# Patient Record
Sex: FEMALE | Race: WHITE | NOT HISPANIC OR LATINO
[De-identification: names, ages, dates, MRNs, and addresses within clinical notes are randomized per-mention and may not be internally consistent; named-entity substitution may affect disease eponyms.]

---

## 2018-03-22 ENCOUNTER — MESSAGE (OUTPATIENT)
Age: 58
End: 2018-03-22

## 2018-05-08 ENCOUNTER — APPOINTMENT (OUTPATIENT)
Dept: MAMMOGRAPHY | Facility: IMAGING CENTER | Age: 58
End: 2018-05-08
Payer: COMMERCIAL

## 2018-05-08 ENCOUNTER — OUTPATIENT (OUTPATIENT)
Dept: OUTPATIENT SERVICES | Facility: HOSPITAL | Age: 58
LOS: 1 days | End: 2018-05-08
Payer: COMMERCIAL

## 2018-05-08 DIAGNOSIS — Z00.8 ENCOUNTER FOR OTHER GENERAL EXAMINATION: ICD-10-CM

## 2018-05-08 PROCEDURE — 77067 SCR MAMMO BI INCL CAD: CPT | Mod: 26

## 2018-05-08 PROCEDURE — 77063 BREAST TOMOSYNTHESIS BI: CPT

## 2018-05-08 PROCEDURE — 77063 BREAST TOMOSYNTHESIS BI: CPT | Mod: 26

## 2018-05-08 PROCEDURE — 77067 SCR MAMMO BI INCL CAD: CPT

## 2019-09-30 ENCOUNTER — OUTPATIENT (OUTPATIENT)
Dept: OUTPATIENT SERVICES | Facility: HOSPITAL | Age: 59
LOS: 1 days | End: 2019-09-30
Payer: COMMERCIAL

## 2019-09-30 ENCOUNTER — APPOINTMENT (OUTPATIENT)
Dept: RADIOLOGY | Facility: IMAGING CENTER | Age: 59
End: 2019-09-30
Payer: COMMERCIAL

## 2019-09-30 ENCOUNTER — APPOINTMENT (OUTPATIENT)
Dept: ULTRASOUND IMAGING | Facility: IMAGING CENTER | Age: 59
End: 2019-09-30
Payer: COMMERCIAL

## 2019-09-30 ENCOUNTER — TRANSCRIPTION ENCOUNTER (OUTPATIENT)
Age: 59
End: 2019-09-30

## 2019-09-30 ENCOUNTER — APPOINTMENT (OUTPATIENT)
Dept: MAMMOGRAPHY | Facility: IMAGING CENTER | Age: 59
End: 2019-09-30
Payer: COMMERCIAL

## 2019-09-30 DIAGNOSIS — Z00.8 ENCOUNTER FOR OTHER GENERAL EXAMINATION: ICD-10-CM

## 2019-09-30 PROCEDURE — 77063 BREAST TOMOSYNTHESIS BI: CPT | Mod: 26

## 2019-09-30 PROCEDURE — 71046 X-RAY EXAM CHEST 2 VIEWS: CPT

## 2019-09-30 PROCEDURE — 76641 ULTRASOUND BREAST COMPLETE: CPT | Mod: 26,50

## 2019-09-30 PROCEDURE — 71046 X-RAY EXAM CHEST 2 VIEWS: CPT | Mod: 26

## 2019-09-30 PROCEDURE — 77067 SCR MAMMO BI INCL CAD: CPT | Mod: 26

## 2019-09-30 PROCEDURE — 77067 SCR MAMMO BI INCL CAD: CPT

## 2019-09-30 PROCEDURE — 77063 BREAST TOMOSYNTHESIS BI: CPT

## 2019-09-30 PROCEDURE — 76641 ULTRASOUND BREAST COMPLETE: CPT

## 2019-10-07 ENCOUNTER — APPOINTMENT (OUTPATIENT)
Dept: ULTRASOUND IMAGING | Facility: IMAGING CENTER | Age: 59
End: 2019-10-07

## 2019-10-07 ENCOUNTER — OUTPATIENT (OUTPATIENT)
Dept: OUTPATIENT SERVICES | Facility: HOSPITAL | Age: 59
LOS: 1 days | End: 2019-10-07
Payer: COMMERCIAL

## 2019-10-07 ENCOUNTER — APPOINTMENT (OUTPATIENT)
Dept: MAMMOGRAPHY | Facility: IMAGING CENTER | Age: 59
End: 2019-10-07

## 2019-10-07 DIAGNOSIS — Z00.8 ENCOUNTER FOR OTHER GENERAL EXAMINATION: ICD-10-CM

## 2019-10-07 PROCEDURE — G0279: CPT

## 2019-10-07 PROCEDURE — 76642 ULTRASOUND BREAST LIMITED: CPT

## 2019-10-07 PROCEDURE — 77065 DX MAMMO INCL CAD UNI: CPT

## 2019-10-10 ENCOUNTER — RESULT REVIEW (OUTPATIENT)
Age: 59
End: 2019-10-10

## 2019-10-10 ENCOUNTER — APPOINTMENT (OUTPATIENT)
Dept: MAMMOGRAPHY | Facility: IMAGING CENTER | Age: 59
End: 2019-10-10
Payer: COMMERCIAL

## 2019-10-10 ENCOUNTER — OUTPATIENT (OUTPATIENT)
Dept: OUTPATIENT SERVICES | Facility: HOSPITAL | Age: 59
LOS: 1 days | End: 2019-10-10
Payer: COMMERCIAL

## 2019-10-10 DIAGNOSIS — Z00.8 ENCOUNTER FOR OTHER GENERAL EXAMINATION: ICD-10-CM

## 2019-10-10 PROCEDURE — 77065 DX MAMMO INCL CAD UNI: CPT

## 2019-10-10 PROCEDURE — A4648: CPT

## 2019-10-10 PROCEDURE — 88305 TISSUE EXAM BY PATHOLOGIST: CPT | Mod: 26

## 2019-10-10 PROCEDURE — 77065 DX MAMMO INCL CAD UNI: CPT | Mod: 26,LT

## 2019-10-10 PROCEDURE — 19081 BX BREAST 1ST LESION STRTCTC: CPT | Mod: LT

## 2019-10-10 PROCEDURE — 19081 BX BREAST 1ST LESION STRTCTC: CPT

## 2019-10-10 PROCEDURE — 88305 TISSUE EXAM BY PATHOLOGIST: CPT

## 2019-10-26 ENCOUNTER — OUTPATIENT (OUTPATIENT)
Dept: OUTPATIENT SERVICES | Facility: HOSPITAL | Age: 59
LOS: 1 days | End: 2019-10-26
Payer: COMMERCIAL

## 2019-10-26 ENCOUNTER — APPOINTMENT (OUTPATIENT)
Dept: CT IMAGING | Facility: CLINIC | Age: 59
End: 2019-10-26
Payer: COMMERCIAL

## 2019-10-26 DIAGNOSIS — Z00.8 ENCOUNTER FOR OTHER GENERAL EXAMINATION: ICD-10-CM

## 2019-10-26 PROCEDURE — 74177 CT ABD & PELVIS W/CONTRAST: CPT | Mod: 26

## 2019-10-26 PROCEDURE — 74177 CT ABD & PELVIS W/CONTRAST: CPT

## 2019-11-01 ENCOUNTER — OUTPATIENT (OUTPATIENT)
Dept: OUTPATIENT SERVICES | Facility: HOSPITAL | Age: 59
LOS: 1 days | End: 2019-11-01
Payer: COMMERCIAL

## 2019-11-01 ENCOUNTER — APPOINTMENT (OUTPATIENT)
Dept: ULTRASOUND IMAGING | Facility: CLINIC | Age: 59
End: 2019-11-01
Payer: COMMERCIAL

## 2019-11-01 DIAGNOSIS — Z00.8 ENCOUNTER FOR OTHER GENERAL EXAMINATION: ICD-10-CM

## 2019-11-01 PROCEDURE — 76830 TRANSVAGINAL US NON-OB: CPT

## 2019-11-01 PROCEDURE — 76830 TRANSVAGINAL US NON-OB: CPT | Mod: 26

## 2019-11-01 PROCEDURE — 76856 US EXAM PELVIC COMPLETE: CPT

## 2019-11-01 PROCEDURE — 76856 US EXAM PELVIC COMPLETE: CPT | Mod: 26

## 2019-11-06 ENCOUNTER — APPOINTMENT (OUTPATIENT)
Dept: COLORECTAL SURGERY | Facility: CLINIC | Age: 59
End: 2019-11-06
Payer: COMMERCIAL

## 2019-11-06 VITALS
HEIGHT: 67 IN | SYSTOLIC BLOOD PRESSURE: 140 MMHG | HEART RATE: 58 BPM | BODY MASS INDEX: 22.76 KG/M2 | DIASTOLIC BLOOD PRESSURE: 80 MMHG | WEIGHT: 145 LBS | RESPIRATION RATE: 12 BRPM

## 2019-11-06 DIAGNOSIS — Z86.39 PERSONAL HISTORY OF OTHER ENDOCRINE, NUTRITIONAL AND METABOLIC DISEASE: ICD-10-CM

## 2019-11-06 DIAGNOSIS — Z81.8 FAMILY HISTORY OF OTHER MENTAL AND BEHAVIORAL DISORDERS: ICD-10-CM

## 2019-11-06 DIAGNOSIS — Z83.71 FAMILY HISTORY OF COLONIC POLYPS: ICD-10-CM

## 2019-11-06 DIAGNOSIS — Z72.89 OTHER PROBLEMS RELATED TO LIFESTYLE: ICD-10-CM

## 2019-11-06 DIAGNOSIS — Z83.79 FAMILY HISTORY OF OTHER DISEASES OF THE DIGESTIVE SYSTEM: ICD-10-CM

## 2019-11-06 DIAGNOSIS — Z83.49 FAMILY HISTORY OF OTHER ENDOCRINE, NUTRITIONAL AND METABOLIC DISEASES: ICD-10-CM

## 2019-11-06 PROCEDURE — 99244 OFF/OP CNSLTJ NEW/EST MOD 40: CPT

## 2019-11-08 ENCOUNTER — OUTPATIENT (OUTPATIENT)
Dept: OUTPATIENT SERVICES | Facility: HOSPITAL | Age: 59
LOS: 1 days | End: 2019-11-08
Payer: COMMERCIAL

## 2019-11-08 VITALS
DIASTOLIC BLOOD PRESSURE: 84 MMHG | HEART RATE: 65 BPM | OXYGEN SATURATION: 100 % | HEIGHT: 67 IN | TEMPERATURE: 98 F | WEIGHT: 138.01 LBS | RESPIRATION RATE: 16 BRPM | SYSTOLIC BLOOD PRESSURE: 132 MMHG

## 2019-11-08 DIAGNOSIS — Z98.890 OTHER SPECIFIED POSTPROCEDURAL STATES: Chronic | ICD-10-CM

## 2019-11-08 DIAGNOSIS — C18.9 MALIGNANT NEOPLASM OF COLON, UNSPECIFIED: ICD-10-CM

## 2019-11-08 DIAGNOSIS — Z01.818 ENCOUNTER FOR OTHER PREPROCEDURAL EXAMINATION: ICD-10-CM

## 2019-11-08 DIAGNOSIS — D49.0 NEOPLASM OF UNSPECIFIED BEHAVIOR OF DIGESTIVE SYSTEM: ICD-10-CM

## 2019-11-08 DIAGNOSIS — Z98.891 HISTORY OF UTERINE SCAR FROM PREVIOUS SURGERY: Chronic | ICD-10-CM

## 2019-11-08 PROCEDURE — 83036 HEMOGLOBIN GLYCOSYLATED A1C: CPT

## 2019-11-08 PROCEDURE — G0463: CPT

## 2019-11-08 PROCEDURE — 86901 BLOOD TYPING SEROLOGIC RH(D): CPT

## 2019-11-08 PROCEDURE — 86850 RBC ANTIBODY SCREEN: CPT

## 2019-11-08 PROCEDURE — 80048 BASIC METABOLIC PNL TOTAL CA: CPT

## 2019-11-08 PROCEDURE — 85027 COMPLETE CBC AUTOMATED: CPT

## 2019-11-08 PROCEDURE — 86900 BLOOD TYPING SEROLOGIC ABO: CPT

## 2019-11-08 RX ORDER — SODIUM CHLORIDE 9 MG/ML
3 INJECTION INTRAMUSCULAR; INTRAVENOUS; SUBCUTANEOUS EVERY 8 HOURS
Refills: 0 | Status: DISCONTINUED | OUTPATIENT
Start: 2019-11-11 | End: 2019-11-11

## 2019-11-08 RX ORDER — CEFOTETAN DISODIUM 1 G
2 VIAL (EA) INJECTION ONCE
Refills: 0 | Status: COMPLETED | OUTPATIENT
Start: 2019-11-11 | End: 2019-11-11

## 2019-11-08 RX ORDER — LIDOCAINE HCL 20 MG/ML
0.2 VIAL (ML) INJECTION ONCE
Refills: 0 | Status: DISCONTINUED | OUTPATIENT
Start: 2019-11-11 | End: 2019-11-11

## 2019-11-08 RX ORDER — CHLORHEXIDINE GLUCONATE 213 G/1000ML
1 SOLUTION TOPICAL ONCE
Refills: 0 | Status: COMPLETED | OUTPATIENT
Start: 2019-11-11 | End: 2019-11-11

## 2019-11-08 NOTE — H&P PST ADULT - NSICDXPASTMEDICALHX_GEN_ALL_CORE_FT
PAST MEDICAL HISTORY:  Anxiety newly dx with cancer    Colon cancer     Graves disease surgery 1981    Hypothyroidism

## 2019-11-08 NOTE — H&P PST ADULT - NSICDXPASTSURGICALHX_GEN_ALL_CORE_FT
PAST SURGICAL HISTORY:  H/O  section     S/P breast biopsy, left 10/2019 benign   marker in place    S/P partial thyroidectomy 1981

## 2019-11-08 NOTE — H&P PST ADULT - NSANTHOSAYNRD_GEN_A_CORE
No. YEHUDA screening performed.  STOP BANG Legend: 0-2 = LOW Risk; 3-4 = INTERMEDIATE Risk; 5-8 = HIGH Risk

## 2019-11-08 NOTE — H&P PST ADULT - LIVES WITH, PROFILE
2nd attempt to reach pt unsuccessful. Left message requesting that pt call back to schedule. Given direct number.  
children

## 2019-11-08 NOTE — H&P PST ADULT - ASSESSMENT
CAPRINI SCORE [CLOT]    AGE RELATED RISK FACTORS                                                       MOBILITY RELATED FACTORS  [x ] Age 41-60 years                                            (1 Point)                  [ ] Bed rest                                                        (1 Point)  [ ] Age: 61-74 years                                           (2 Points)                 [ ] Plaster cast                                                   (2 Points)  [ ] Age= 75 years                                              (3 Points)                 [ ] Bed bound for more than 72 hours                 (2 Points)    DISEASE RELATED RISK FACTORS                                               GENDER SPECIFIC FACTORS  [ ] Edema in the lower extremities                       (1 Point)                  [ ] Pregnancy                                                     (1 Point)  [ ] Varicose veins                                               (1 Point)                  [ ] Post-partum < 6 weeks                                   (1 Point)             [ ] BMI > 25 Kg/m2                                            (1 Point)                  [ ] Hormonal therapy  or oral contraception          (1 Point)                 [ ] Sepsis (in the previous month)                        (1 Point)                  [ ] History of pregnancy complications                 (1 point)  [ ] Pneumonia or serious lung disease                                               [ ] Unexplained or recurrent                     (1 Point)           (in the previous month)                               (1 Point)  [ ] Abnormal pulmonary function test                     (1 Point)                 SURGERY RELATED RISK FACTORS  [ ] Acute myocardial infarction                              (1 Point)                 [ ]  Section                                             (1 Point)  [ ] Congestive heart failure (in the previous month)  (1 Point)               [ ] Minor surgery                                                  (1 Point)   [ ] Inflammatory bowel disease                             (1 Point)                 [ ] Arthroscopic surgery                                        (2 Points)  [ ] Central venous access                                      (2 Points)                [x ] General surgery lasting more than 45 minutes   (2 Points)       [ ] Stroke (in the previous month)                          (5 Points)               [ ] Elective arthroplasty                                         (5 Points)                                                                                                                                               HEMATOLOGY RELATED FACTORS                                                 TRAUMA RELATED RISK FACTORS  [ ] Prior episodes of VTE                                     (3 Points)                 [ ] Fracture of the hip, pelvis, or leg                       (5 Points)  [ ] Positive family history for VTE                         (3 Points)                 [ ] Acute spinal cord injury (in the previous month)  (5 Points)  [ ] Prothrombin 43477 A                                     (3 Points)                 [ ] Paralysis  (less than 1 month)                             (5 Points)  [ ] Factor V Leiden                                             (3 Points)                  [ ] Multiple Trauma within 1 month                        (5 Points)  [ ] Lupus anticoagulants                                     (3 Points)                                                           [ ] Anticardiolipin antibodies                               (3 Points)                                                       [ ] High homocysteine in the blood                      (3 Points)                                             [ ] Other congenital or acquired thrombophilia      (3 Points)                                                [ ] Heparin induced thrombocytopenia                  (3 Points)                                          Total Score [       3   ]  The Caprini score indicates this patient is at risk for a VTE event (score 3-5).  Most surgical patients in this group would benefit from pharmacologic prophylaxis.  The surgical team will determine the balance between VTE risk and bleeding risk      colon cancer

## 2019-11-10 ENCOUNTER — TRANSCRIPTION ENCOUNTER (OUTPATIENT)
Age: 59
End: 2019-11-10

## 2019-11-11 ENCOUNTER — APPOINTMENT (OUTPATIENT)
Dept: COLORECTAL SURGERY | Facility: HOSPITAL | Age: 59
End: 2019-11-11
Payer: COMMERCIAL

## 2019-11-11 ENCOUNTER — RESULT REVIEW (OUTPATIENT)
Age: 59
End: 2019-11-11

## 2019-11-11 ENCOUNTER — INPATIENT (INPATIENT)
Facility: HOSPITAL | Age: 59
LOS: 2 days | Discharge: HOME | DRG: 331 | End: 2019-11-14
Attending: SURGERY | Admitting: SURGERY
Payer: COMMERCIAL

## 2019-11-11 VITALS
HEART RATE: 78 BPM | SYSTOLIC BLOOD PRESSURE: 125 MMHG | TEMPERATURE: 98 F | RESPIRATION RATE: 18 BRPM | DIASTOLIC BLOOD PRESSURE: 91 MMHG | HEIGHT: 67 IN | WEIGHT: 138.01 LBS | OXYGEN SATURATION: 99 %

## 2019-11-11 DIAGNOSIS — Z98.891 HISTORY OF UTERINE SCAR FROM PREVIOUS SURGERY: Chronic | ICD-10-CM

## 2019-11-11 DIAGNOSIS — D49.0 NEOPLASM OF UNSPECIFIED BEHAVIOR OF DIGESTIVE SYSTEM: ICD-10-CM

## 2019-11-11 DIAGNOSIS — Z98.890 OTHER SPECIFIED POSTPROCEDURAL STATES: Chronic | ICD-10-CM

## 2019-11-11 LAB
GLUCOSE BLDC GLUCOMTR-MCNC: 159 MG/DL — HIGH (ref 70–99)
RH IG SCN BLD-IMP: POSITIVE — SIGNIFICANT CHANGE UP

## 2019-11-11 PROCEDURE — 88341 IMHCHEM/IMCYTCHM EA ADD ANTB: CPT | Mod: 26

## 2019-11-11 PROCEDURE — 52005 CYSTO W/URTRL CATHJ: CPT

## 2019-11-11 PROCEDURE — 88309 TISSUE EXAM BY PATHOLOGIST: CPT | Mod: 26

## 2019-11-11 PROCEDURE — 88342 IMHCHEM/IMCYTCHM 1ST ANTB: CPT | Mod: 26

## 2019-11-11 PROCEDURE — 44204 LAPARO PARTIAL COLECTOMY: CPT

## 2019-11-11 RX ORDER — OXYCODONE HYDROCHLORIDE 5 MG/1
5 TABLET ORAL EVERY 4 HOURS
Refills: 0 | Status: DISCONTINUED | OUTPATIENT
Start: 2019-11-11 | End: 2019-11-14

## 2019-11-11 RX ORDER — LEVOTHYROXINE SODIUM 125 MCG
75 TABLET ORAL DAILY
Refills: 0 | Status: DISCONTINUED | OUTPATIENT
Start: 2019-11-11 | End: 2019-11-14

## 2019-11-11 RX ORDER — KETOROLAC TROMETHAMINE 30 MG/ML
15 SYRINGE (ML) INJECTION EVERY 6 HOURS
Refills: 0 | Status: DISCONTINUED | OUTPATIENT
Start: 2019-11-11 | End: 2019-11-12

## 2019-11-11 RX ORDER — ACETAMINOPHEN 500 MG
1000 TABLET ORAL EVERY 6 HOURS
Refills: 0 | Status: COMPLETED | OUTPATIENT
Start: 2019-11-11 | End: 2019-11-12

## 2019-11-11 RX ORDER — SODIUM CHLORIDE 9 MG/ML
1000 INJECTION, SOLUTION INTRAVENOUS
Refills: 0 | Status: DISCONTINUED | OUTPATIENT
Start: 2019-11-11 | End: 2019-11-13

## 2019-11-11 RX ORDER — ENOXAPARIN SODIUM 100 MG/ML
40 INJECTION SUBCUTANEOUS DAILY
Refills: 0 | Status: DISCONTINUED | OUTPATIENT
Start: 2019-11-12 | End: 2019-11-14

## 2019-11-11 RX ORDER — ONDANSETRON 8 MG/1
4 TABLET, FILM COATED ORAL ONCE
Refills: 0 | Status: DISCONTINUED | OUTPATIENT
Start: 2019-11-11 | End: 2019-11-12

## 2019-11-11 RX ORDER — INFLUENZA VIRUS VACCINE 15; 15; 15; 15 UG/.5ML; UG/.5ML; UG/.5ML; UG/.5ML
0.5 SUSPENSION INTRAMUSCULAR ONCE
Refills: 0 | Status: DISCONTINUED | OUTPATIENT
Start: 2019-11-11 | End: 2019-11-14

## 2019-11-11 RX ORDER — HYDROMORPHONE HYDROCHLORIDE 2 MG/ML
0.5 INJECTION INTRAMUSCULAR; INTRAVENOUS; SUBCUTANEOUS
Refills: 0 | Status: DISCONTINUED | OUTPATIENT
Start: 2019-11-11 | End: 2019-11-12

## 2019-11-11 RX ORDER — ONDANSETRON 8 MG/1
4 TABLET, FILM COATED ORAL EVERY 6 HOURS
Refills: 0 | Status: DISCONTINUED | OUTPATIENT
Start: 2019-11-11 | End: 2019-11-14

## 2019-11-11 RX ADMIN — HYDROMORPHONE HYDROCHLORIDE 0.5 MILLIGRAM(S): 2 INJECTION INTRAMUSCULAR; INTRAVENOUS; SUBCUTANEOUS at 19:07

## 2019-11-11 RX ADMIN — HYDROMORPHONE HYDROCHLORIDE 0.5 MILLIGRAM(S): 2 INJECTION INTRAMUSCULAR; INTRAVENOUS; SUBCUTANEOUS at 19:15

## 2019-11-11 RX ADMIN — Medication 15 MILLIGRAM(S): at 23:30

## 2019-11-11 RX ADMIN — SODIUM CHLORIDE 3 MILLILITER(S): 9 INJECTION INTRAMUSCULAR; INTRAVENOUS; SUBCUTANEOUS at 19:06

## 2019-11-11 NOTE — BRIEF OPERATIVE NOTE - NSICDXBRIEFPROCEDURE_GEN_ALL_CORE_FT
PROCEDURES:  Cystoscopy, with bilateral ureteral stent replacement 11-Nov-2019 18:31:08  Luz Maria Latham  Laparoscopic right colectomy 11-Nov-2019 18:29:09  Luz Maria Latham

## 2019-11-11 NOTE — CHART NOTE - NSCHARTNOTEFT_GEN_A_CORE
STATUS POST: Lap Right hemicolectomy     POST OPERATIVE DAY #: 0    SUBJECTIVE: Pt seen at bedside, complains of mild incisional abdominal pain. Denies N/V chest pain, SOB      Vital Signs Last 24 Hrs  T(C): 36.2 (11 Nov 2019 18:43), Max: 36.7 (11 Nov 2019 13:24)  T(F): 97.2 (11 Nov 2019 18:43), Max: 98.1 (11 Nov 2019 13:24)  HR: 62 (11 Nov 2019 22:00) (53 - 79)  BP: 94/51 (11 Nov 2019 21:30) (94/51 - 142/66)  BP(mean): 69 (11 Nov 2019 21:30) (69 - 95)  RR: 12 (11 Nov 2019 22:00) (12 - 18)  SpO2: 100% (11 Nov 2019 22:00) (99% - 100%)  I&O's Summary    11 Nov 2019 07:01  -  11 Nov 2019 22:41  --------------------------------------------------------  IN: 160 mL / OUT: 190 mL / NET: -30 mL      I&O's Detail    11 Nov 2019 07:01  -  11 Nov 2019 22:41  --------------------------------------------------------  IN:    lactated ringers.: 160 mL  Total IN: 160 mL    OUT:    Indwelling Catheter - Urethral: 190 mL  Total OUT: 190 mL    Total NET: -30 mL          MEDICATIONS  (STANDING):  acetaminophen  IVPB .. 1000 milliGRAM(s) IV Intermittent every 6 hours  influenza   Vaccine 0.5 milliLiter(s) IntraMuscular once  ketorolac   Injectable 15 milliGRAM(s) IV Push every 6 hours  lactated ringers. 1000 milliLiter(s) (40 mL/Hr) IV Continuous <Continuous>  levothyroxine 75 MICROGram(s) Oral daily    MEDICATIONS  (PRN):  HYDROmorphone  Injectable 0.5 milliGRAM(s) IV Push every 10 minutes PRN Moderate Pain (4 - 6)  ondansetron Injectable 4 milliGRAM(s) IV Push once PRN Nausea and/or Vomiting  ondansetron Injectable 4 milliGRAM(s) IV Push every 6 hours PRN Nausea  oxyCODONE    IR 5 milliGRAM(s) Oral every 4 hours PRN Moderate Pain (4 - 6)      LABS:                RADIOLOGY & ADDITIONAL STUDIES:    Physical Exam:  General: NAD, resting comfortably  HEENT: NC/AT, EOMI, normal hearing, no oral lesions, no LAD, neck supple  Pulmonary: normal resp effort, CTA-B  Cardiovascular: NSR, no murmurs  Abdominal: soft, non distended, mildly tender to palpation over incision sites, dressings c/d/i   Extremities: WWP, normal strength, no clubbing/cyanosis/edema  Neuro: A/O x 3, CNs II-XII grossly intact, normal sensation, no focal deficits  Pulses: palpable distal pulses    A/P: 58y Female s/p right hemicolectomy   - CLD  - Tylenol/Toradol  - ERP protocol  - OOB  - Monitor Bowel Function

## 2019-11-12 LAB
ANION GAP SERPL CALC-SCNC: 16 MMOL/L — SIGNIFICANT CHANGE UP (ref 5–17)
BASOPHILS # BLD AUTO: 0.02 K/UL — SIGNIFICANT CHANGE UP (ref 0–0.2)
BASOPHILS NFR BLD AUTO: 0.3 % — SIGNIFICANT CHANGE UP (ref 0–2)
BUN SERPL-MCNC: 10 MG/DL — SIGNIFICANT CHANGE UP (ref 7–23)
CALCIUM SERPL-MCNC: 8 MG/DL — LOW (ref 8.4–10.5)
CHLORIDE SERPL-SCNC: 94 MMOL/L — LOW (ref 96–108)
CO2 SERPL-SCNC: 23 MMOL/L — SIGNIFICANT CHANGE UP (ref 22–31)
CREAT SERPL-MCNC: 0.75 MG/DL — SIGNIFICANT CHANGE UP (ref 0.5–1.3)
EOSINOPHIL # BLD AUTO: 0.19 K/UL — SIGNIFICANT CHANGE UP (ref 0–0.5)
EOSINOPHIL NFR BLD AUTO: 2.4 % — SIGNIFICANT CHANGE UP (ref 0–6)
GLUCOSE SERPL-MCNC: 88 MG/DL — SIGNIFICANT CHANGE UP (ref 70–99)
HCT VFR BLD CALC: 34.5 % — SIGNIFICANT CHANGE UP (ref 34.5–45)
HCT VFR BLD CALC: 35.6 % — SIGNIFICANT CHANGE UP (ref 34.5–45)
HGB BLD-MCNC: 11.3 G/DL — LOW (ref 11.5–15.5)
HGB BLD-MCNC: 11.4 G/DL — LOW (ref 11.5–15.5)
IMM GRANULOCYTES NFR BLD AUTO: 0.3 % — SIGNIFICANT CHANGE UP (ref 0–1.5)
LYMPHOCYTES # BLD AUTO: 0.89 K/UL — LOW (ref 1–3.3)
LYMPHOCYTES # BLD AUTO: 11.4 % — LOW (ref 13–44)
MAGNESIUM SERPL-MCNC: 2 MG/DL — SIGNIFICANT CHANGE UP (ref 1.6–2.6)
MCHC RBC-ENTMCNC: 27.3 PG — SIGNIFICANT CHANGE UP (ref 27–34)
MCHC RBC-ENTMCNC: 27.8 PG — SIGNIFICANT CHANGE UP (ref 27–34)
MCHC RBC-ENTMCNC: 32 GM/DL — SIGNIFICANT CHANGE UP (ref 32–36)
MCHC RBC-ENTMCNC: 32.8 GM/DL — SIGNIFICANT CHANGE UP (ref 32–36)
MCV RBC AUTO: 85 FL — SIGNIFICANT CHANGE UP (ref 80–100)
MCV RBC AUTO: 85.4 FL — SIGNIFICANT CHANGE UP (ref 80–100)
MONOCYTES # BLD AUTO: 0.61 K/UL — SIGNIFICANT CHANGE UP (ref 0–0.9)
MONOCYTES NFR BLD AUTO: 7.8 % — SIGNIFICANT CHANGE UP (ref 2–14)
NEUTROPHILS # BLD AUTO: 6.08 K/UL — SIGNIFICANT CHANGE UP (ref 1.8–7.4)
NEUTROPHILS NFR BLD AUTO: 77.8 % — HIGH (ref 43–77)
NRBC # BLD: 0 /100 WBCS — SIGNIFICANT CHANGE UP (ref 0–0)
NRBC # BLD: 0 /100 WBCS — SIGNIFICANT CHANGE UP (ref 0–0)
PHOSPHATE SERPL-MCNC: 3.9 MG/DL — SIGNIFICANT CHANGE UP (ref 2.5–4.5)
PLATELET # BLD AUTO: 191 K/UL — SIGNIFICANT CHANGE UP (ref 150–400)
PLATELET # BLD AUTO: 204 K/UL — SIGNIFICANT CHANGE UP (ref 150–400)
POTASSIUM SERPL-MCNC: 3.9 MMOL/L — SIGNIFICANT CHANGE UP (ref 3.5–5.3)
POTASSIUM SERPL-SCNC: 3.9 MMOL/L — SIGNIFICANT CHANGE UP (ref 3.5–5.3)
RBC # BLD: 4.06 M/UL — SIGNIFICANT CHANGE UP (ref 3.8–5.2)
RBC # BLD: 4.17 M/UL — SIGNIFICANT CHANGE UP (ref 3.8–5.2)
RBC # FLD: 12.6 % — SIGNIFICANT CHANGE UP (ref 10.3–14.5)
RBC # FLD: 12.7 % — SIGNIFICANT CHANGE UP (ref 10.3–14.5)
SODIUM SERPL-SCNC: 133 MMOL/L — LOW (ref 135–145)
WBC # BLD: 6.07 K/UL — SIGNIFICANT CHANGE UP (ref 3.8–10.5)
WBC # BLD: 7.81 K/UL — SIGNIFICANT CHANGE UP (ref 3.8–10.5)
WBC # FLD AUTO: 6.07 K/UL — SIGNIFICANT CHANGE UP (ref 3.8–10.5)
WBC # FLD AUTO: 7.81 K/UL — SIGNIFICANT CHANGE UP (ref 3.8–10.5)

## 2019-11-12 RX ORDER — MAGNESIUM OXIDE 400 MG ORAL TABLET 241.3 MG
1000 TABLET ORAL
Refills: 0 | Status: DISCONTINUED | OUTPATIENT
Start: 2019-11-12 | End: 2019-11-14

## 2019-11-12 RX ORDER — ACETAMINOPHEN 500 MG
1000 TABLET ORAL EVERY 6 HOURS
Refills: 0 | Status: DISCONTINUED | OUTPATIENT
Start: 2019-11-12 | End: 2019-11-14

## 2019-11-12 RX ORDER — ENOXAPARIN SODIUM 100 MG/ML
40 INJECTION SUBCUTANEOUS
Qty: 30 | Refills: 0
Start: 2019-11-12 | End: 2019-12-11

## 2019-11-12 RX ORDER — ENOXAPARIN SODIUM 100 MG/ML
40 INJECTION SUBCUTANEOUS
Qty: 30 | Refills: 0
Start: 2019-11-12 | End: 2019-11-18

## 2019-11-12 RX ORDER — IBUPROFEN 200 MG
400 TABLET ORAL EVERY 6 HOURS
Refills: 0 | Status: DISCONTINUED | OUTPATIENT
Start: 2019-11-12 | End: 2019-11-14

## 2019-11-12 RX ADMIN — OXYCODONE HYDROCHLORIDE 5 MILLIGRAM(S): 5 TABLET ORAL at 19:08

## 2019-11-12 RX ADMIN — Medication 15 MILLIGRAM(S): at 07:46

## 2019-11-12 RX ADMIN — Medication 1000 MILLIGRAM(S): at 13:15

## 2019-11-12 RX ADMIN — ENOXAPARIN SODIUM 40 MILLIGRAM(S): 100 INJECTION SUBCUTANEOUS at 12:45

## 2019-11-12 RX ADMIN — SODIUM CHLORIDE 40 MILLILITER(S): 9 INJECTION, SOLUTION INTRAVENOUS at 00:21

## 2019-11-12 RX ADMIN — Medication 75 MICROGRAM(S): at 06:14

## 2019-11-12 RX ADMIN — MAGNESIUM OXIDE 400 MG ORAL TABLET 1000 MILLIGRAM(S): 241.3 TABLET ORAL at 19:11

## 2019-11-12 RX ADMIN — Medication 15 MILLIGRAM(S): at 12:45

## 2019-11-12 RX ADMIN — Medication 400 MILLIGRAM(S): at 06:13

## 2019-11-12 RX ADMIN — Medication 15 MILLIGRAM(S): at 06:14

## 2019-11-12 RX ADMIN — Medication 1000 MILLIGRAM(S): at 00:20

## 2019-11-12 RX ADMIN — Medication 15 MILLIGRAM(S): at 13:00

## 2019-11-12 RX ADMIN — Medication 1000 MILLIGRAM(S): at 23:28

## 2019-11-12 RX ADMIN — Medication 1000 MILLIGRAM(S): at 07:46

## 2019-11-12 RX ADMIN — Medication 400 MILLIGRAM(S): at 12:45

## 2019-11-12 RX ADMIN — Medication 400 MILLIGRAM(S): at 20:29

## 2019-11-12 RX ADMIN — Medication 400 MILLIGRAM(S): at 17:49

## 2019-11-12 RX ADMIN — Medication 15 MILLIGRAM(S): at 00:03

## 2019-11-12 RX ADMIN — Medication 400 MILLIGRAM(S): at 00:02

## 2019-11-12 RX ADMIN — OXYCODONE HYDROCHLORIDE 5 MILLIGRAM(S): 5 TABLET ORAL at 23:28

## 2019-11-12 RX ADMIN — Medication 400 MILLIGRAM(S): at 21:00

## 2019-11-12 NOTE — PHYSICAL EXAM
[Normal Breath Sounds] : Normal breath sounds [Normal Heart Sounds] : normal heart sounds [Normal Rate and Rhythm] : normal rate and rhythm [No Edema] : No edema [Alert] : alert [Oriented to Person] : oriented to person [Oriented to Place] : oriented to place [Oriented to Time] : oriented to time [Anxious] : anxious [de-identified] : RLQ fullness/tenderness.  +BS Pfann scar [de-identified] : +ROM [de-identified] : NC/AT [de-identified] : intact

## 2019-11-12 NOTE — PROGRESS NOTE ADULT - SUBJECTIVE AND OBJECTIVE BOX
COLORECTAL SURGERY DAILY PROGRESS NOTE:       SUBJECTIVE/ROS: Patient complaining of incisional pain- no flatus or bowel movement- Denies nausea, vomiting, chest pain, shortness of breath       MEDICATIONS  (STANDING):  acetaminophen   Tablet .. 1000 milliGRAM(s) Oral every 6 hours  acetaminophen  IVPB .. 1000 milliGRAM(s) IV Intermittent every 6 hours  enoxaparin Injectable 40 milliGRAM(s) SubCutaneous daily  ibuprofen  Tablet. 400 milliGRAM(s) Oral every 6 hours  influenza   Vaccine 0.5 milliLiter(s) IntraMuscular once  ketorolac   Injectable 15 milliGRAM(s) IV Push every 6 hours  lactated ringers. 1000 milliLiter(s) (40 mL/Hr) IV Continuous <Continuous>  levothyroxine 75 MICROGram(s) Oral daily  magnesium oxide 1000 milliGRAM(s) Oral two times a day with meals    MEDICATIONS  (PRN):  ondansetron Injectable 4 milliGRAM(s) IV Push every 6 hours PRN Nausea  oxyCODONE    IR 5 milliGRAM(s) Oral every 4 hours PRN Moderate Pain (4 - 6)      OBJECTIVE:    Vital Signs Last 24 Hrs  T(C): 36.5 (12 Nov 2019 06:00), Max: 36.7 (11 Nov 2019 13:24)  T(F): 97.7 (12 Nov 2019 06:00), Max: 98.1 (11 Nov 2019 13:24)  HR: 60 (12 Nov 2019 06:00) (53 - 79)  BP: 101/58 (12 Nov 2019 06:00) (86/50 - 142/66)  BP(mean): 75 (12 Nov 2019 06:00) (64 - 95)  RR: 16 (12 Nov 2019 06:00) (12 - 18)  SpO2: 100% (12 Nov 2019 06:00) (99% - 100%)        I&O's Detail    11 Nov 2019 07:01  -  12 Nov 2019 07:00  --------------------------------------------------------  IN:    lactated ringers.: 480 mL    Oral Fluid: 100 mL  Total IN: 580 mL    OUT:    Indwelling Catheter - Urethral: 525 mL  Total OUT: 525 mL    Total NET: 55 mL          Daily Height in cm: 170.18 (11 Nov 2019 16:32)    Daily     LABS:                        11.3   7.81  )-----------( 204      ( 12 Nov 2019 03:10 )             34.5     11-12    133<L>  |  94<L>  |  10  ----------------------------<  88  3.9   |  23  |  0.75    Ca    8.0<L>      12 Nov 2019 03:10  Phos  3.9     11-12  Mg     2.0     11-12          Physical Exam:  General: NAD, resting comfortably  HEENT: NC/AT, EOMI, normal hearing, no oral lesions, no LAD, neck supple  Pulmonary: normal resp effort, CTA-B  Cardiovascular: NSR, no murmurs  Abdominal: soft, non distended, mildly tender to palpation over incision sites, incision c/d/i with mychal in place  Extremities: WWP, normal strength, no clubbing/cyanosis/edema  Neuro: A/O x 3, CNs II-XII grossly intact, normal sensation, no focal deficits  Pulses: palpable distal pulses

## 2019-11-12 NOTE — PROGRESS NOTE ADULT - ASSESSMENT
A/P: 58y Female s/p right hemicolectomy for cecal mass ERAS 11/11     - CLD-->LRD  - D/C IVF when tolerating 600cc of clears  - Tylenol/Toradol for pain control  - will require ppx Lovenox for discharge- subcutaneous medication education   - OOB/Ambulate  - ucath removed - d/c [facility]  - Monitor Bowel Function    Arelis Schulz PA-C [id number]

## 2019-11-12 NOTE — REVIEW OF SYSTEMS
[As Noted in HPI] : as noted in HPI [Anxiety] : anxiety [Negative] : Endocrine [Shortness Of Breath] : no shortness of breath [Chest Pain] : no chest pain [Easy Bruising] : no tendency for easy bruising [Easy Bleeding] : no tendency for easy bleeding

## 2019-11-12 NOTE — ASSESSMENT
[FreeTextEntry1] : The patient is a very pleasant 58-year-old female presents for consultation regarding management of a biopsy-proven adenocarcinoma of the cecum.\par \par Patient has had long-standing right lower quadrant abdominal pain. She had a colonoscopy in  which was reported as negative. Due to persistence of this discomfort her internist wanted her to have a repeat evaluation by GI.  A CAT scan of the abdomen and pelvis was performed revealing a mass of the right colon. This was followed by a colonoscopy revealing a sizable, biopsy-proven adenocarcinoma of the cecum/ascending colon. CAT scan revealed no distant metastatic disease but possible involvement of the right adnexa. Some large lymph nodes were also noted.\par \par The patient's past medical history is significant for elevated cholesterol and a past history of hyperthyroidism (Graves' disease). Past surgical history is significant for partial thyroidectomy and a  section via a Pfannenstiel incision.\par \par I had a long discussion with the patient and her friend who accompanied her regarding treatment of right-sided colon cancer. The first step of intervention is that of a right colectomy. Whether the right tube and ovary needs to be removed will be determined by the operative findings. We discussed laparoscopic and open approaches. We talked about anticipated operative time, hospital stay and time to complete recuperation. Risks, alternatives and benefits including but not limited to an anastomotic leak, wound infection and deep venous thrombosis were discussed.\par \par All questions were answered and she wishes to proceed with surgical scheduling as soon as possible. As an aside, the possibility of adjuvant therapy was also discussed and she was told that this is stage dependent.  We did in fact discuss staging and the fact that the result would be back approximately 7-10 days after surgery.

## 2019-11-12 NOTE — HISTORY OF PRESENT ILLNESS
[FreeTextEntry1] : 57yo WF with longstanding hx of RLQ pain (2012 colonoscopy performed.  Reportedly nl study).  2017 routine GYN pelvic sono (results nl).  Pts RLQ pain has persisted xyrs.  Pt cannot sleep on right side. Occasional bloating.  Denies change in bowel habits.  \par \par Initial appt with GI @2 weeks ago.  GI palpated RLQ fullness.  CT w/contrast was performed revealing cecal mass w/enlarged LNs.  F/U colonoscopy revealed sessile SC polyp which was biopsied (hyperplastic) and a malignant appearing mass involving IC valve/cecum.  Path consistent with adenoCA.  Presents for surgical consultation.\par \par Oct 2019 Underwent left breast stereotactic bx.  Results wnl

## 2019-11-13 ENCOUNTER — TRANSCRIPTION ENCOUNTER (OUTPATIENT)
Age: 59
End: 2019-11-13

## 2019-11-13 DIAGNOSIS — C18.9 MALIGNANT NEOPLASM OF COLON, UNSPECIFIED: ICD-10-CM

## 2019-11-13 LAB
ANION GAP SERPL CALC-SCNC: 18 MMOL/L — HIGH (ref 5–17)
BUN SERPL-MCNC: 6 MG/DL — LOW (ref 7–23)
CALCIUM SERPL-MCNC: 8.4 MG/DL — SIGNIFICANT CHANGE UP (ref 8.4–10.5)
CHLORIDE SERPL-SCNC: 95 MMOL/L — LOW (ref 96–108)
CO2 SERPL-SCNC: 21 MMOL/L — LOW (ref 22–31)
CREAT SERPL-MCNC: 0.74 MG/DL — SIGNIFICANT CHANGE UP (ref 0.5–1.3)
GLUCOSE BLDC GLUCOMTR-MCNC: 167 MG/DL — HIGH (ref 70–99)
GLUCOSE BLDC GLUCOMTR-MCNC: 68 MG/DL — LOW (ref 70–99)
GLUCOSE SERPL-MCNC: 52 MG/DL — LOW (ref 70–99)
HCT VFR BLD CALC: 36.6 % — SIGNIFICANT CHANGE UP (ref 34.5–45)
HGB BLD-MCNC: 11.7 G/DL — SIGNIFICANT CHANGE UP (ref 11.5–15.5)
MAGNESIUM SERPL-MCNC: 2 MG/DL — SIGNIFICANT CHANGE UP (ref 1.6–2.6)
MCHC RBC-ENTMCNC: 28.1 PG — SIGNIFICANT CHANGE UP (ref 27–34)
MCHC RBC-ENTMCNC: 32 GM/DL — SIGNIFICANT CHANGE UP (ref 32–36)
MCV RBC AUTO: 88 FL — SIGNIFICANT CHANGE UP (ref 80–100)
PHOSPHATE SERPL-MCNC: 2.3 MG/DL — LOW (ref 2.5–4.5)
PLATELET # BLD AUTO: 201 K/UL — SIGNIFICANT CHANGE UP (ref 150–400)
POTASSIUM SERPL-MCNC: 3.5 MMOL/L — SIGNIFICANT CHANGE UP (ref 3.5–5.3)
POTASSIUM SERPL-SCNC: 3.5 MMOL/L — SIGNIFICANT CHANGE UP (ref 3.5–5.3)
RBC # BLD: 4.16 M/UL — SIGNIFICANT CHANGE UP (ref 3.8–5.2)
RBC # FLD: 12.7 % — SIGNIFICANT CHANGE UP (ref 10.3–14.5)
SODIUM SERPL-SCNC: 134 MMOL/L — LOW (ref 135–145)
WBC # BLD: 5.89 K/UL — SIGNIFICANT CHANGE UP (ref 3.8–10.5)
WBC # FLD AUTO: 5.89 K/UL — SIGNIFICANT CHANGE UP (ref 3.8–10.5)

## 2019-11-13 RX ORDER — DEXTROSE 50 % IN WATER 50 %
50 SYRINGE (ML) INTRAVENOUS ONCE
Refills: 0 | Status: COMPLETED | OUTPATIENT
Start: 2019-11-13 | End: 2019-11-13

## 2019-11-13 RX ORDER — APIXABAN 2.5 MG/1
1 TABLET, FILM COATED ORAL
Qty: 60 | Refills: 0
Start: 2019-11-13 | End: 2019-12-12

## 2019-11-13 RX ORDER — SODIUM,POTASSIUM PHOSPHATES 278-250MG
1 POWDER IN PACKET (EA) ORAL ONCE
Refills: 0 | Status: COMPLETED | OUTPATIENT
Start: 2019-11-13 | End: 2019-11-13

## 2019-11-13 RX ORDER — SODIUM,POTASSIUM PHOSPHATES 278-250MG
1 POWDER IN PACKET (EA) ORAL
Refills: 0 | Status: COMPLETED | OUTPATIENT
Start: 2019-11-13 | End: 2019-11-14

## 2019-11-13 RX ORDER — POTASSIUM CHLORIDE 20 MEQ
40 PACKET (EA) ORAL ONCE
Refills: 0 | Status: COMPLETED | OUTPATIENT
Start: 2019-11-13 | End: 2019-11-13

## 2019-11-13 RX ADMIN — Medication 1 PACKET(S): at 17:28

## 2019-11-13 RX ADMIN — Medication 400 MILLIGRAM(S): at 02:27

## 2019-11-13 RX ADMIN — MAGNESIUM OXIDE 400 MG ORAL TABLET 1000 MILLIGRAM(S): 241.3 TABLET ORAL at 17:29

## 2019-11-13 RX ADMIN — Medication 400 MILLIGRAM(S): at 12:14

## 2019-11-13 RX ADMIN — MAGNESIUM OXIDE 400 MG ORAL TABLET 1000 MILLIGRAM(S): 241.3 TABLET ORAL at 12:19

## 2019-11-13 RX ADMIN — Medication 1000 MILLIGRAM(S): at 05:39

## 2019-11-13 RX ADMIN — Medication 400 MILLIGRAM(S): at 03:00

## 2019-11-13 RX ADMIN — Medication 50 MILLILITER(S): at 12:17

## 2019-11-13 RX ADMIN — OXYCODONE HYDROCHLORIDE 5 MILLIGRAM(S): 5 TABLET ORAL at 23:26

## 2019-11-13 RX ADMIN — OXYCODONE HYDROCHLORIDE 5 MILLIGRAM(S): 5 TABLET ORAL at 06:14

## 2019-11-13 RX ADMIN — Medication 40 MILLIEQUIVALENT(S): at 14:26

## 2019-11-13 RX ADMIN — Medication 1000 MILLIGRAM(S): at 20:47

## 2019-11-13 RX ADMIN — Medication 1000 MILLIGRAM(S): at 00:00

## 2019-11-13 RX ADMIN — Medication 1000 MILLIGRAM(S): at 06:14

## 2019-11-13 RX ADMIN — Medication 1000 MILLIGRAM(S): at 14:12

## 2019-11-13 RX ADMIN — Medication 400 MILLIGRAM(S): at 17:28

## 2019-11-13 RX ADMIN — Medication 1000 MILLIGRAM(S): at 20:17

## 2019-11-13 RX ADMIN — OXYCODONE HYDROCHLORIDE 5 MILLIGRAM(S): 5 TABLET ORAL at 00:00

## 2019-11-13 RX ADMIN — OXYCODONE HYDROCHLORIDE 5 MILLIGRAM(S): 5 TABLET ORAL at 23:56

## 2019-11-13 RX ADMIN — ENOXAPARIN SODIUM 40 MILLIGRAM(S): 100 INJECTION SUBCUTANEOUS at 12:21

## 2019-11-13 RX ADMIN — OXYCODONE HYDROCHLORIDE 5 MILLIGRAM(S): 5 TABLET ORAL at 05:39

## 2019-11-13 RX ADMIN — Medication 75 MICROGRAM(S): at 05:38

## 2019-11-13 NOTE — PHYSICAL THERAPY INITIAL EVALUATION ADULT - PERTINENT HX OF CURRENT PROBLEM, REHAB EVAL
59 y/o F, s/p right hemicolectomy for cecal mass ERAS 11/11. PMH: anxiety newly dx with cancer, colon cancer, graves disease surgery 1981, hypothyroidism.

## 2019-11-13 NOTE — PROGRESS NOTE ADULT - ASSESSMENT
A/P: 58y Female s/p right hemicolectomy for cecal mass ERAS 11/11. She is doing well postoperatively and met her DTV after Gann removal. She is tolerating her diet and pain is well controlled. She is hemodynamically stable and afebrile.     - LRD  - Tylenol/Motrin for pain control  - will require ppx Lovenox for discharge- subcutaneous medication education   - OOB/Ambulate  - Monitor Bowel Function  - Dispo: d/c once having bowel function    [id number]

## 2019-11-13 NOTE — CHART NOTE - NSCHARTNOTEFT_GEN_A_CORE
Eliquis 2.5mg BID for 30 days sent to [facility] with no cost to patient with voucher- spoke with Alejandrina in [facility]    Arelis Schulz PA-C [id number]

## 2019-11-13 NOTE — DISCHARGE NOTE PROVIDER - NSDCFUADDINST_GEN_ALL_CORE_FT
Please utilize tylenol and motrin before using oxycodone for pain relief.  Do not exceed more then 4gm of tylenol in 24 hour period.

## 2019-11-13 NOTE — PROGRESS NOTE ADULT - SUBJECTIVE AND OBJECTIVE BOX
Red Team Surgery Progress Note     SUBJECTIVE / 24H EVENTS  Patient seen and examined on morning rounds. No acute events overnight. She had her Gann removed yesterday and met her DTV. She is tolerating her diet without issue and pain is well controlled. She denies fever, chills, n/v.    OBJECTIVE:    VITAL SIGNS:  T(C): 36.7 (11-13-19 @ 04:11), Max: 37.2 (11-12-19 @ 21:35)  HR: 72 (11-13-19 @ 04:11) (59 - 75)  BP: 119/73 (11-13-19 @ 04:11) (108/66 - 123/78)  RR: 18 (11-13-19 @ 04:11) (14 - 18)  SpO2: 98% (11-13-19 @ 04:11) (97% - 100%)  Daily     Daily       PHYSICAL EXAM:  Gen: NAD  LS: Respirations unlabored  Card: RRR  GI: Soft. Nontender. Nondistended. Incision c/d/i  : Gann in place  Ext: Warm, well perfused      11-12-19 @ 07:01  -  11-13-19 @ 07:00  --------------------------------------------------------  IN:    IV PiggyBack: 100 mL    lactated ringers.: 880 mL    Oral Fluid: 560 mL  Total IN: 1540 mL    OUT:    Indwelling Catheter - Urethral: 110 mL    Voided: 900 mL  Total OUT: 1010 mL    Total NET: 530 mL          LAB VALUES:  11-13    134<L>  |  95<L>  |  6<L>  ----------------------------<  52<L>  3.5   |  21<L>  |  0.74    Ca    8.4      13 Nov 2019 06:58  Phos  2.3     11-13  Mg     2.0     11-13                                 11.4   6.07  )-----------( 191      ( 12 Nov 2019 12:59 )             35.6                   MICROBIOLOGY:    No new microbiology data for review.     RADIOLOGY:    No new radiographic images for review.    MEDICATIONS  (STANDING):  acetaminophen   Tablet .. 1000 milliGRAM(s) Oral every 6 hours  enoxaparin Injectable 40 milliGRAM(s) SubCutaneous daily  ibuprofen  Tablet. 400 milliGRAM(s) Oral every 6 hours  influenza   Vaccine 0.5 milliLiter(s) IntraMuscular once  levothyroxine 75 MICROGram(s) Oral daily  magnesium oxide 1000 milliGRAM(s) Oral two times a day with meals    MEDICATIONS  (PRN):  ondansetron Injectable 4 milliGRAM(s) IV Push every 6 hours PRN Nausea  oxyCODONE    IR 5 milliGRAM(s) Oral every 4 hours PRN Moderate Pain (4 - 6)

## 2019-11-13 NOTE — DIETITIAN INITIAL EVALUATION ADULT. - ENERGY INTAKE
Pt reports tolerating clear liquid diet well today, pt drank full promote, ensure clear, diet advanced to low fiber this morning-menu provided and reviewed ordering procedure on 2 alexander

## 2019-11-13 NOTE — DIETITIAN INITIAL EVALUATION ADULT. - ENERGY NEEDS
Ht: 5'7", Wt: 137.7 lbs, BMI: 21.6 kg/m2, IBW: 135 lbs +/- 10%, %IBW: 102%  No edema, Skin free of pressure injury per nursing flowsheets

## 2019-11-13 NOTE — DIETITIAN INITIAL EVALUATION ADULT. - REASON INDICATOR FOR ASSESSMENT
Pt seen for nutrition consult for [facility] low fiber diet education. Information obtained from patient.     Pt is a 58 year old female with newly diagnosed colon cancer now S/P R hemicolectomy 11/11, following ERAS protocol.

## 2019-11-13 NOTE — PHYSICAL THERAPY INITIAL EVALUATION ADULT - ADDITIONAL COMMENTS
Prior to admission pt reports being independent of all ADL's & functional mobility without AD. Pt resides in house, 5 steps to enter, 1 flight to bedroom, (+) HR. Pt was a fulltime  PTA. (+) Driving.

## 2019-11-13 NOTE — DISCHARGE NOTE PROVIDER - NSDCCPTREATMENT_GEN_ALL_CORE_FT
PRINCIPAL PROCEDURE  Procedure: Laparoscopic right colectomy  Findings and Treatment: recover from surgery

## 2019-11-13 NOTE — DIETITIAN INITIAL EVALUATION ADULT. - OTHER INFO
Diet PTA: Pt reports decreased appetite for ~1 week leading up to surgery due to anxiety, stated she would still make an effort to eat but noticed her intake was slightly decreased from baseline. Pt typically consumes 3 meals per day plus snacks. Pt does not follow any formal dietary restrictions at home.     Weight: Pt reports UBW of 138 lbs which is consistent with current dosing weight 137.7 lbs (11/11).    Pt with no food allergies, no micronutrient supplementation PTA. No N+V, no BM yet but pt feels as though she needs to pass gas. No difficulty chewing/swallowing.    Diet education: RD provided low fiber diet education specifically choosing refined grain products, avoidance of fresh fruits, vegetables, nuts, beans and seeds, RD provided examples of foods to choose and foods to avoid-pt receptive to education and accepted written materials. RD also reviewed importance of adequate protein consumption following surgery to further aid on wound healing and recovery. Consuming smaller, more frequent meals while appetite recovers.

## 2019-11-13 NOTE — DISCHARGE NOTE PROVIDER - NSDCCPCAREPLAN_GEN_ALL_CORE_FT
PRINCIPAL DISCHARGE DIAGNOSIS  Diagnosis: Colon cancer  Assessment and Plan of Treatment: s/p right colectomy  WOUND CARE: Staples will be removed at follow up office visit.    BATHING: Please do not submerge wound underwater. You may shower and/or sponge bathe.  ACTIVITY: No heavy lifting anything more than 10-15lbs or straining. Otherwise, you may return to your usual level of physical activity. If you are taking narcotic pain medication (such as oxycodone or Percocet), do NOT drive a car, operate machinery or make important decisions.  NOTIFY YOUR SURGEON IF: You have any bleeding that does not stop, any pus draining from your wound, any fever (over 100.4 F) or chills, persistent nausea/vomiting with inability to tolerate food or liquids, persistent diarrhea, or if your pain is not controlled on your discharge pain medications.  FOLLOW-UP:  1. Please call to make a follow-up appointment in 1-2 weeks upon discharge from the hospital with Dr. Haynes  2. Please follow up with your primary care physician in one week regarding your hospitalization.

## 2019-11-13 NOTE — DISCHARGE NOTE PROVIDER - NSDCMRMEDTOKEN_GEN_ALL_CORE_FT
apixaban 2.5 mg oral tablet: 1 tab(s) orally 2 times a day   levothyroxine 75 mcg (0.075 mg) oral capsule: 1 cap(s) orally once a day acetaminophen 500 mg oral tablet: 2 tab(s) orally every 6 hours  apixaban 2.5 mg oral tablet: 1 tab(s) orally 2 times a day   ibuprofen 400 mg oral tablet: 1 tab(s) orally every 6 hours  levothyroxine 75 mcg (0.075 mg) oral capsule: 1 cap(s) orally once a day  oxyCODONE 5 mg oral tablet: 1 tab(s) orally every 4 hours, As needed, Moderate Pain (4 - 6) MDD:3

## 2019-11-13 NOTE — CONSULT NOTE ADULT - SUBJECTIVE AND OBJECTIVE BOX
Patient is a 58y Female     Patient is a 58y old  Female who presents with a chief complaint of Diverticulitis (2019 08:30)      HPI:  58 year old female with right side pain and discomfort had been referred for CT mass noted right colon had colonoscopy with biopsy + for colon cancer referred for surgery. (2019 18:02)      PAST MEDICAL & SURGICAL HISTORY:  Anxiety: newly dx with cancer  Graves disease: surgery   Hypothyroidism  Colon cancer  S/P breast biopsy, left: 10/2019 benign   marker in place  H/O  section:   S/P partial thyroidectomy:       MEDICATIONS  (STANDING):  acetaminophen   Tablet .. 1000 milliGRAM(s) Oral every 6 hours  enoxaparin Injectable 40 milliGRAM(s) SubCutaneous daily  ibuprofen  Tablet. 400 milliGRAM(s) Oral every 6 hours  influenza   Vaccine 0.5 milliLiter(s) IntraMuscular once  levothyroxine 75 MICROGram(s) Oral daily  magnesium oxide 1000 milliGRAM(s) Oral two times a day with meals  potassium chloride   Powder 40 milliEquivalent(s) Oral once  potassium phosphate / sodium phosphate powder 1 Packet(s) Oral once      Allergies    No Known Allergies    Intolerances        SOCIAL HISTORY:  Denies ETOh,Smoking,     FAMILY HISTORY:  FH: diverticulitis: maternal aunt      REVIEW OF SYSTEMS:    CONSTITUTIONAL: No weakness, fevers or chills  EYES/ENT: No visual changes;  No vertigo or throat pain   NECK: No pain or stiffness  RESPIRATORY: No cough, wheezing, hemoptysis; No shortness of breath  CARDIOVASCULAR: No chest pain or palpitations  GASTROINTESTINAL: No abdominal or epigastric pain. No nausea, vomiting, or hematemesis; No diarrhea or constipation. No melena or hematochezia.  GENITOURINARY: No dysuria, frequency or hematuria  NEUROLOGICAL: No numbness or weakness  SKIN: No itching, burning, rashes, or lesions   All other review of systems is negative unless indicated above.    VITAL:  T(C): , Max: 37.2 (19 @ 21:35)  T(F): , Max: 99 (19 @ 21:35)  HR: 72 (19 @ 04:11)  BP: 119/73 (19 @ 04:11)  BP(mean): 85 (19 @ 14:00)  RR: 18 (19 @ 04:11)  SpO2: 98% (19 @ 04:11)  Wt(kg): --    I and O's:     @ 07:01  -   @ 07:00  --------------------------------------------------------  IN: 620 mL / OUT: 565 mL / NET: 55 mL     @ 07:01  -   @ 07:00  --------------------------------------------------------  IN: 1540 mL / OUT: 1010 mL / NET: 530 mL          PHYSICAL EXAM:    Constitutional: NAD  HEENT: PERRLA,   Neck: No JVD  Respiratory: CTA B/L  Cardiovascular: S1 and S2  Gastrointestinal: hypoactive BS+, soft, dressing in place, expected tenderness  Extremities: No peripheral edema  Neurological: A/O x 3, no focal deficits  Psychiatric: Normal mood, normal affect  : No Gann  Skin: No rashes  Access: Not applicable  Back: No CVA tenderness    LABS:                        11.4   6.07  )-----------( 191      ( 2019 12:59 )             35.6     13    134<L>  |  95<L>  |  6<L>  ----------------------------<  52<L>  3.5   |  21<L>  |  0.74    Ca    8.4      2019 06:58  Phos  2.3       Mg     2.0                 RADIOLOGY & ADDITIONAL STUDIES:

## 2019-11-13 NOTE — DISCHARGE NOTE PROVIDER - HOSPITAL COURSE
58 y.o. female with cecal mass presented on 11/11/19 and underwent a laparoscopic right colectomy with ERAS protocol.  The patient tolerated the procedure well (see operative report for full details). She received intrathecal morphine for pain control. She was placed on IV tylenol and toradol for 24 hours.  Mrs. Rutherford was given clear liquids with ensure clears in PACU and encouraged with early ambulation.  Gann was removed on POD #1. She began voiding well on her own.  Once IV pain control dosing complete, she was transitioned to oral tylenol and motrin with oxycodone for breakthrough pain.  Once she tolerated 600cc of clear liquids, her diet was advanced to low fiber. 58 y.o. female with cecal mass presented on 11/11/19 and underwent a laparoscopic right colectomy with ERAS protocol.  The patient tolerated the procedure well (see operative report for full details). She received intrathecal morphine for pain control. She was placed on IV tylenol and toradol for 24 hours.  Mrs. Rutherford was given clear liquids with ensure clears in PACU and encouraged with early ambulation.  Gann was removed on POD #1. She began voiding well on her own.  Once IV pain control dosing complete, she was transitioned to oral tylenol and motrin with oxycodone for breakthrough pain.  Once she tolerated 600cc of clear liquids, her diet was advanced to low fiber.  Her caprini score was only 5 but given her cancer diagnosis, she was prescribed Eliquis DVT prophylaxis for 30 days.  On day of discharge, the patient was tolerating diet, ambulating well and pain controlled. She will follow up with Dr. Haynes in 1 week. 58 y.o. female with cecal mass presented on 11/11/19 and underwent a laparoscopic right colectomy, cystoscopy, ureteral catheters with ERAS protocol.  The patient tolerated the procedure well (see operative report for full details). She received intrathecal morphine for pain control. One ureteral catheter was removed immediately after OR. She was placed on IV tylenol and toradol for 24 hours.  Mrs. Rutherford was given clear liquids with ensure clears in PACU and encouraged with early ambulation.  Gann and remaining u-cath was removed on POD #1. She began voiding well on her own.  Once IV pain control dosing complete, she was transitioned to oral tylenol and motrin with oxycodone for breakthrough pain.  Once she tolerated 600cc of clear liquids, her diet was advanced to low fiber.  Her caprini score was only 5 but given her cancer diagnosis, she was prescribed Eliquis DVT prophylaxis for 30 days.  On day of discharge, the patient was tolerating diet, ambulating well and pain controlled. She will follow up with Dr. Haynes in 1 week.

## 2019-11-13 NOTE — DISCHARGE NOTE PROVIDER - CARE PROVIDER_API CALL
Yuval Haynes)  ColonRectal Surgery; Surgery  [address], [address]  [place] [address]  Phone: [phone]  Fax: [phone]  Follow Up Time:

## 2019-11-13 NOTE — DIETITIAN INITIAL EVALUATION ADULT. - ADD RECOMMEND
1. Review diet education as needed, 2. Continue to encourage po intake and obtain/honor food preferences as able, 3. Monitor PO intake, diet tolerance, weight trends, labs, and skin integrity

## 2019-11-14 ENCOUNTER — TRANSCRIPTION ENCOUNTER (OUTPATIENT)
Age: 59
End: 2019-11-14

## 2019-11-14 VITALS
OXYGEN SATURATION: 98 % | HEART RATE: 74 BPM | DIASTOLIC BLOOD PRESSURE: 77 MMHG | SYSTOLIC BLOOD PRESSURE: 116 MMHG | TEMPERATURE: 98 F | RESPIRATION RATE: 18 BRPM

## 2019-11-14 PROBLEM — C18.9 MALIGNANT NEOPLASM OF COLON, UNSPECIFIED: Chronic | Status: ACTIVE | Noted: 2019-11-08

## 2019-11-14 PROBLEM — E05.00 THYROTOXICOSIS WITH DIFFUSE GOITER WITHOUT THYROTOXIC CRISIS OR STORM: Chronic | Status: ACTIVE | Noted: 2019-11-08

## 2019-11-14 PROBLEM — F41.9 ANXIETY DISORDER, UNSPECIFIED: Chronic | Status: ACTIVE | Noted: 2019-11-08

## 2019-11-14 PROBLEM — E03.9 HYPOTHYROIDISM, UNSPECIFIED: Chronic | Status: ACTIVE | Noted: 2019-11-08

## 2019-11-14 LAB
ANION GAP SERPL CALC-SCNC: 15 MMOL/L — SIGNIFICANT CHANGE UP (ref 5–17)
BUN SERPL-MCNC: 7 MG/DL — SIGNIFICANT CHANGE UP (ref 7–23)
CALCIUM SERPL-MCNC: 8.7 MG/DL — SIGNIFICANT CHANGE UP (ref 8.4–10.5)
CHLORIDE SERPL-SCNC: 95 MMOL/L — LOW (ref 96–108)
CO2 SERPL-SCNC: 24 MMOL/L — SIGNIFICANT CHANGE UP (ref 22–31)
CREAT SERPL-MCNC: 0.75 MG/DL — SIGNIFICANT CHANGE UP (ref 0.5–1.3)
GLUCOSE SERPL-MCNC: 87 MG/DL — SIGNIFICANT CHANGE UP (ref 70–99)
HCT VFR BLD CALC: 39.5 % — SIGNIFICANT CHANGE UP (ref 34.5–45)
HGB BLD-MCNC: 12.7 G/DL — SIGNIFICANT CHANGE UP (ref 11.5–15.5)
MAGNESIUM SERPL-MCNC: 2.4 MG/DL — SIGNIFICANT CHANGE UP (ref 1.6–2.6)
MCHC RBC-ENTMCNC: 28 PG — SIGNIFICANT CHANGE UP (ref 27–34)
MCHC RBC-ENTMCNC: 32.2 GM/DL — SIGNIFICANT CHANGE UP (ref 32–36)
MCV RBC AUTO: 87 FL — SIGNIFICANT CHANGE UP (ref 80–100)
PHOSPHATE SERPL-MCNC: 2.6 MG/DL — SIGNIFICANT CHANGE UP (ref 2.5–4.5)
PLATELET # BLD AUTO: 246 K/UL — SIGNIFICANT CHANGE UP (ref 150–400)
POTASSIUM SERPL-MCNC: 4.2 MMOL/L — SIGNIFICANT CHANGE UP (ref 3.5–5.3)
POTASSIUM SERPL-SCNC: 4.2 MMOL/L — SIGNIFICANT CHANGE UP (ref 3.5–5.3)
RBC # BLD: 4.54 M/UL — SIGNIFICANT CHANGE UP (ref 3.8–5.2)
RBC # FLD: 12.7 % — SIGNIFICANT CHANGE UP (ref 10.3–14.5)
SODIUM SERPL-SCNC: 134 MMOL/L — LOW (ref 135–145)
WBC # BLD: 5.91 K/UL — SIGNIFICANT CHANGE UP (ref 3.8–10.5)
WBC # FLD AUTO: 5.91 K/UL — SIGNIFICANT CHANGE UP (ref 3.8–10.5)

## 2019-11-14 PROCEDURE — 86901 BLOOD TYPING SEROLOGIC RH(D): CPT

## 2019-11-14 PROCEDURE — 82962 GLUCOSE BLOOD TEST: CPT

## 2019-11-14 PROCEDURE — C1758: CPT

## 2019-11-14 PROCEDURE — 97161 PT EVAL LOW COMPLEX 20 MIN: CPT

## 2019-11-14 PROCEDURE — C1889: CPT

## 2019-11-14 PROCEDURE — 83735 ASSAY OF MAGNESIUM: CPT

## 2019-11-14 PROCEDURE — 80048 BASIC METABOLIC PNL TOTAL CA: CPT

## 2019-11-14 PROCEDURE — 88309 TISSUE EXAM BY PATHOLOGIST: CPT

## 2019-11-14 PROCEDURE — 86900 BLOOD TYPING SEROLOGIC ABO: CPT

## 2019-11-14 PROCEDURE — 88341 IMHCHEM/IMCYTCHM EA ADD ANTB: CPT

## 2019-11-14 PROCEDURE — 84100 ASSAY OF PHOSPHORUS: CPT

## 2019-11-14 PROCEDURE — 88342 IMHCHEM/IMCYTCHM 1ST ANTB: CPT

## 2019-11-14 PROCEDURE — 85027 COMPLETE CBC AUTOMATED: CPT

## 2019-11-14 RX ORDER — ACETAMINOPHEN 500 MG
2 TABLET ORAL
Qty: 0 | Refills: 0 | DISCHARGE
Start: 2019-11-14

## 2019-11-14 RX ORDER — IBUPROFEN 200 MG
1 TABLET ORAL
Qty: 0 | Refills: 0 | DISCHARGE
Start: 2019-11-14

## 2019-11-14 RX ORDER — OXYCODONE HYDROCHLORIDE 5 MG/1
1 TABLET ORAL
Qty: 15 | Refills: 0
Start: 2019-11-14

## 2019-11-14 RX ADMIN — Medication 400 MILLIGRAM(S): at 09:52

## 2019-11-14 RX ADMIN — Medication 1000 MILLIGRAM(S): at 02:06

## 2019-11-14 RX ADMIN — Medication 1000 MILLIGRAM(S): at 01:36

## 2019-11-14 RX ADMIN — Medication 1 PACKET(S): at 05:16

## 2019-11-14 RX ADMIN — MAGNESIUM OXIDE 400 MG ORAL TABLET 1000 MILLIGRAM(S): 241.3 TABLET ORAL at 09:49

## 2019-11-14 RX ADMIN — Medication 1000 MILLIGRAM(S): at 09:51

## 2019-11-14 RX ADMIN — Medication 400 MILLIGRAM(S): at 01:36

## 2019-11-14 RX ADMIN — Medication 400 MILLIGRAM(S): at 02:06

## 2019-11-14 RX ADMIN — Medication 75 MICROGRAM(S): at 05:16

## 2019-11-14 NOTE — PROGRESS NOTE ADULT - SUBJECTIVE AND OBJECTIVE BOX
COLORECTAL SURGERY DAILY PROGRESS NOTE:       SUBJECTIVE/ROS: Patient feels well- tolerating LRD- passing flatus and BMs overnight- pain controlled- Denies nausea, vomiting, chest pain, shortness of breath       MEDICATIONS  (STANDING):  acetaminophen   Tablet .. 1000 milliGRAM(s) Oral every 6 hours  enoxaparin Injectable 40 milliGRAM(s) SubCutaneous daily  ibuprofen  Tablet. 400 milliGRAM(s) Oral every 6 hours  influenza   Vaccine 0.5 milliLiter(s) IntraMuscular once  levothyroxine 75 MICROGram(s) Oral daily  magnesium oxide 1000 milliGRAM(s) Oral two times a day with meals    MEDICATIONS  (PRN):  ondansetron Injectable 4 milliGRAM(s) IV Push every 6 hours PRN Nausea  oxyCODONE    IR 5 milliGRAM(s) Oral every 4 hours PRN Moderate Pain (4 - 6)      OBJECTIVE:    Vital Signs Last 24 Hrs  T(C): 36.6 (2019 04:20), Max: 36.6 (2019 21:45)  T(F): 97.8 (2019 04:20), Max: 97.9 (2019 21:45)  HR: 85 (2019 04:20) (67 - 85)  BP: 104/73 (2019 04:20) (104/73 - 135/77)  BP(mean): --  RR: 18 (2019 04:20) (18 - 18)  SpO2: 97% (2019 04:20) (97% - 100%)        I&O's Detail    2019 07:01  -  2019 07:00  --------------------------------------------------------  IN:    IV PiggyBack: 100 mL    lactated ringers.: 880 mL    Oral Fluid: 560 mL  Total IN: 1540 mL    OUT:    Indwelling Catheter - Urethral: 110 mL    Voided: 900 mL  Total OUT: 1010 mL    Total NET: 530 mL      2019 07:01  -  2019 06:46  --------------------------------------------------------  IN:    Oral Fluid: 1000 mL  Total IN: 1000 mL    OUT:    Stool: 3 mL    Voided: 2250 mL  Total OUT: 2253 mL    Total NET: -1253 mL          Daily     Daily Weight in k.4 (2019 10:52)    LABS:                        11.7   5.89  )-----------( 201      ( 2019 10:16 )             36.6     11-    134<L>  |  95<L>  |  6<L>  ----------------------------<  52<L>  3.5   |  21<L>  |  0.74    Ca    8.4      2019 06:58  Phos  2.3       Mg     2.0                         PHYSICAL EXAM:  Constitutional: well developed, well nourished, NAD  Eyes: anicteric  ENMT: normal facies, symmetric  Neck: supple  Respiratory: CTA bilaterally  Cardiovascular: RRR  Gastrointestinal: abdomen soft, nontender, nondistended. incision c/d/i, mychal removed  Extremities: FROM, warm

## 2019-11-14 NOTE — DISCHARGE NOTE NURSING/CASE MANAGEMENT/SOCIAL WORK - PATIENT PORTAL LINK FT
You can access the FollowMyHealth Patient Portal offered by [facility] by registering at the following website: [email or web address]. By joining [facility]’s FollowMyHealth portal, you will also be able to view your health information using other applications (apps) compatible with our system.

## 2019-11-14 NOTE — PROGRESS NOTE ADULT - ASSESSMENT
57 yo female s/p right hemicolectomy for cecal mass 11/11    - LRD as tolerated  - Pain control  - D/C later today with Eliquis for DVT ppx    Arelis Schulz PA-C [id number] never used

## 2019-11-14 NOTE — PROGRESS NOTE ADULT - SUBJECTIVE AND OBJECTIVE BOX
CLARENCE PERERARY:[id number],   58yFemale followed for:  No Known Allergies    PAST MEDICAL & SURGICAL HISTORY:  Anxiety: newly dx with cancer  Graves disease: surgery   Hypothyroidism  Colon cancer  S/P breast biopsy, left: 10/2019 benign   marker in place  H/O  section:   S/P partial thyroidectomy:     FAMILY HISTORY:  FH: diverticulitis: maternal aunt    MEDICATIONS  (STANDING):  acetaminophen   Tablet .. 1000 milliGRAM(s) Oral every 6 hours  enoxaparin Injectable 40 milliGRAM(s) SubCutaneous daily  ibuprofen  Tablet. 400 milliGRAM(s) Oral every 6 hours  influenza   Vaccine 0.5 milliLiter(s) IntraMuscular once  levothyroxine 75 MICROGram(s) Oral daily  magnesium oxide 1000 milliGRAM(s) Oral two times a day with meals    MEDICATIONS  (PRN):  ondansetron Injectable 4 milliGRAM(s) IV Push every 6 hours PRN Nausea  oxyCODONE    IR 5 milliGRAM(s) Oral every 4 hours PRN Moderate Pain (4 - 6)      Vital Signs Last 24 Hrs  T(C): 36.6 (2019 04:20), Max: 36.6 (2019 21:45)  T(F): 97.8 (2019 04:20), Max: 97.9 (2019 21:45)  HR: 85 (2019 04:20) (67 - 85)  BP: 104/73 (2019 04:20) (104/73 - 135/77)  BP(mean): --  RR: 18 (2019 04:20) (18 - 18)  SpO2: 97% (2019 04:20) (97% - 100%)  nc/at  s1s2  cta  soft, nt, nd no guarding or rebound  no c/c/e    CBC Full  -  ( 2019 08:22 )  WBC Count : 5.91 K/uL  RBC Count : 4.54 M/uL  Hemoglobin : 12.7 g/dL  Hematocrit : 39.5 %  Platelet Count - Automated : 246 K/uL  Mean Cell Volume : 87.0 fl  Mean Cell Hemoglobin : 28.0 pg  Mean Cell Hemoglobin Concentration : 32.2 gm/dL  Auto Neutrophil # : x  Auto Lymphocyte # : x  Auto Monocyte # : x  Auto Eosinophil # : x  Auto Basophil # : x  Auto Neutrophil % : x  Auto Lymphocyte % : x  Auto Monocyte % : x  Auto Eosinophil % : x  Auto Basophil % : x    11-14    134<L>  |  95<L>  |  7   ----------------------------<  87  4.2   |  24  |  0.75    Ca    8.7      2019 06:46  Phos  2.6     11-14  Mg     2.4

## 2019-11-22 ENCOUNTER — APPOINTMENT (OUTPATIENT)
Dept: COLORECTAL SURGERY | Facility: CLINIC | Age: 59
End: 2019-11-22
Payer: COMMERCIAL

## 2019-11-22 VITALS
HEART RATE: 74 BPM | OXYGEN SATURATION: 93 % | SYSTOLIC BLOOD PRESSURE: 121 MMHG | TEMPERATURE: 97.7 F | DIASTOLIC BLOOD PRESSURE: 81 MMHG

## 2019-11-22 LAB — SURGICAL PATHOLOGY STUDY: SIGNIFICANT CHANGE UP

## 2019-11-22 PROCEDURE — 99024 POSTOP FOLLOW-UP VISIT: CPT

## 2019-11-25 ENCOUNTER — OUTPATIENT (OUTPATIENT)
Dept: OUTPATIENT SERVICES | Facility: HOSPITAL | Age: 59
LOS: 1 days | Discharge: HOME | End: 2019-11-25

## 2019-11-25 DIAGNOSIS — Z98.891 HISTORY OF UTERINE SCAR FROM PREVIOUS SURGERY: Chronic | ICD-10-CM

## 2019-11-25 DIAGNOSIS — Z98.890 OTHER SPECIFIED POSTPROCEDURAL STATES: Chronic | ICD-10-CM

## 2019-11-25 DIAGNOSIS — C18.9 MALIGNANT NEOPLASM OF COLON, UNSPECIFIED: ICD-10-CM

## 2019-11-26 NOTE — HISTORY OF PRESENT ILLNESS
[FreeTextEntry1] : Very pleasant 58-year-old white female who presents for her first postoperative visit.\par \par The patient is 11 days status post laparoscopic right colectomy for a large carcinoma of the cecum. The patient had a rapid and uneventful postoperative recuperation after being cared for with an ERAS protocol. She was discharged home on postoperative day #3. She looks and feels well and the long-standing right lower quadrant abdominal pain that she had is gone. She is tolerating diet and having formed bowel movements. She denies temperature elevation or rectal bleeding.\par \par Final pathology was consistent with a T3 N2 M0 lesion of the cecum. 19 of 29 lymph nodes were positive for metastatic cancer. 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 \par \par Physical examination reveals a well-developed, well-nourished white female. Her abdomen is totally soft, nontender and nondistended. The trocar sites and specimen extraction site are fully healed with no evidence of infection. Staples were removed.\par \par Patient and her good friend were informed of the pathology results. She certainly requires oncologic consultation for adjuvant therapy. Her prognosis is certainly guarded with this histologic type and multiple nodes positive.\par \par I will see her in 4 weeks for follow-up. I want her to remain on a low-residue diet and avoid strenuous activity for now.

## 2019-11-27 ENCOUNTER — APPOINTMENT (OUTPATIENT)
Dept: HEMATOLOGY ONCOLOGY | Facility: CLINIC | Age: 59
End: 2019-11-27
Payer: COMMERCIAL

## 2019-11-27 VITALS
DIASTOLIC BLOOD PRESSURE: 84 MMHG | BODY MASS INDEX: 19.94 KG/M2 | SYSTOLIC BLOOD PRESSURE: 131 MMHG | TEMPERATURE: 98.3 F | RESPIRATION RATE: 16 BRPM | WEIGHT: 130.07 LBS | OXYGEN SATURATION: 100 % | HEIGHT: 67.72 IN | HEART RATE: 77 BPM

## 2019-11-27 DIAGNOSIS — Z78.9 OTHER SPECIFIED HEALTH STATUS: ICD-10-CM

## 2019-11-27 DIAGNOSIS — Z87.891 PERSONAL HISTORY OF NICOTINE DEPENDENCE: ICD-10-CM

## 2019-11-27 PROCEDURE — 99205 OFFICE O/P NEW HI 60 MIN: CPT

## 2019-11-27 RX ORDER — NEOMYCIN SULFATE 500 MG/1
500 TABLET ORAL
Qty: 6 | Refills: 0 | Status: DISCONTINUED | COMMUNITY
Start: 2019-11-06 | End: 2019-11-27

## 2019-11-27 RX ORDER — METRONIDAZOLE 500 MG/1
500 TABLET ORAL
Qty: 3 | Refills: 0 | Status: DISCONTINUED | COMMUNITY
Start: 2019-11-06 | End: 2019-11-27

## 2019-11-27 NOTE — REVIEW OF SYSTEMS
[Anxiety] : anxiety [Negative] : Allergic/Immunologic [FreeTextEntry7] : mild tightness of the abdominal incision. [de-identified] : hypothyroidism

## 2019-11-27 NOTE — CONSULT LETTER
[Dear  ___] : Dear  [unfilled], [Consult Letter:] : I had the pleasure of evaluating your patient, [unfilled]. [Please see my note below.] : Please see my note below. [Consult Closing:] : Thank you very much for allowing me to participate in the care of this patient.  If you have any questions, please do not hesitate to contact me. [Sincerely,] : Sincerely, [DrVadim  ___] : Dr. ALVARADO [DrVadim ___] : Dr. ALVARADO

## 2019-11-27 NOTE — HISTORY OF PRESENT ILLNESS
[Disease: _____________________] : Disease: [unfilled] [T: ___] : T[unfilled] [N: ___] : N[unfilled] [M: ___] : M[unfilled] [AJCC Stage: ____] : AJCC Stage: [unfilled] [de-identified] : Ms. Rutherford is a 58 year old female with past medical history of Graves disease presenting to the office for an initial consultation of colon CA.\par \par Patient c/o right LQ pain with occasional bloating, denies change in bowel habits for several months. Had a colonoscopy in 2012 that is self-reported as normal. 2017 pelvic sono was negative. \par \par She was seen by GI in late 10/2019.  CAT abdomen with contrast ordered which revealed cecal mass with enlarged LNs.  F/U colonoscopy revealed sessile SC polyp which was biopsied (hyperplastic) and a malignant appearing mass involving IC valve/cecum.\par Pathology: adenocarcinoma\par \par She was referred to Dr. Yuval Haynes on 11/6/2019 who recommended right colectomy.\par \par Patient underwent right hemicolectomy on 11/11/2019 with Dr. 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

## 2019-11-29 ENCOUNTER — RESULT REVIEW (OUTPATIENT)
Age: 59
End: 2019-11-29

## 2019-11-29 ENCOUNTER — FORM ENCOUNTER (OUTPATIENT)
Age: 59
End: 2019-11-29

## 2019-11-29 ENCOUNTER — APPOINTMENT (OUTPATIENT)
Dept: HEMATOLOGY ONCOLOGY | Facility: CLINIC | Age: 59
End: 2019-11-29

## 2019-11-29 ENCOUNTER — LABORATORY RESULT (OUTPATIENT)
Age: 59
End: 2019-11-29

## 2019-11-29 PROBLEM — E78.00 PURE HYPERCHOLESTEROLEMIA, UNSPECIFIED: Chronic | Status: ACTIVE | Noted: 2019-11-27

## 2019-11-29 LAB
ALBUMIN SERPL ELPH-MCNC: 4.8 G/DL
ALP BLD-CCNC: 91 U/L
ALT SERPL-CCNC: 18 U/L
ANION GAP SERPL CALC-SCNC: 15 MMOL/L
APTT BLD: 40.5 SEC
AST SERPL-CCNC: 18 U/L
BASOPHILS # BLD AUTO: 0 K/UL — SIGNIFICANT CHANGE UP (ref 0–0.2)
BASOPHILS NFR BLD AUTO: 0.5 % — SIGNIFICANT CHANGE UP (ref 0–2)
BILIRUB SERPL-MCNC: 0.4 MG/DL
BUN SERPL-MCNC: 13 MG/DL
CALCIUM SERPL-MCNC: 9.7 MG/DL
CEA SERPL-MCNC: 1.3 NG/ML
CHLORIDE SERPL-SCNC: 101 MMOL/L
CO2 SERPL-SCNC: 25 MMOL/L
CREAT SERPL-MCNC: 0.83 MG/DL
CRP SERPL-MCNC: 0.33 MG/DL
EOSINOPHIL # BLD AUTO: 0.4 K/UL — SIGNIFICANT CHANGE UP (ref 0–0.5)
EOSINOPHIL NFR BLD AUTO: 7.2 % — HIGH (ref 0–6)
FERRITIN SERPL-MCNC: 33 NG/ML
GLUCOSE SERPL-MCNC: 89 MG/DL
HBV CORE IGG+IGM SER QL: NONREACTIVE
HBV SURFACE AB SER QL: NONREACTIVE
HBV SURFACE AG SER QL: NONREACTIVE
HCT VFR BLD CALC: 38.6 % — SIGNIFICANT CHANGE UP (ref 34.5–45)
HCV AB SER QL: NONREACTIVE
HCV S/CO RATIO: 0.11 S/CO
HGB BLD-MCNC: 12.4 G/DL — SIGNIFICANT CHANGE UP (ref 11.5–15.5)
HIV1+2 AB SPEC QL IA.RAPID: NONREACTIVE
IGA SER QL IEP: 189 MG/DL
IGG SER QL IEP: 841 MG/DL
IGM SER QL IEP: 115 MG/DL
INR PPP: 1.21 RATIO
LDH SERPL-CCNC: 175 U/L
LYMPHOCYTES # BLD AUTO: 1.4 K/UL — SIGNIFICANT CHANGE UP (ref 1–3.3)
LYMPHOCYTES # BLD AUTO: 27.1 % — SIGNIFICANT CHANGE UP (ref 13–44)
MCHC RBC-ENTMCNC: 28 PG — SIGNIFICANT CHANGE UP (ref 27–34)
MCHC RBC-ENTMCNC: 32.2 G/DL — SIGNIFICANT CHANGE UP (ref 32–36)
MCV RBC AUTO: 87 FL — SIGNIFICANT CHANGE UP (ref 80–100)
MONOCYTES # BLD AUTO: 0.4 K/UL — SIGNIFICANT CHANGE UP (ref 0–0.9)
MONOCYTES NFR BLD AUTO: 7.3 % — SIGNIFICANT CHANGE UP (ref 2–14)
NEUTROPHILS # BLD AUTO: 2.9 K/UL — SIGNIFICANT CHANGE UP (ref 1.8–7.4)
NEUTROPHILS NFR BLD AUTO: 57.8 % — SIGNIFICANT CHANGE UP (ref 43–77)
PLATELET # BLD AUTO: 312 K/UL — SIGNIFICANT CHANGE UP (ref 150–400)
POTASSIUM SERPL-SCNC: 4.4 MMOL/L
PROT SERPL-MCNC: 7.3 G/DL
PT BLD: 14 SEC
RBC # BLD: 4.44 M/UL — SIGNIFICANT CHANGE UP (ref 3.8–5.2)
RBC # FLD: 12 % — SIGNIFICANT CHANGE UP (ref 10.3–14.5)
SODIUM SERPL-SCNC: 141 MMOL/L
TSH SERPL-ACNC: 1.06 UIU/ML
WBC # BLD: 5 K/UL — SIGNIFICANT CHANGE UP (ref 3.8–10.5)
WBC # FLD AUTO: 5 K/UL — SIGNIFICANT CHANGE UP (ref 3.8–10.5)

## 2019-11-30 ENCOUNTER — OUTPATIENT (OUTPATIENT)
Dept: OUTPATIENT SERVICES | Facility: HOSPITAL | Age: 59
LOS: 1 days | End: 2019-11-30
Payer: COMMERCIAL

## 2019-11-30 ENCOUNTER — APPOINTMENT (OUTPATIENT)
Dept: CT IMAGING | Facility: IMAGING CENTER | Age: 59
End: 2019-11-30
Payer: COMMERCIAL

## 2019-11-30 DIAGNOSIS — R19.7 DIARRHEA, UNSPECIFIED: ICD-10-CM

## 2019-11-30 DIAGNOSIS — Z98.890 OTHER SPECIFIED POSTPROCEDURAL STATES: Chronic | ICD-10-CM

## 2019-11-30 DIAGNOSIS — D49.0 NEOPLASM OF UNSPECIFIED BEHAVIOR OF DIGESTIVE SYSTEM: ICD-10-CM

## 2019-11-30 DIAGNOSIS — Z98.891 HISTORY OF UTERINE SCAR FROM PREVIOUS SURGERY: Chronic | ICD-10-CM

## 2019-11-30 PROCEDURE — 71260 CT THORAX DX C+: CPT | Mod: 26

## 2019-11-30 PROCEDURE — 74177 CT ABD & PELVIS W/CONTRAST: CPT | Mod: 26

## 2019-11-30 PROCEDURE — 71260 CT THORAX DX C+: CPT

## 2019-11-30 PROCEDURE — 74177 CT ABD & PELVIS W/CONTRAST: CPT

## 2019-12-02 ENCOUNTER — FORM ENCOUNTER (OUTPATIENT)
Age: 59
End: 2019-12-02

## 2019-12-03 ENCOUNTER — OUTPATIENT (OUTPATIENT)
Dept: OUTPATIENT SERVICES | Facility: HOSPITAL | Age: 59
LOS: 1 days | End: 2019-12-03
Payer: COMMERCIAL

## 2019-12-03 DIAGNOSIS — Z98.890 OTHER SPECIFIED POSTPROCEDURAL STATES: Chronic | ICD-10-CM

## 2019-12-03 DIAGNOSIS — Z98.891 HISTORY OF UTERINE SCAR FROM PREVIOUS SURGERY: Chronic | ICD-10-CM

## 2019-12-03 DIAGNOSIS — D49.0 NEOPLASM OF UNSPECIFIED BEHAVIOR OF DIGESTIVE SYSTEM: ICD-10-CM

## 2019-12-03 PROCEDURE — 77001 FLUOROGUIDE FOR VEIN DEVICE: CPT

## 2019-12-03 PROCEDURE — 76937 US GUIDE VASCULAR ACCESS: CPT

## 2019-12-03 PROCEDURE — 77001 FLUOROGUIDE FOR VEIN DEVICE: CPT | Mod: 26

## 2019-12-03 PROCEDURE — 76937 US GUIDE VASCULAR ACCESS: CPT | Mod: 26

## 2019-12-03 PROCEDURE — 36561 INSERT TUNNELED CV CATH: CPT

## 2019-12-03 PROCEDURE — C1788: CPT

## 2019-12-03 PROCEDURE — C1769: CPT

## 2019-12-03 PROCEDURE — C1894: CPT

## 2019-12-10 DIAGNOSIS — C19 MALIGNANT NEOPLASM OF RECTOSIGMOID JUNCTION: ICD-10-CM

## 2019-12-10 DIAGNOSIS — Z45.2 ENCOUNTER FOR ADJUSTMENT AND MANAGEMENT OF VASCULAR ACCESS DEVICE: ICD-10-CM

## 2019-12-10 NOTE — CONSULT LETTER
[Dear  ___] : Dear  [unfilled], [Please see my note below.] : Please see my note below. [Consult Letter:] : I had the pleasure of evaluating your patient, [unfilled]. [Sincerely,] : Sincerely, [Consult Closing:] : Thank you very much for allowing me to participate in the care of this patient.  If you have any questions, please do not hesitate to contact me. [DrVadim ___] : Dr. ALVARADO [DrVadim  ___] : Dr. ALVARADO

## 2019-12-12 ENCOUNTER — RESULT REVIEW (OUTPATIENT)
Age: 59
End: 2019-12-12

## 2019-12-12 ENCOUNTER — APPOINTMENT (OUTPATIENT)
Dept: HEMATOLOGY ONCOLOGY | Facility: CLINIC | Age: 59
End: 2019-12-12
Payer: COMMERCIAL

## 2019-12-12 VITALS
OXYGEN SATURATION: 100 % | RESPIRATION RATE: 14 BRPM | HEART RATE: 70 BPM | TEMPERATURE: 97.4 F | SYSTOLIC BLOOD PRESSURE: 131 MMHG | DIASTOLIC BLOOD PRESSURE: 88 MMHG | WEIGHT: 130.93 LBS | BODY MASS INDEX: 20.08 KG/M2

## 2019-12-12 LAB
BASOPHILS # BLD AUTO: 0.1 K/UL — SIGNIFICANT CHANGE UP (ref 0–0.2)
BASOPHILS NFR BLD AUTO: 1.2 % — SIGNIFICANT CHANGE UP (ref 0–2)
EOSINOPHIL # BLD AUTO: 0.2 K/UL — SIGNIFICANT CHANGE UP (ref 0–0.5)
EOSINOPHIL NFR BLD AUTO: 4.3 % — SIGNIFICANT CHANGE UP (ref 0–6)
HCT VFR BLD CALC: 36.5 % — SIGNIFICANT CHANGE UP (ref 34.5–45)
HGB BLD-MCNC: 11.8 G/DL — SIGNIFICANT CHANGE UP (ref 11.5–15.5)
LYMPHOCYTES # BLD AUTO: 1.4 K/UL — SIGNIFICANT CHANGE UP (ref 1–3.3)
LYMPHOCYTES # BLD AUTO: 25.9 % — SIGNIFICANT CHANGE UP (ref 13–44)
MCHC RBC-ENTMCNC: 27.9 PG — SIGNIFICANT CHANGE UP (ref 27–34)
MCHC RBC-ENTMCNC: 32.3 G/DL — SIGNIFICANT CHANGE UP (ref 32–36)
MCV RBC AUTO: 86.5 FL — SIGNIFICANT CHANGE UP (ref 80–100)
MONOCYTES # BLD AUTO: 0.4 K/UL — SIGNIFICANT CHANGE UP (ref 0–0.9)
MONOCYTES NFR BLD AUTO: 8.6 % — SIGNIFICANT CHANGE UP (ref 2–14)
NEUTROPHILS # BLD AUTO: 3.2 K/UL — SIGNIFICANT CHANGE UP (ref 1.8–7.4)
NEUTROPHILS NFR BLD AUTO: 60.1 % — SIGNIFICANT CHANGE UP (ref 43–77)
PLATELET # BLD AUTO: 218 K/UL — SIGNIFICANT CHANGE UP (ref 150–400)
RBC # BLD: 4.22 M/UL — SIGNIFICANT CHANGE UP (ref 3.8–5.2)
RBC # FLD: 12.1 % — SIGNIFICANT CHANGE UP (ref 10.3–14.5)
WBC # BLD: 5.3 K/UL — SIGNIFICANT CHANGE UP (ref 3.8–10.5)
WBC # FLD AUTO: 5.3 K/UL — SIGNIFICANT CHANGE UP (ref 3.8–10.5)

## 2019-12-12 PROCEDURE — 99215 OFFICE O/P EST HI 40 MIN: CPT

## 2019-12-12 NOTE — REVIEW OF SYSTEMS
[Anxiety] : anxiety [Negative] : Allergic/Immunologic [FreeTextEntry7] : mild tightness of the abdominal incision. [de-identified] : hypothyroidism

## 2019-12-12 NOTE — HISTORY OF PRESENT ILLNESS
[Disease: _____________________] : Disease: [unfilled] [T: ___] : T[unfilled] [N: ___] : N[unfilled] [M: ___] : M[unfilled] [AJCC Stage: ____] : AJCC Stage: [unfilled] [Date: ____________] : Patient's last distress assessment performed on [unfilled]. [4 - Distress Level] : Distress Level: 4 [Patient given social work contact information and resource sheet] : Patient was given social work contact information and resource sheet [de-identified] : Ms. Rutherford is a 58 year old female with past medical history of Graves disease presenting to the office for an initial consultation of colon CA.\par \par Patient c/o right LQ pain with occasional bloating, denies change in bowel habits for several months. Had a colonoscopy in 2012 that is self-reported as normal. 2017 pelvic sono was negative. \par \par She was seen by GI in late 10/2019.  CAT abdomen with contrast ordered which revealed cecal mass with enlarged LNs.  F/U colonoscopy revealed sessile SC polyp which was biopsied (hyperplastic) and a malignant appearing mass involving IC valve/cecum.\par Pathology: adenocarcinoma\par \par She was referred to Dr. Yuval Haynes on 11/6/2019 who recommended right colectomy.\par \par Patient underwent right hemicolectomy on 11/11/2019 with Dr. 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  CAT chest, abdomen and pelvis performed on 12/30/2019 demonstrated no evidence of metastatic disease.  Mediport placed on 12/3/2019.  Today her main complaint is cough which started on 12/4/2019.  Cough is productive FOR PAST DAY. We reviewed FOLFOX and side effects and purpose of treatment. Consent process performed and signed by patient. [de-identified] : Surgeon: Yuval Haynes; [phone]\par PCP: Kimberly Melton; [phone]\par GI: Yuval Mendez; [phone]\par \par Patient Cell # [phone]\par \par  [de-identified] : adenocarcinoma

## 2019-12-16 ENCOUNTER — LABORATORY RESULT (OUTPATIENT)
Age: 59
End: 2019-12-16

## 2019-12-16 ENCOUNTER — RESULT REVIEW (OUTPATIENT)
Age: 59
End: 2019-12-16

## 2019-12-16 ENCOUNTER — APPOINTMENT (OUTPATIENT)
Dept: INFUSION THERAPY | Facility: HOSPITAL | Age: 59
End: 2019-12-16

## 2019-12-16 ENCOUNTER — OUTPATIENT (OUTPATIENT)
Dept: OUTPATIENT SERVICES | Facility: HOSPITAL | Age: 59
LOS: 1 days | Discharge: HOME | End: 2019-12-16

## 2019-12-16 DIAGNOSIS — C18.9 MALIGNANT NEOPLASM OF COLON, UNSPECIFIED: ICD-10-CM

## 2019-12-16 DIAGNOSIS — Z98.891 HISTORY OF UTERINE SCAR FROM PREVIOUS SURGERY: Chronic | ICD-10-CM

## 2019-12-16 DIAGNOSIS — Z98.890 OTHER SPECIFIED POSTPROCEDURAL STATES: Chronic | ICD-10-CM

## 2019-12-16 LAB
BASOPHILS # BLD AUTO: 0 K/UL — SIGNIFICANT CHANGE UP (ref 0–0.2)
BASOPHILS NFR BLD AUTO: 0.2 % — SIGNIFICANT CHANGE UP (ref 0–2)
EOSINOPHIL # BLD AUTO: 0.3 K/UL — SIGNIFICANT CHANGE UP (ref 0–0.5)
EOSINOPHIL NFR BLD AUTO: 3.7 % — SIGNIFICANT CHANGE UP (ref 0–6)
HCT VFR BLD CALC: 34.7 % — SIGNIFICANT CHANGE UP (ref 34.5–45)
HGB BLD-MCNC: 11.6 G/DL — SIGNIFICANT CHANGE UP (ref 11.5–15.5)
LYMPHOCYTES # BLD AUTO: 1.5 K/UL — SIGNIFICANT CHANGE UP (ref 1–3.3)
LYMPHOCYTES # BLD AUTO: 19.5 % — SIGNIFICANT CHANGE UP (ref 13–44)
M TB IFN-G BLD-IMP: POSITIVE
MCHC RBC-ENTMCNC: 28.4 PG — SIGNIFICANT CHANGE UP (ref 27–34)
MCHC RBC-ENTMCNC: 33.3 G/DL — SIGNIFICANT CHANGE UP (ref 32–36)
MCV RBC AUTO: 85.4 FL — SIGNIFICANT CHANGE UP (ref 80–100)
MONOCYTES # BLD AUTO: 0.4 K/UL — SIGNIFICANT CHANGE UP (ref 0–0.9)
MONOCYTES NFR BLD AUTO: 5.2 % — SIGNIFICANT CHANGE UP (ref 2–14)
NEUTROPHILS # BLD AUTO: 5.5 K/UL — SIGNIFICANT CHANGE UP (ref 1.8–7.4)
NEUTROPHILS NFR BLD AUTO: 71.4 % — SIGNIFICANT CHANGE UP (ref 43–77)
PLATELET # BLD AUTO: 216 K/UL — SIGNIFICANT CHANGE UP (ref 150–400)
QUANTIFERON TB PLUS MITOGEN MINUS NIL: 1.79 IU/ML
QUANTIFERON TB PLUS NIL: 0.01 IU/ML
QUANTIFERON TB PLUS TB1 MINUS NIL: 0.85 IU/ML
QUANTIFERON TB PLUS TB2 MINUS NIL: 0.72 IU/ML
RBC # BLD: 4.07 M/UL — SIGNIFICANT CHANGE UP (ref 3.8–5.2)
RBC # FLD: 12.1 % — SIGNIFICANT CHANGE UP (ref 10.3–14.5)
WBC # BLD: 7.7 K/UL — SIGNIFICANT CHANGE UP (ref 3.8–10.5)
WBC # FLD AUTO: 7.7 K/UL — SIGNIFICANT CHANGE UP (ref 3.8–10.5)

## 2019-12-17 ENCOUNTER — APPOINTMENT (OUTPATIENT)
Dept: INFECTIOUS DISEASE | Facility: CLINIC | Age: 59
End: 2019-12-17
Payer: COMMERCIAL

## 2019-12-17 VITALS
DIASTOLIC BLOOD PRESSURE: 69 MMHG | SYSTOLIC BLOOD PRESSURE: 109 MMHG | TEMPERATURE: 97.8 F | HEART RATE: 76 BPM | OXYGEN SATURATION: 99 % | HEIGHT: 68 IN

## 2019-12-17 DIAGNOSIS — Z51.11 ENCOUNTER FOR ANTINEOPLASTIC CHEMOTHERAPY: ICD-10-CM

## 2019-12-17 DIAGNOSIS — R11.2 NAUSEA WITH VOMITING, UNSPECIFIED: ICD-10-CM

## 2019-12-17 PROCEDURE — 99204 OFFICE O/P NEW MOD 45 MIN: CPT

## 2019-12-18 ENCOUNTER — APPOINTMENT (OUTPATIENT)
Dept: INFUSION THERAPY | Facility: HOSPITAL | Age: 59
End: 2019-12-18

## 2019-12-19 ENCOUNTER — APPOINTMENT (OUTPATIENT)
Dept: HEMATOLOGY ONCOLOGY | Facility: CLINIC | Age: 59
End: 2019-12-19
Payer: COMMERCIAL

## 2019-12-19 PROCEDURE — 90791 PSYCH DIAGNOSTIC EVALUATION: CPT

## 2019-12-20 ENCOUNTER — APPOINTMENT (OUTPATIENT)
Dept: COLORECTAL SURGERY | Facility: CLINIC | Age: 59
End: 2019-12-20
Payer: COMMERCIAL

## 2019-12-20 PROCEDURE — 99024 POSTOP FOLLOW-UP VISIT: CPT

## 2019-12-20 NOTE — CONSULT LETTER
[Consult Letter:] : I had the pleasure of evaluating your patient, [unfilled]. [Dear  ___] : Dear  [unfilled], [Please see my note below.] : Please see my note below. [Consult Closing:] : Thank you very much for allowing me to participate in the care of this patient.  If you have any questions, please do not hesitate to contact me. [Sincerely,] : Sincerely, [FreeTextEntry3] : \par Radha Costa MD\par  of Medicine\par Division of Infectious Diseases\par [facility]\par [address] [place] [address] DrVadim\[place] [address]\par Tel: [phone]\par Fax: [phone]\par  [FreeTextEntry2] : Dr. Shoaib Daly

## 2019-12-20 NOTE — HISTORY OF PRESENT ILLNESS
[FreeTextEntry1] : This is a 58 yo F with newly dx colon cancer, s/p surgery who presents today for a positive quantiferon gold.\par \par Pt underwent first chemo with FOLFOX yesterday 12/16/19.\par \par Reports that as a child, at 5 years old, may have had TB.\par Her father had TB and she was exposed to him.\par She had fevers of unknown etiology at that time and was hospitalized and quarantined for 1 year in [place] ([place]  She doesn't know any more details. Can not recall and has no one to ask what medications she took.\par \par She had a routine CT c/a/p for her disease and has a LLL granuloma, non-enlarged calcified hilar lymph nodes.\par \par She denies symptoms of active TB today such as cough, fever, weight loss, night sweats, lymphadenopathy. \par \par Friend is with her today who lives in [facility] and will try to find out more info from their Dept of Health if she is able.

## 2019-12-20 NOTE — PHYSICAL EXAM
[General Appearance - Alert] : alert [General Appearance - In No Acute Distress] : in no acute distress [PERRL With Normal Accommodation] : pupils were equal in size, round, reactive to light [General Appearance - Well-Appearing] : healthy appearing [Sclera] : the sclera and conjunctiva were normal [Outer Ear] : the ears and nose were normal in appearance [] : the neck was supple [Neck Appearance] : the appearance of the neck was normal [Oropharynx] : the oropharynx was normal with no thrush [Heart Rate And Rhythm] : heart rate was normal and rhythm regular [Bowel Sounds] : normal bowel sounds [Cervical Lymph Nodes Enlarged Posterior Bilaterally] : posterior cervical [Cervical Lymph Nodes Enlarged Anterior Bilaterally] : anterior cervical [Abdomen Soft] : soft [Supraclavicular Lymph Nodes Enlarged Bilaterally] : supraclavicular [Skin Lesions] : no skin lesions [Oriented To Time, Place, And Person] : oriented to person, place, and time [No Focal Deficits] : no focal deficits [Affect] : the affect was normal

## 2019-12-20 NOTE — ASSESSMENT
[FreeTextEntry1] : 59 F with colon cancer s/p surgery, now starting FOLFOX with positive quantiferon gold.  Likely remote history of prior Tuberculosis.\par \par Discussed at length with pt that she very well may have had treated TB and may have been treated appropriately. However in an abundance of caution I would recommend treatment for latent TB.  Her CT chest does not reveal findings of active TB.\par \par Her chemo regimen can cause neuropathy so I will avoid INH, and offered her Rifampin 600 mg po daily x 4 months. \par \par She is very hesitant to take a new medicine in combination with chemo (understandably so), and for now prefers to monitor her symptoms off of treatment for latent TB.\par \par We discussed the symptoms of TB and what to look for. She can change her mind and return for treatment any time.  She would need an active xray within 3 months prior to starting therapy for latent TB.\par \par She will notify me if she gets more info on her previously treated condition when she was a child.\par \par D/w Dr. Daly as well.

## 2019-12-22 ENCOUNTER — OTHER (OUTPATIENT)
Age: 59
End: 2019-12-22

## 2019-12-23 ENCOUNTER — RESULT REVIEW (OUTPATIENT)
Age: 59
End: 2019-12-23

## 2019-12-23 ENCOUNTER — APPOINTMENT (OUTPATIENT)
Dept: HEMATOLOGY ONCOLOGY | Facility: CLINIC | Age: 59
End: 2019-12-23

## 2019-12-23 DIAGNOSIS — Z51.11 ENCOUNTER FOR ANTINEOPLASTIC CHEMOTHERAPY: ICD-10-CM

## 2019-12-23 DIAGNOSIS — R11.2 NAUSEA WITH VOMITING, UNSPECIFIED: ICD-10-CM

## 2019-12-23 LAB
BASOPHILS # BLD AUTO: 0 K/UL — SIGNIFICANT CHANGE UP (ref 0–0.2)
BASOPHILS NFR BLD AUTO: 0.3 % — SIGNIFICANT CHANGE UP (ref 0–2)
EOSINOPHIL # BLD AUTO: 0.4 K/UL — SIGNIFICANT CHANGE UP (ref 0–0.5)
EOSINOPHIL NFR BLD AUTO: 7.8 % — HIGH (ref 0–6)
HCT VFR BLD CALC: 35.9 % — SIGNIFICANT CHANGE UP (ref 34.5–45)
HGB BLD-MCNC: 11.7 G/DL — SIGNIFICANT CHANGE UP (ref 11.5–15.5)
LYMPHOCYTES # BLD AUTO: 1.4 K/UL — SIGNIFICANT CHANGE UP (ref 1–3.3)
LYMPHOCYTES # BLD AUTO: 28.6 % — SIGNIFICANT CHANGE UP (ref 13–44)
MCHC RBC-ENTMCNC: 27.9 PG — SIGNIFICANT CHANGE UP (ref 27–34)
MCHC RBC-ENTMCNC: 32.6 G/DL — SIGNIFICANT CHANGE UP (ref 32–36)
MCV RBC AUTO: 85.8 FL — SIGNIFICANT CHANGE UP (ref 80–100)
MONOCYTES # BLD AUTO: 0.3 K/UL — SIGNIFICANT CHANGE UP (ref 0–0.9)
MONOCYTES NFR BLD AUTO: 5.6 % — SIGNIFICANT CHANGE UP (ref 2–14)
NEUTROPHILS # BLD AUTO: 2.8 K/UL — SIGNIFICANT CHANGE UP (ref 1.8–7.4)
NEUTROPHILS NFR BLD AUTO: 57.7 % — SIGNIFICANT CHANGE UP (ref 43–77)
PLATELET # BLD AUTO: 216 K/UL — SIGNIFICANT CHANGE UP (ref 150–400)
RBC # BLD: 4.18 M/UL — SIGNIFICANT CHANGE UP (ref 3.8–5.2)
RBC # FLD: 11.9 % — SIGNIFICANT CHANGE UP (ref 10.3–14.5)
WBC # BLD: 4.9 K/UL — SIGNIFICANT CHANGE UP (ref 3.8–10.5)
WBC # FLD AUTO: 4.9 K/UL — SIGNIFICANT CHANGE UP (ref 3.8–10.5)

## 2019-12-23 NOTE — CONSULT LETTER
[Dear  ___] : Dear  [unfilled], [Consult Letter:] : I had the pleasure of evaluating your patient, [unfilled]. [Please see my note below.] : Please see my note below. [Sincerely,] : Sincerely, [Consult Closing:] : Thank you very much for allowing me to participate in the care of this patient.  If you have any questions, please do not hesitate to contact me. [DrVadim ___] : Dr. ALVARADO [DrVadim  ___] : Dr. ALVARADO

## 2019-12-24 ENCOUNTER — APPOINTMENT (OUTPATIENT)
Dept: HEMATOLOGY ONCOLOGY | Facility: CLINIC | Age: 59
End: 2019-12-24
Payer: COMMERCIAL

## 2019-12-24 VITALS
TEMPERATURE: 97.8 F | SYSTOLIC BLOOD PRESSURE: 122 MMHG | WEIGHT: 127.87 LBS | DIASTOLIC BLOOD PRESSURE: 80 MMHG | BODY MASS INDEX: 19.44 KG/M2 | OXYGEN SATURATION: 100 % | RESPIRATION RATE: 16 BRPM | HEART RATE: 78 BPM

## 2019-12-24 PROCEDURE — 99214 OFFICE O/P EST MOD 30 MIN: CPT

## 2019-12-24 NOTE — HISTORY OF PRESENT ILLNESS
[Disease: _____________________] : Disease: [unfilled] [T: ___] : T[unfilled] [N: ___] : N[unfilled] [M: ___] : M[unfilled] [AJCC Stage: ____] : AJCC Stage: [unfilled] [Date: ____________] : Patient's last distress assessment performed on [unfilled]. [4 - Distress Level] : Distress Level: 4 [Patient given social work contact information and resource sheet] : Patient was given social work contact information and resource sheet [de-identified] : Ms. Rutherford is a 58 year old female with past medical history of Graves disease presenting to the office for an initial consultation of colon CA.\par \par Patient c/o right LQ pain with occasional bloating, denies change in bowel habits for several months. Had a colonoscopy in 2012 that is self-reported as normal. 2017 pelvic sono was negative. \par \par She was seen by GI in late 10/2019.  CAT abdomen with contrast ordered which revealed cecal mass with enlarged LNs.  F/U colonoscopy revealed sessile SC polyp which was biopsied (hyperplastic) and a malignant appearing mass involving IC valve/cecum.\par Pathology: adenocarcinoma\par \par She was referred to Dr. Yuval Haynes on 11/6/2019 who recommended right colectomy.\par \par Patient underwent right hemicolectomy on 11/11/2019 with Dr. 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  CAT chest, abdomen and pelvis performed on 12/30/2019 demonstrated no evidence of metastatic disease.  Mediport placed on 12/3/2019.  Today her main complaint is cough which started on 12/4/2019.  Cough is productive FOR PAST DAY. We reviewed FOLFOX and side effects and purpose of treatment. Consent process performed and signed by patient.\par \par 12/24/19: \par Colon CA: Patient here for toxicity evaluation s/p FOLFOX C1 on 12/16/2019.  She tolerated infusion well and developed no anaphylaxis reactions.  Denies fever, chills, mouth sores, neuropathy, diarrhea or weight loss.  CBC with diff performed on 12/23/2019 was unremarkable.\par Broken Tooth: Patient noticed over the weekend her R molar broke off.  She was seen by her dentist who performed a filling however states patient will require a root canal.  \par Left gingiva swelling: Noted after her first treatment.  She was seen by her dentist who rx a local anesthetic with excellent relief.\par Fatigue: Mild fatigue 2-3 days after her first treatment however abates during her off week.  She is able to perform her routine/instrumental ADLs without restrictions.\par \par  [de-identified] : adenocarcinoma [de-identified] : Surgeon: Yuval Haynes; [phone]\par PCP: Kimberly Melton; [phone]\par GI: Yuval Mendez; [phone]\par \par Patient Cell # [phone]\par \par

## 2019-12-24 NOTE — REVIEW OF SYSTEMS
[Anxiety] : anxiety [Negative] : Allergic/Immunologic [FreeTextEntry7] : mild tightness of the abdominal incision. [de-identified] : hypothyroidism

## 2019-12-26 ENCOUNTER — INBOUND DOCUMENT (OUTPATIENT)
Age: 59
End: 2019-12-26

## 2019-12-26 ENCOUNTER — OUTPATIENT (OUTPATIENT)
Dept: OUTPATIENT SERVICES | Facility: HOSPITAL | Age: 59
LOS: 1 days | Discharge: HOME | End: 2019-12-26

## 2019-12-26 DIAGNOSIS — Z98.891 HISTORY OF UTERINE SCAR FROM PREVIOUS SURGERY: Chronic | ICD-10-CM

## 2019-12-26 DIAGNOSIS — Z98.890 OTHER SPECIFIED POSTPROCEDURAL STATES: Chronic | ICD-10-CM

## 2019-12-26 DIAGNOSIS — C18.9 MALIGNANT NEOPLASM OF COLON, UNSPECIFIED: ICD-10-CM

## 2019-12-30 ENCOUNTER — APPOINTMENT (OUTPATIENT)
Dept: INFUSION THERAPY | Facility: HOSPITAL | Age: 59
End: 2019-12-30

## 2019-12-30 ENCOUNTER — RESULT REVIEW (OUTPATIENT)
Age: 59
End: 2019-12-30

## 2019-12-30 ENCOUNTER — APPOINTMENT (OUTPATIENT)
Dept: HEMATOLOGY ONCOLOGY | Facility: CLINIC | Age: 59
End: 2019-12-30
Payer: COMMERCIAL

## 2019-12-30 LAB
BASOPHILS # BLD AUTO: 0 K/UL — SIGNIFICANT CHANGE UP (ref 0–0.2)
EOSINOPHIL # BLD AUTO: 0.1 K/UL — SIGNIFICANT CHANGE UP (ref 0–0.5)
EOSINOPHIL NFR BLD AUTO: 1 % — SIGNIFICANT CHANGE UP (ref 0–6)
HCT VFR BLD CALC: 32.8 % — LOW (ref 34.5–45)
HGB BLD-MCNC: 10.9 G/DL — LOW (ref 11.5–15.5)
LYMPHOCYTES # BLD AUTO: 1 K/UL — SIGNIFICANT CHANGE UP (ref 1–3.3)
LYMPHOCYTES # BLD AUTO: 36 % — SIGNIFICANT CHANGE UP (ref 13–44)
MCHC RBC-ENTMCNC: 28.1 PG — SIGNIFICANT CHANGE UP (ref 27–34)
MCHC RBC-ENTMCNC: 33.4 G/DL — SIGNIFICANT CHANGE UP (ref 32–36)
MCV RBC AUTO: 84.2 FL — SIGNIFICANT CHANGE UP (ref 80–100)
MONOCYTES # BLD AUTO: 0.4 K/UL — SIGNIFICANT CHANGE UP (ref 0–0.9)
MONOCYTES NFR BLD AUTO: 10 % — SIGNIFICANT CHANGE UP (ref 2–14)
NEUTROPHILS # BLD AUTO: 1.3 K/UL — LOW (ref 1.8–7.4)
NEUTROPHILS NFR BLD AUTO: 53 % — SIGNIFICANT CHANGE UP (ref 43–77)
PLAT MORPH BLD: NORMAL — SIGNIFICANT CHANGE UP
PLATELET # BLD AUTO: 175 K/UL — SIGNIFICANT CHANGE UP (ref 150–400)
RBC # BLD: 3.89 M/UL — SIGNIFICANT CHANGE UP (ref 3.8–5.2)
RBC # FLD: 12 % — SIGNIFICANT CHANGE UP (ref 10.3–14.5)
RBC BLD AUTO: SIGNIFICANT CHANGE UP
WBC # BLD: 2.8 K/UL — LOW (ref 3.8–10.5)
WBC # FLD AUTO: 2.8 K/UL — LOW (ref 3.8–10.5)

## 2019-12-30 PROCEDURE — 90832 PSYTX W PT 30 MINUTES: CPT

## 2019-12-31 DIAGNOSIS — Z51.11 ENCOUNTER FOR ANTINEOPLASTIC CHEMOTHERAPY: ICD-10-CM

## 2019-12-31 DIAGNOSIS — R11.2 NAUSEA WITH VOMITING, UNSPECIFIED: ICD-10-CM

## 2020-01-02 ENCOUNTER — OUTPATIENT (OUTPATIENT)
Dept: OUTPATIENT SERVICES | Facility: HOSPITAL | Age: 60
LOS: 1 days | Discharge: HOME | End: 2020-01-02

## 2020-01-02 ENCOUNTER — APPOINTMENT (OUTPATIENT)
Dept: INFUSION THERAPY | Facility: HOSPITAL | Age: 60
End: 2020-01-02

## 2020-01-02 DIAGNOSIS — Z98.890 OTHER SPECIFIED POSTPROCEDURAL STATES: Chronic | ICD-10-CM

## 2020-01-02 DIAGNOSIS — Z98.891 HISTORY OF UTERINE SCAR FROM PREVIOUS SURGERY: Chronic | ICD-10-CM

## 2020-01-02 DIAGNOSIS — C18.9 MALIGNANT NEOPLASM OF COLON, UNSPECIFIED: ICD-10-CM

## 2020-01-02 NOTE — HISTORY OF PRESENT ILLNESS
[FreeTextEntry1] : Pleasant 59-year-old female who presents for her second postoperative visit. 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 She had multiple nodes positive.\par \par She looks and feels well having no further abdominal incisional pain. She is tolerating diet and moving her bowels. She is currently receiving prophylactic chemotherapy.\par \par Physical examination reveals a soft, nontender, nondistended abdomen. Her trocar sites and specimen extraction site are fully healed.\par \par The patient has no further dietary or physical activity restrictions. She will complete her chemotherapy under the guidance of oncology.\par \par I will see her on an as-needed basis.

## 2020-01-03 DIAGNOSIS — Z51.89 ENCOUNTER FOR OTHER SPECIFIED AFTERCARE: ICD-10-CM

## 2020-01-04 ENCOUNTER — RESULT REVIEW (OUTPATIENT)
Age: 60
End: 2020-01-04

## 2020-01-04 LAB
ALBUMIN SERPL ELPH-MCNC: 4.5 G/DL
ALP BLD-CCNC: 90 U/L
ALT SERPL-CCNC: 10 U/L
ANION GAP SERPL CALC-SCNC: 12 MMOL/L
APTT BLD: 32.6 SEC
AST SERPL-CCNC: 18 U/L
BILIRUB SERPL-MCNC: 0.5 MG/DL
BUN SERPL-MCNC: 13 MG/DL
CALCIUM SERPL-MCNC: 9.4 MG/DL
CHLORIDE SERPL-SCNC: 102 MMOL/L
CO2 SERPL-SCNC: 26 MMOL/L
CREAT SERPL-MCNC: 0.78 MG/DL
GLUCOSE SERPL-MCNC: 90 MG/DL
INR PPP: 1.03 RATIO
POTASSIUM SERPL-SCNC: 4.5 MMOL/L
PROT SERPL-MCNC: 7 G/DL
PT BLD: 11.6 SEC
SODIUM SERPL-SCNC: 140 MMOL/L

## 2020-01-12 ENCOUNTER — OUTPATIENT (OUTPATIENT)
Dept: OUTPATIENT SERVICES | Facility: HOSPITAL | Age: 60
LOS: 1 days | Discharge: HOME | End: 2020-01-12

## 2020-01-12 DIAGNOSIS — Z98.890 OTHER SPECIFIED POSTPROCEDURAL STATES: Chronic | ICD-10-CM

## 2020-01-12 DIAGNOSIS — Z98.891 HISTORY OF UTERINE SCAR FROM PREVIOUS SURGERY: Chronic | ICD-10-CM

## 2020-01-13 ENCOUNTER — RESULT REVIEW (OUTPATIENT)
Age: 60
End: 2020-01-13

## 2020-01-13 ENCOUNTER — APPOINTMENT (OUTPATIENT)
Dept: HEMATOLOGY ONCOLOGY | Facility: CLINIC | Age: 60
End: 2020-01-13
Payer: COMMERCIAL

## 2020-01-13 ENCOUNTER — LABORATORY RESULT (OUTPATIENT)
Age: 60
End: 2020-01-13

## 2020-01-13 ENCOUNTER — APPOINTMENT (OUTPATIENT)
Dept: INFUSION THERAPY | Facility: HOSPITAL | Age: 60
End: 2020-01-13

## 2020-01-13 LAB
BASOPHILS # BLD AUTO: 0.1 K/UL — SIGNIFICANT CHANGE UP (ref 0–0.2)
BASOPHILS NFR BLD AUTO: 1 % — SIGNIFICANT CHANGE UP (ref 0–2)
EOSINOPHIL # BLD AUTO: 0.2 K/UL — SIGNIFICANT CHANGE UP (ref 0–0.5)
EOSINOPHIL NFR BLD AUTO: 2.6 % — SIGNIFICANT CHANGE UP (ref 0–6)
HCT VFR BLD CALC: 34.9 % — SIGNIFICANT CHANGE UP (ref 34.5–45)
HGB BLD-MCNC: 11.6 G/DL — SIGNIFICANT CHANGE UP (ref 11.5–15.5)
LYMPHOCYTES # BLD AUTO: 2 K/UL — SIGNIFICANT CHANGE UP (ref 1–3.3)
LYMPHOCYTES # BLD AUTO: 34 % — SIGNIFICANT CHANGE UP (ref 13–44)
MCHC RBC-ENTMCNC: 27.8 PG — SIGNIFICANT CHANGE UP (ref 27–34)
MCHC RBC-ENTMCNC: 33.2 G/DL — SIGNIFICANT CHANGE UP (ref 32–36)
MCV RBC AUTO: 83.7 FL — SIGNIFICANT CHANGE UP (ref 80–100)
MONOCYTES # BLD AUTO: 0.5 K/UL — SIGNIFICANT CHANGE UP (ref 0–0.9)
MONOCYTES NFR BLD AUTO: 8.5 % — SIGNIFICANT CHANGE UP (ref 2–14)
NEUTROPHILS # BLD AUTO: 3.2 K/UL — SIGNIFICANT CHANGE UP (ref 1.8–7.4)
NEUTROPHILS NFR BLD AUTO: 53.9 % — SIGNIFICANT CHANGE UP (ref 43–77)
PLATELET # BLD AUTO: 138 K/UL — LOW (ref 150–400)
RBC # BLD: 4.18 M/UL — SIGNIFICANT CHANGE UP (ref 3.8–5.2)
RBC # FLD: 13 % — SIGNIFICANT CHANGE UP (ref 10.3–14.5)
WBC # BLD: 6 K/UL — SIGNIFICANT CHANGE UP (ref 3.8–10.5)
WBC # FLD AUTO: 6 K/UL — SIGNIFICANT CHANGE UP (ref 3.8–10.5)

## 2020-01-13 PROCEDURE — 90832 PSYTX W PT 30 MINUTES: CPT

## 2020-01-14 DIAGNOSIS — C18.9 MALIGNANT NEOPLASM OF COLON, UNSPECIFIED: ICD-10-CM

## 2020-01-14 DIAGNOSIS — R11.2 NAUSEA WITH VOMITING, UNSPECIFIED: ICD-10-CM

## 2020-01-14 DIAGNOSIS — Z51.11 ENCOUNTER FOR ANTINEOPLASTIC CHEMOTHERAPY: ICD-10-CM

## 2020-01-15 ENCOUNTER — APPOINTMENT (OUTPATIENT)
Dept: HEMATOLOGY ONCOLOGY | Facility: CLINIC | Age: 60
End: 2020-01-15
Payer: COMMERCIAL

## 2020-01-15 ENCOUNTER — APPOINTMENT (OUTPATIENT)
Dept: INFUSION THERAPY | Facility: HOSPITAL | Age: 60
End: 2020-01-15

## 2020-01-15 VITALS
SYSTOLIC BLOOD PRESSURE: 104 MMHG | WEIGHT: 127.43 LBS | HEART RATE: 70 BPM | BODY MASS INDEX: 19.38 KG/M2 | OXYGEN SATURATION: 100 % | RESPIRATION RATE: 18 BRPM | DIASTOLIC BLOOD PRESSURE: 70 MMHG | TEMPERATURE: 98.1 F

## 2020-01-15 DIAGNOSIS — F43.22 ADJUSTMENT DISORDER WITH ANXIETY: ICD-10-CM

## 2020-01-15 PROCEDURE — 99214 OFFICE O/P EST MOD 30 MIN: CPT

## 2020-01-15 NOTE — CONSULT LETTER
[Dear  ___] : Dear  [unfilled], [Consult Closing:] : Thank you very much for allowing me to participate in the care of this patient.  If you have any questions, please do not hesitate to contact me. [Please see my note below.] : Please see my note below. [Consult Letter:] : I had the pleasure of evaluating your patient, [unfilled]. [Sincerely,] : Sincerely, [DrVadim  ___] : Dr. ALVARADO [DrVadim ___] : Dr. ALVARADO

## 2020-01-16 NOTE — HISTORY OF PRESENT ILLNESS
[Disease: _____________________] : Disease: [unfilled] [T: ___] : T[unfilled] [N: ___] : N[unfilled] [M: ___] : M[unfilled] [AJCC Stage: ____] : AJCC Stage: [unfilled] [Date: ____________] : Patient's last distress assessment performed on [unfilled]. [4 - Distress Level] : Distress Level: 4 [Patient given social work contact information and resource sheet] : Patient was given social work contact information and resource sheet [de-identified] : Ms. Rutherford is a 58 year old female with past medical history of Graves disease presenting to the office for an initial consultation of colon CA.\par \par Patient c/o right LQ pain with occasional bloating, denies change in bowel habits for several months. Had a colonoscopy in 2012 that is self-reported as normal. 2017 pelvic sono was negative. \par \par She was seen by GI in late 10/2019.  CAT abdomen with contrast ordered which revealed cecal mass with enlarged LNs.  F/U colonoscopy revealed sessile SC polyp which was biopsied (hyperplastic) and a malignant appearing mass involving IC valve/cecum.\par Pathology: adenocarcinoma\par \par She was referred to Dr. Yuval Haynes on 11/6/2019 who recommended right colectomy.\par \par Patient underwent right hemicolectomy on 11/11/2019 with Dr. 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  CAT chest, abdomen and pelvis performed on 12/30/2019 demonstrated no evidence of metastatic disease.  Mediport placed on 12/3/2019.  Today her main complaint is cough which started on 12/4/2019.  Cough is productive FOR PAST DAY. We reviewed FOLFOX and side effects and purpose of treatment. Consent process performed and signed by patient.\par \par 12/24/19: \par Colon CA: Patient here for toxicity evaluation s/p FOLFOX C1 on 12/16/2019.  She tolerated infusion well and developed no anaphylaxis reactions.  Denies fever, chills, mouth sores, neuropathy, diarrhea or weight loss.  CBC with diff performed on 12/23/2019 was unremarkable.\par Broken Tooth: Patient noticed over the weekend her R molar broke off.  She was seen by her dentist who performed a filling however states patient will require a root canal.  \par Left gingiva swelling: Noted after her first treatment.  She was seen by her dentist who rx a local anesthetic with excellent relief.\par Fatigue: Mild fatigue 2-3 days after her first treatment however abates during her off week.  She is able to perform her routine/instrumental ADLs without restrictions.\par \par 1/15/2020: \par Colon CA: Today is C3D3 FOLFOX. Due to neutropenia during C2, 5-FU IVP was discontinued.  Patient has notice less fatigue afterwards.  She is doing well on treatment and reports no major adverse events.  She has intermittent cold induce neuropathy on days of treatment and 2-3 days afterwards however abates.  She denies fever, chills, mouth sores, diarrhea or mouth sores.\par Social: Patient in excellent spirits today.  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

## 2020-01-27 ENCOUNTER — LABORATORY RESULT (OUTPATIENT)
Age: 60
End: 2020-01-27

## 2020-01-27 ENCOUNTER — APPOINTMENT (OUTPATIENT)
Dept: HEMATOLOGY ONCOLOGY | Facility: CLINIC | Age: 60
End: 2020-01-27
Payer: COMMERCIAL

## 2020-01-27 ENCOUNTER — APPOINTMENT (OUTPATIENT)
Dept: INFUSION THERAPY | Facility: HOSPITAL | Age: 60
End: 2020-01-27

## 2020-01-27 ENCOUNTER — RESULT REVIEW (OUTPATIENT)
Age: 60
End: 2020-01-27

## 2020-01-27 LAB
BASOPHILS # BLD AUTO: 0.1 K/UL — SIGNIFICANT CHANGE UP (ref 0–0.2)
BASOPHILS NFR BLD AUTO: 1 % — SIGNIFICANT CHANGE UP (ref 0–2)
EOSINOPHIL # BLD AUTO: 0.1 K/UL — SIGNIFICANT CHANGE UP (ref 0–0.5)
EOSINOPHIL NFR BLD AUTO: 2.2 % — SIGNIFICANT CHANGE UP (ref 0–6)
HCT VFR BLD CALC: 33.3 % — LOW (ref 34.5–45)
HGB BLD-MCNC: 11.1 G/DL — LOW (ref 11.5–15.5)
LYMPHOCYTES # BLD AUTO: 1.4 K/UL — SIGNIFICANT CHANGE UP (ref 1–3.3)
LYMPHOCYTES # BLD AUTO: 27.1 % — SIGNIFICANT CHANGE UP (ref 13–44)
MCHC RBC-ENTMCNC: 27.9 PG — SIGNIFICANT CHANGE UP (ref 27–34)
MCHC RBC-ENTMCNC: 33.4 G/DL — SIGNIFICANT CHANGE UP (ref 32–36)
MCV RBC AUTO: 83.4 FL — SIGNIFICANT CHANGE UP (ref 80–100)
MONOCYTES # BLD AUTO: 0.7 K/UL — SIGNIFICANT CHANGE UP (ref 0–0.9)
MONOCYTES NFR BLD AUTO: 13.6 % — SIGNIFICANT CHANGE UP (ref 2–14)
NEUTROPHILS # BLD AUTO: 2.8 K/UL — SIGNIFICANT CHANGE UP (ref 1.8–7.4)
NEUTROPHILS NFR BLD AUTO: 56.1 % — SIGNIFICANT CHANGE UP (ref 43–77)
PLATELET # BLD AUTO: 115 K/UL — LOW (ref 150–400)
RBC # BLD: 3.99 M/UL — SIGNIFICANT CHANGE UP (ref 3.8–5.2)
RBC # FLD: 13.6 % — SIGNIFICANT CHANGE UP (ref 10.3–14.5)
WBC # BLD: 5.1 K/UL — SIGNIFICANT CHANGE UP (ref 3.8–10.5)
WBC # FLD AUTO: 5.1 K/UL — SIGNIFICANT CHANGE UP (ref 3.8–10.5)

## 2020-01-27 PROCEDURE — 90832 PSYTX W PT 30 MINUTES: CPT

## 2020-01-28 DIAGNOSIS — R11.2 NAUSEA WITH VOMITING, UNSPECIFIED: ICD-10-CM

## 2020-01-28 DIAGNOSIS — Z51.11 ENCOUNTER FOR ANTINEOPLASTIC CHEMOTHERAPY: ICD-10-CM

## 2020-01-29 ENCOUNTER — APPOINTMENT (OUTPATIENT)
Dept: INFUSION THERAPY | Facility: HOSPITAL | Age: 60
End: 2020-01-29

## 2020-01-30 ENCOUNTER — OUTPATIENT (OUTPATIENT)
Dept: OUTPATIENT SERVICES | Facility: HOSPITAL | Age: 60
LOS: 1 days | Discharge: HOME | End: 2020-01-30

## 2020-01-30 DIAGNOSIS — C18.9 MALIGNANT NEOPLASM OF COLON, UNSPECIFIED: ICD-10-CM

## 2020-01-30 DIAGNOSIS — Z98.891 HISTORY OF UTERINE SCAR FROM PREVIOUS SURGERY: Chronic | ICD-10-CM

## 2020-01-30 DIAGNOSIS — Z98.890 OTHER SPECIFIED POSTPROCEDURAL STATES: Chronic | ICD-10-CM

## 2020-02-06 ENCOUNTER — APPOINTMENT (OUTPATIENT)
Dept: HEMATOLOGY ONCOLOGY | Facility: CLINIC | Age: 60
End: 2020-02-06

## 2020-02-10 DIAGNOSIS — F43.22 ADJUSTMENT DISORDER WITH ANXIETY: ICD-10-CM

## 2020-02-12 ENCOUNTER — LABORATORY RESULT (OUTPATIENT)
Age: 60
End: 2020-02-12

## 2020-02-12 ENCOUNTER — APPOINTMENT (OUTPATIENT)
Dept: INFUSION THERAPY | Facility: HOSPITAL | Age: 60
End: 2020-02-12

## 2020-02-12 ENCOUNTER — RESULT REVIEW (OUTPATIENT)
Age: 60
End: 2020-02-12

## 2020-02-12 LAB
BASOPHILS # BLD AUTO: 0 K/UL — SIGNIFICANT CHANGE UP (ref 0–0.2)
BASOPHILS NFR BLD AUTO: 1 % — SIGNIFICANT CHANGE UP (ref 0–2)
EOSINOPHIL # BLD AUTO: 0.2 K/UL — SIGNIFICANT CHANGE UP (ref 0–0.5)
EOSINOPHIL NFR BLD AUTO: 1 % — SIGNIFICANT CHANGE UP (ref 0–6)
HCT VFR BLD CALC: 35 % — SIGNIFICANT CHANGE UP (ref 34.5–45)
HGB BLD-MCNC: 12.5 G/DL — SIGNIFICANT CHANGE UP (ref 11.5–15.5)
LYMPHOCYTES # BLD AUTO: 0.7 K/UL — LOW (ref 1–3.3)
LYMPHOCYTES # BLD AUTO: 35 % — SIGNIFICANT CHANGE UP (ref 13–44)
MCHC RBC-ENTMCNC: 35.5 PG — HIGH (ref 27–34)
MCHC RBC-ENTMCNC: 35.7 G/DL — SIGNIFICANT CHANGE UP (ref 32–36)
MCV RBC AUTO: 99.2 FL — SIGNIFICANT CHANGE UP (ref 80–100)
MONOCYTES # BLD AUTO: 0.8 K/UL — SIGNIFICANT CHANGE UP (ref 0–0.9)
MONOCYTES NFR BLD AUTO: 9 % — SIGNIFICANT CHANGE UP (ref 2–14)
NEUTROPHILS # BLD AUTO: 2.9 K/UL — SIGNIFICANT CHANGE UP (ref 1.8–7.4)
NEUTROPHILS NFR BLD AUTO: 54 % — SIGNIFICANT CHANGE UP (ref 43–77)
PLAT MORPH BLD: NORMAL — SIGNIFICANT CHANGE UP
PLATELET # BLD AUTO: 267 K/UL — SIGNIFICANT CHANGE UP (ref 150–400)
RBC # BLD: 3.53 M/UL — LOW (ref 3.8–5.2)
RBC # FLD: 12.2 % — SIGNIFICANT CHANGE UP (ref 10.3–14.5)
RBC BLD AUTO: SIGNIFICANT CHANGE UP
WBC # BLD: 4.7 K/UL — SIGNIFICANT CHANGE UP (ref 3.8–10.5)
WBC # FLD AUTO: 4.7 K/UL — SIGNIFICANT CHANGE UP (ref 3.8–10.5)

## 2020-02-13 DIAGNOSIS — R11.2 NAUSEA WITH VOMITING, UNSPECIFIED: ICD-10-CM

## 2020-02-13 DIAGNOSIS — Z51.11 ENCOUNTER FOR ANTINEOPLASTIC CHEMOTHERAPY: ICD-10-CM

## 2020-02-15 ENCOUNTER — APPOINTMENT (OUTPATIENT)
Dept: INFUSION THERAPY | Facility: HOSPITAL | Age: 60
End: 2020-02-15

## 2020-02-24 NOTE — CONSULT LETTER
[Dear  ___] : Dear  [unfilled], [Please see my note below.] : Please see my note below. [Consult Letter:] : I had the pleasure of evaluating your patient, [unfilled]. [Consult Closing:] : Thank you very much for allowing me to participate in the care of this patient.  If you have any questions, please do not hesitate to contact me. [Sincerely,] : Sincerely, [DrVadim  ___] : Dr. ALVARADO [DrVadim ___] : Dr. ALVARADO

## 2020-02-25 ENCOUNTER — RESULT REVIEW (OUTPATIENT)
Age: 60
End: 2020-02-25

## 2020-02-25 ENCOUNTER — OUTPATIENT (OUTPATIENT)
Dept: OUTPATIENT SERVICES | Facility: HOSPITAL | Age: 60
LOS: 1 days | Discharge: HOME | End: 2020-02-25

## 2020-02-25 ENCOUNTER — APPOINTMENT (OUTPATIENT)
Dept: HEMATOLOGY ONCOLOGY | Facility: CLINIC | Age: 60
End: 2020-02-25
Payer: COMMERCIAL

## 2020-02-25 VITALS
RESPIRATION RATE: 16 BRPM | TEMPERATURE: 97.8 F | OXYGEN SATURATION: 100 % | SYSTOLIC BLOOD PRESSURE: 130 MMHG | DIASTOLIC BLOOD PRESSURE: 81 MMHG | BODY MASS INDEX: 19.27 KG/M2 | WEIGHT: 126.74 LBS | HEART RATE: 76 BPM

## 2020-02-25 DIAGNOSIS — Z98.890 OTHER SPECIFIED POSTPROCEDURAL STATES: Chronic | ICD-10-CM

## 2020-02-25 DIAGNOSIS — Z98.891 HISTORY OF UTERINE SCAR FROM PREVIOUS SURGERY: Chronic | ICD-10-CM

## 2020-02-25 DIAGNOSIS — Z22.7 LATENT TUBERCULOSIS: ICD-10-CM

## 2020-02-25 DIAGNOSIS — C18.9 MALIGNANT NEOPLASM OF COLON, UNSPECIFIED: ICD-10-CM

## 2020-02-25 LAB
BASOPHILS # BLD AUTO: 0 K/UL — SIGNIFICANT CHANGE UP (ref 0–0.2)
BASOPHILS NFR BLD AUTO: 0.7 % — SIGNIFICANT CHANGE UP (ref 0–2)
EOSINOPHIL # BLD AUTO: 0.1 K/UL — SIGNIFICANT CHANGE UP (ref 0–0.5)
EOSINOPHIL NFR BLD AUTO: 3.1 % — SIGNIFICANT CHANGE UP (ref 0–6)
HCT VFR BLD CALC: 32.8 % — LOW (ref 34.5–45)
HGB BLD-MCNC: 10.6 G/DL — LOW (ref 11.5–15.5)
LYMPHOCYTES # BLD AUTO: 1.2 K/UL — SIGNIFICANT CHANGE UP (ref 1–3.3)
LYMPHOCYTES # BLD AUTO: 30.3 % — SIGNIFICANT CHANGE UP (ref 13–44)
MCHC RBC-ENTMCNC: 26.6 PG — LOW (ref 27–34)
MCHC RBC-ENTMCNC: 32.3 G/DL — SIGNIFICANT CHANGE UP (ref 32–36)
MCV RBC AUTO: 82.4 FL — SIGNIFICANT CHANGE UP (ref 80–100)
MONOCYTES # BLD AUTO: 0.6 K/UL — SIGNIFICANT CHANGE UP (ref 0–0.9)
MONOCYTES NFR BLD AUTO: 14.8 % — HIGH (ref 2–14)
NEUTROPHILS # BLD AUTO: 2 K/UL — SIGNIFICANT CHANGE UP (ref 1.8–7.4)
NEUTROPHILS NFR BLD AUTO: 51.1 % — SIGNIFICANT CHANGE UP (ref 43–77)
PLATELET # BLD AUTO: 84 K/UL — LOW (ref 150–400)
RBC # BLD: 3.98 M/UL — SIGNIFICANT CHANGE UP (ref 3.8–5.2)
RBC # FLD: 14.8 % — HIGH (ref 10.3–14.5)
WBC # BLD: 4 K/UL — SIGNIFICANT CHANGE UP (ref 3.8–10.5)
WBC # FLD AUTO: 4 K/UL — SIGNIFICANT CHANGE UP (ref 3.8–10.5)

## 2020-02-25 PROCEDURE — 99214 OFFICE O/P EST MOD 30 MIN: CPT

## 2020-02-25 RX ORDER — BENZONATATE 100 MG/1
100 CAPSULE ORAL
Qty: 30 | Refills: 2 | Status: DISCONTINUED | COMMUNITY
Start: 2019-12-12 | End: 2020-02-25

## 2020-02-25 RX ORDER — LORAZEPAM 0.5 MG/1
0.5 TABLET ORAL
Qty: 30 | Refills: 0 | Status: DISCONTINUED | COMMUNITY
Start: 2019-12-24 | End: 2020-02-25

## 2020-02-25 RX ORDER — PROCHLORPERAZINE MALEATE 10 MG/1
10 TABLET ORAL EVERY 6 HOURS
Qty: 20 | Refills: 2 | Status: DISCONTINUED | COMMUNITY
Start: 2019-12-12 | End: 2020-02-25

## 2020-02-25 NOTE — REVIEW OF SYSTEMS
[Anxiety] : anxiety [Negative] : Allergic/Immunologic [FreeTextEntry9] : low back discomfort, no UTI symptoms. [de-identified] : hypothyroidism

## 2020-02-25 NOTE — PHYSICAL EXAM
[Restricted in physically strenuous activity but ambulatory and able to carry out work of a light or sedentary nature] : Status 1- Restricted in physically strenuous activity but ambulatory and able to carry out work of a light or sedentary nature, e.g., light house work, office work [Normal] : affect appropriate [de-identified] : no flank or spine tenderness; no CVAT.

## 2020-02-25 NOTE — HISTORY OF PRESENT ILLNESS
[Disease: _____________________] : Disease: [unfilled] [T: ___] : T[unfilled] [N: ___] : N[unfilled] [M: ___] : M[unfilled] [AJCC Stage: ____] : AJCC Stage: [unfilled] [de-identified] : Ms. Rutherford is a 58 year old female with past medical history of Graves disease presenting to the office for an initial consultation of colon CA.\par \par Patient c/o right LQ pain with occasional bloating, denies change in bowel habits for several months. Had a colonoscopy in 2012 that is self-reported as normal. 2017 pelvic sono was negative. \par \par She was seen by GI in late 10/2019.  CAT abdomen with contrast ordered which revealed cecal mass with enlarged LNs.  F/U colonoscopy revealed sessile SC polyp which was biopsied (hyperplastic) and a malignant appearing mass involving IC valve/cecum.\par Pathology: adenocarcinoma\par \par Patient underwent right hemicolectomy on 11/11/2019 with Dr. 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  CAT chest, abdomen and pelvis performed on 12/30/2019 demonstrated no evidence of metastatic disease.  Mediport placed on 12/3/2019.  Today her main complaint is cough which started on 12/4/2019.  Cough is productive FOR PAST DAY. We reviewed FOLFOX and side effects and purpose of treatment. Consent process performed and signed by patient.\par \par 12/24/19: \par Colon CA: Patient here for toxicity evaluation s/p FOLFOX C1 on 12/16/2019.  She tolerated infusion well and developed no anaphylaxis reactions.  Denies fever, chills, mouth sores, neuropathy, diarrhea or weight loss.  CBC with diff performed on 12/23/2019 was unremarkable.\par Broken Tooth: Patient noticed over the weekend her R molar broke off.  She was seen by her dentist who performed a filling however states patient will require a root canal.  \par Left gingiva swelling: Noted after her first treatment.  She was seen by her dentist who rx a local anesthetic with excellent relief.\par Fatigue: Mild fatigue 2-3 days after her first treatment however abates during her off week.  She is able to perform her routine/instrumental ADLs without restrictions.\par \par 1/15/2020: \par Colon CA: Today is C3D3 FOLFOX. Due to neutropenia during C2, 5-FU IVP was discontinued.  Patient has notice less fatigue afterwards.  She is doing well on treatment and reports no major adverse events.  She has intermittent cold induce neuropathy on days of treatment and 2-3 days afterwards however abates.  She denies fever, chills, mouth sores, diarrhea or mouth sores.\par Social: Patient in excellent spirits today.  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 She notices that the neuropathy is totally gone in between treatments. However she must avoid cold entirely. She has been very active in teaching and work. She has been singing regularly and will go to [employer] in a week to sing. She went to [place] 2 weeks ago for ~ 4 days to visit her ill mother who is suffering from dementia. Overall, she is managing the psychological component of her treatment pretty well; and had relied on strategies from Dr. Moscoso, and support from friends. She feels relatively weak since the surgery and chemotherapy and is asking if she can participate in pilates classes and mild/moderate strengthening exercises, to which I supported.  She does not some left low back/flank pain in the AM and is concerned about her kidney. [de-identified] : adenocarcinoma [de-identified] : Surgeon: Yuval Haynse; [phone]\par PCP: Kimberly Melton; [phone]\par GI: Yuval Mendez; [phone]\par \par Patient Cell # [phone]\par \par

## 2020-02-26 ENCOUNTER — APPOINTMENT (OUTPATIENT)
Dept: INFUSION THERAPY | Facility: HOSPITAL | Age: 60
End: 2020-02-26

## 2020-02-29 ENCOUNTER — APPOINTMENT (OUTPATIENT)
Dept: INFUSION THERAPY | Facility: HOSPITAL | Age: 60
End: 2020-02-29

## 2020-03-04 ENCOUNTER — RESULT REVIEW (OUTPATIENT)
Age: 60
End: 2020-03-04

## 2020-03-04 ENCOUNTER — LABORATORY RESULT (OUTPATIENT)
Age: 60
End: 2020-03-04

## 2020-03-04 ENCOUNTER — APPOINTMENT (OUTPATIENT)
Dept: HEMATOLOGY ONCOLOGY | Facility: CLINIC | Age: 60
End: 2020-03-04
Payer: COMMERCIAL

## 2020-03-04 ENCOUNTER — APPOINTMENT (OUTPATIENT)
Dept: INFUSION THERAPY | Facility: HOSPITAL | Age: 60
End: 2020-03-04

## 2020-03-04 LAB
BASOPHILS # BLD AUTO: 0.01 K/UL — SIGNIFICANT CHANGE UP (ref 0–0.2)
BASOPHILS NFR BLD AUTO: 0.3 % — SIGNIFICANT CHANGE UP (ref 0–2)
EOSINOPHIL # BLD AUTO: 0.06 K/UL — SIGNIFICANT CHANGE UP (ref 0–0.5)
EOSINOPHIL NFR BLD AUTO: 1.8 % — SIGNIFICANT CHANGE UP (ref 0–6)
HCT VFR BLD CALC: 32.3 % — LOW (ref 34.5–45)
HGB BLD-MCNC: 10.6 G/DL — LOW (ref 11.5–15.5)
IMM GRANULOCYTES NFR BLD AUTO: 0.3 % — SIGNIFICANT CHANGE UP (ref 0–1.5)
LYMPHOCYTES # BLD AUTO: 1.09 K/UL — SIGNIFICANT CHANGE UP (ref 1–3.3)
LYMPHOCYTES # BLD AUTO: 32.6 % — SIGNIFICANT CHANGE UP (ref 13–44)
MCHC RBC-ENTMCNC: 26.6 PG — LOW (ref 27–34)
MCHC RBC-ENTMCNC: 32.8 GM/DL — SIGNIFICANT CHANGE UP (ref 32–36)
MCV RBC AUTO: 81 FL — SIGNIFICANT CHANGE UP (ref 80–100)
MONOCYTES # BLD AUTO: 0.54 K/UL — SIGNIFICANT CHANGE UP (ref 0–0.9)
MONOCYTES NFR BLD AUTO: 16.2 % — HIGH (ref 2–14)
NEUTROPHILS # BLD AUTO: 1.63 K/UL — LOW (ref 1.8–7.4)
NEUTROPHILS NFR BLD AUTO: 48.8 % — SIGNIFICANT CHANGE UP (ref 43–77)
NRBC # BLD: 0 /100 WBCS — SIGNIFICANT CHANGE UP (ref 0–0)
PLATELET # BLD AUTO: 120 K/UL — LOW (ref 150–400)
RBC # BLD: 3.99 M/UL — SIGNIFICANT CHANGE UP (ref 3.8–5.2)
RBC # FLD: 16 % — HIGH (ref 10.3–14.5)
WBC # BLD: 3.34 K/UL — LOW (ref 3.8–10.5)
WBC # FLD AUTO: 3.34 K/UL — LOW (ref 3.8–10.5)

## 2020-03-04 PROCEDURE — 90834 PSYTX W PT 45 MINUTES: CPT

## 2020-03-05 DIAGNOSIS — Z51.11 ENCOUNTER FOR ANTINEOPLASTIC CHEMOTHERAPY: ICD-10-CM

## 2020-03-05 DIAGNOSIS — R11.2 NAUSEA WITH VOMITING, UNSPECIFIED: ICD-10-CM

## 2020-03-07 ENCOUNTER — APPOINTMENT (OUTPATIENT)
Dept: INFUSION THERAPY | Facility: HOSPITAL | Age: 60
End: 2020-03-07

## 2020-03-11 ENCOUNTER — APPOINTMENT (OUTPATIENT)
Dept: INFUSION THERAPY | Facility: HOSPITAL | Age: 60
End: 2020-03-11

## 2020-03-11 ENCOUNTER — APPOINTMENT (OUTPATIENT)
Dept: HEMATOLOGY ONCOLOGY | Facility: CLINIC | Age: 60
End: 2020-03-11
Payer: COMMERCIAL

## 2020-03-11 VITALS
TEMPERATURE: 97.6 F | RESPIRATION RATE: 16 BRPM | BODY MASS INDEX: 19.01 KG/M2 | WEIGHT: 125 LBS | HEART RATE: 71 BPM | OXYGEN SATURATION: 100 % | DIASTOLIC BLOOD PRESSURE: 90 MMHG | SYSTOLIC BLOOD PRESSURE: 134 MMHG

## 2020-03-11 DIAGNOSIS — R53.83 OTHER FATIGUE: ICD-10-CM

## 2020-03-11 PROCEDURE — 99214 OFFICE O/P EST MOD 30 MIN: CPT

## 2020-03-11 NOTE — HISTORY OF PRESENT ILLNESS
[Disease: _____________________] : Disease: [unfilled] [T: ___] : T[unfilled] [N: ___] : N[unfilled] [M: ___] : M[unfilled] [AJCC Stage: ____] : AJCC Stage: [unfilled] [de-identified] : Ms. Rutherford is a 58 year old female with past medical history of Graves disease presenting to the office for an initial consultation of colon CA.\par \par Patient c/o right LQ pain with occasional bloating, denies change in bowel habits for several months. Had a colonoscopy in 2012 that is self-reported as normal. 2017 pelvic sono was negative. \par \par She was seen by GI in late 10/2019.  CAT abdomen with contrast ordered which revealed cecal mass with enlarged LNs.  F/U colonoscopy revealed sessile SC polyp which was biopsied (hyperplastic) and a malignant appearing mass involving IC valve/cecum.\par Pathology: adenocarcinoma\par \par Patient underwent right hemicolectomy on 11/11/2019 with Dr. 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  CAT chest, abdomen and pelvis performed on 12/30/2019 demonstrated no evidence of metastatic disease.  Mediport placed on 12/3/2019.  Today her main complaint is cough which started on 12/4/2019.  Cough is productive FOR PAST DAY. We reviewed FOLFOX and side effects and purpose of treatment. Consent process performed and signed by patient.\par \par 12/24/19: \par Colon CA: Patient here for toxicity evaluation s/p FOLFOX C1 on 12/16/2019.  She tolerated infusion well and developed no anaphylaxis reactions.  Denies fever, chills, mouth sores, neuropathy, diarrhea or weight loss.  CBC with diff performed on 12/23/2019 was unremarkable.\par Broken Tooth: Patient noticed over the weekend her R molar broke off.  She was seen by her dentist who performed a filling however states patient will require a root canal.  \par Left gingiva swelling: Noted after her first treatment.  She was seen by her dentist who rx a local anesthetic with excellent relief.\par Fatigue: Mild fatigue 2-3 days after her first treatment however abates during her off week.  She is able to perform her routine/instrumental ADLs without restrictions.\par \par 1/15/2020: \par Colon CA: Today is C3D3 FOLFOX. Due to neutropenia during C2, 5-FU IVP was discontinued.  Patient has notice less fatigue afterwards.  She is doing well on treatment and reports no major adverse events.  She has intermittent cold induce neuropathy on days of treatment and 2-3 days afterwards however abates.  She denies fever, chills, mouth sores, diarrhea or mouth sores.\par Social: Patient in excellent spirits today.  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 She notices that the neuropathy is totally gone in between treatments. However she must avoid cold entirely. She has been very active in teaching and work. She has been singing regularly and will go to [place] in a week to sing. She went to [place] 2 weeks ago for ~ 4 days to visit her ill mother who is suffering from dementia. Overall, she is managing the psychological component of her treatment pretty well; and had relied on strategies from Dr. Moscoso, and support from friends. She feels relatively weak since the surgery and chemotherapy and is asking if she can participate in pilates classes and mild/moderate strengthening exercises, to which I supported.  She does not some left low back/flank pain in the AM and is concerned about her kidney.\par \par 3/11/2020:\par 1) Colon CA: C6 FOLFOX given on 3/4/2020.  Today her main complaint is fatigue.\par 2) Fatigue: Started on D4 of her last treatment.  Up until that point, she was doing extremely well.  On day 4-6 patient felt increase in fatigue.  She states she was in bed all day sleeping and is now fully recovering.  She is able to perform her routine/instrumental ADLs without any restrictions.  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   Patient has noticed she is pushing her self too much with working full time, taking care of her child and going to gym.  We have decided to cut back on work and see how patient responds after C7 of FOLFOX.\par 3) Anxiety: She currently works in [place] and is worried about her exposure to COVID-19.  She has decided to take 2-3 weeks off from work.\par  [de-identified] : adenocarcinoma [de-identified] : Surgeon: Yuval Haynes; [phone]\par PCP: Kimberly Melton; [phone]\par GI: Yuval Mendez; [phone]\par \par Patient Cell # [phone]\par \par

## 2020-03-11 NOTE — REVIEW OF SYSTEMS
[Anxiety] : anxiety [Negative] : Allergic/Immunologic [FreeTextEntry2] : mild fatigue but is able to push through. [FreeTextEntry9] : low back discomfort, no UTI symptoms. [de-identified] : hypothyroidism

## 2020-03-14 ENCOUNTER — APPOINTMENT (OUTPATIENT)
Dept: INFUSION THERAPY | Facility: HOSPITAL | Age: 60
End: 2020-03-14

## 2020-03-18 ENCOUNTER — LABORATORY RESULT (OUTPATIENT)
Age: 60
End: 2020-03-18

## 2020-03-18 ENCOUNTER — RESULT REVIEW (OUTPATIENT)
Age: 60
End: 2020-03-18

## 2020-03-18 ENCOUNTER — APPOINTMENT (OUTPATIENT)
Dept: INFUSION THERAPY | Facility: HOSPITAL | Age: 60
End: 2020-03-18

## 2020-03-18 ENCOUNTER — APPOINTMENT (OUTPATIENT)
Dept: HEMATOLOGY ONCOLOGY | Facility: CLINIC | Age: 60
End: 2020-03-18
Payer: COMMERCIAL

## 2020-03-18 LAB
BASOPHILS # BLD AUTO: 0.02 K/UL — SIGNIFICANT CHANGE UP (ref 0–0.2)
BASOPHILS NFR BLD AUTO: 0.5 % — SIGNIFICANT CHANGE UP (ref 0–2)
EOSINOPHIL # BLD AUTO: 0.03 K/UL — SIGNIFICANT CHANGE UP (ref 0–0.5)
EOSINOPHIL NFR BLD AUTO: 0.7 % — SIGNIFICANT CHANGE UP (ref 0–6)
HCT VFR BLD CALC: 36.1 % — SIGNIFICANT CHANGE UP (ref 34.5–45)
HGB BLD-MCNC: 11.3 G/DL — LOW (ref 11.5–15.5)
IMM GRANULOCYTES NFR BLD AUTO: 0.5 % — SIGNIFICANT CHANGE UP (ref 0–1.5)
LYMPHOCYTES # BLD AUTO: 1.03 K/UL — SIGNIFICANT CHANGE UP (ref 1–3.3)
LYMPHOCYTES # BLD AUTO: 23.3 % — SIGNIFICANT CHANGE UP (ref 13–44)
MCHC RBC-ENTMCNC: 26 PG — LOW (ref 27–34)
MCHC RBC-ENTMCNC: 31.3 GM/DL — LOW (ref 32–36)
MCV RBC AUTO: 83.2 FL — SIGNIFICANT CHANGE UP (ref 80–100)
MONOCYTES # BLD AUTO: 0.61 K/UL — SIGNIFICANT CHANGE UP (ref 0–0.9)
MONOCYTES NFR BLD AUTO: 13.8 % — SIGNIFICANT CHANGE UP (ref 2–14)
NEUTROPHILS # BLD AUTO: 2.72 K/UL — SIGNIFICANT CHANGE UP (ref 1.8–7.4)
NEUTROPHILS NFR BLD AUTO: 61.2 % — SIGNIFICANT CHANGE UP (ref 43–77)
NRBC # BLD: 0 /100 WBCS — SIGNIFICANT CHANGE UP (ref 0–0)
PLATELET # BLD AUTO: 100 K/UL — LOW (ref 150–400)
RBC # BLD: 4.34 M/UL — SIGNIFICANT CHANGE UP (ref 3.8–5.2)
RBC # FLD: 17.2 % — HIGH (ref 10.3–14.5)
WBC # BLD: 4.43 K/UL — SIGNIFICANT CHANGE UP (ref 3.8–10.5)
WBC # FLD AUTO: 4.43 K/UL — SIGNIFICANT CHANGE UP (ref 3.8–10.5)

## 2020-03-18 PROCEDURE — 90834 PSYTX W PT 45 MINUTES: CPT

## 2020-03-21 ENCOUNTER — APPOINTMENT (OUTPATIENT)
Dept: INFUSION THERAPY | Facility: HOSPITAL | Age: 60
End: 2020-03-21

## 2020-03-25 ENCOUNTER — APPOINTMENT (OUTPATIENT)
Dept: INFUSION THERAPY | Facility: HOSPITAL | Age: 60
End: 2020-03-25

## 2020-03-25 ENCOUNTER — OUTPATIENT (OUTPATIENT)
Dept: OUTPATIENT SERVICES | Facility: HOSPITAL | Age: 60
LOS: 1 days | Discharge: HOME | End: 2020-03-25

## 2020-03-25 DIAGNOSIS — Z98.890 OTHER SPECIFIED POSTPROCEDURAL STATES: Chronic | ICD-10-CM

## 2020-03-25 DIAGNOSIS — C18.9 MALIGNANT NEOPLASM OF COLON, UNSPECIFIED: ICD-10-CM

## 2020-03-25 DIAGNOSIS — Z98.891 HISTORY OF UTERINE SCAR FROM PREVIOUS SURGERY: Chronic | ICD-10-CM

## 2020-03-28 ENCOUNTER — APPOINTMENT (OUTPATIENT)
Dept: INFUSION THERAPY | Facility: HOSPITAL | Age: 60
End: 2020-03-28

## 2020-04-01 ENCOUNTER — APPOINTMENT (OUTPATIENT)
Dept: INFUSION THERAPY | Facility: HOSPITAL | Age: 60
End: 2020-04-01

## 2020-04-01 ENCOUNTER — APPOINTMENT (OUTPATIENT)
Dept: HEMATOLOGY ONCOLOGY | Facility: CLINIC | Age: 60
End: 2020-04-01

## 2020-04-01 ENCOUNTER — APPOINTMENT (OUTPATIENT)
Dept: HEMATOLOGY ONCOLOGY | Facility: CLINIC | Age: 60
End: 2020-04-01
Payer: COMMERCIAL

## 2020-04-01 ENCOUNTER — RESULT REVIEW (OUTPATIENT)
Age: 60
End: 2020-04-01

## 2020-04-01 ENCOUNTER — LABORATORY RESULT (OUTPATIENT)
Age: 60
End: 2020-04-01

## 2020-04-01 DIAGNOSIS — F43.22 ADJUSTMENT DISORDER WITH ANXIETY: ICD-10-CM

## 2020-04-01 DIAGNOSIS — M54.5 LOW BACK PAIN: ICD-10-CM

## 2020-04-01 DIAGNOSIS — G89.29 LOW BACK PAIN: ICD-10-CM

## 2020-04-01 LAB
BASOPHILS # BLD AUTO: 0.02 K/UL — SIGNIFICANT CHANGE UP (ref 0–0.2)
BASOPHILS NFR BLD AUTO: 0.4 % — SIGNIFICANT CHANGE UP (ref 0–2)
EOSINOPHIL # BLD AUTO: 0.05 K/UL — SIGNIFICANT CHANGE UP (ref 0–0.5)
EOSINOPHIL NFR BLD AUTO: 1 % — SIGNIFICANT CHANGE UP (ref 0–6)
HCT VFR BLD CALC: 34.3 % — LOW (ref 34.5–45)
HGB BLD-MCNC: 10.8 G/DL — LOW (ref 11.5–15.5)
IMM GRANULOCYTES NFR BLD AUTO: 0.4 % — SIGNIFICANT CHANGE UP (ref 0–1.5)
LYMPHOCYTES # BLD AUTO: 1.29 K/UL — SIGNIFICANT CHANGE UP (ref 1–3.3)
LYMPHOCYTES # BLD AUTO: 25.8 % — SIGNIFICANT CHANGE UP (ref 13–44)
MCHC RBC-ENTMCNC: 26.1 PG — LOW (ref 27–34)
MCHC RBC-ENTMCNC: 31.5 GM/DL — LOW (ref 32–36)
MCV RBC AUTO: 82.9 FL — SIGNIFICANT CHANGE UP (ref 80–100)
MONOCYTES # BLD AUTO: 0.7 K/UL — SIGNIFICANT CHANGE UP (ref 0–0.9)
MONOCYTES NFR BLD AUTO: 14 % — SIGNIFICANT CHANGE UP (ref 2–14)
NEUTROPHILS # BLD AUTO: 2.92 K/UL — SIGNIFICANT CHANGE UP (ref 1.8–7.4)
NEUTROPHILS NFR BLD AUTO: 58.4 % — SIGNIFICANT CHANGE UP (ref 43–77)
NRBC # BLD: 0 /100 WBCS — SIGNIFICANT CHANGE UP (ref 0–0)
PLATELET # BLD AUTO: 99 K/UL — LOW (ref 150–400)
RBC # BLD: 4.14 M/UL — SIGNIFICANT CHANGE UP (ref 3.8–5.2)
RBC # FLD: 17.8 % — HIGH (ref 10.3–14.5)
WBC # BLD: 5 K/UL — SIGNIFICANT CHANGE UP (ref 3.8–10.5)
WBC # FLD AUTO: 5 K/UL — SIGNIFICANT CHANGE UP (ref 3.8–10.5)

## 2020-04-01 PROCEDURE — 99214 OFFICE O/P EST MOD 30 MIN: CPT

## 2020-04-01 PROCEDURE — 90832 PSYTX W PT 30 MINUTES: CPT

## 2020-04-01 NOTE — REVIEW OF SYSTEMS
[FreeTextEntry2] : mild fatigue but is able to push through. [FreeTextEntry9] : low back discomfort, no UTI symptoms. [de-identified] : hypothyroidism

## 2020-04-01 NOTE — HISTORY OF PRESENT ILLNESS
[de-identified] : Ms. Rutherford is a 58 year old female with past medical history of Graves disease presenting to the office for an initial consultation of colon CA.\par \par Patient c/o right LQ pain with occasional bloating, denies change in bowel habits for several months. Had a colonoscopy in 2012 that is self-reported as normal. 2017 pelvic sono was negative. \par \par She was seen by GI in late 10/2019.  CAT abdomen with contrast ordered which revealed cecal mass with enlarged LNs.  F/U colonoscopy revealed sessile SC polyp which was biopsied (hyperplastic) and a malignant appearing mass involving IC valve/cecum.\par Pathology: adenocarcinoma\par \par Patient underwent right hemicolectomy on 11/11/2019 with Dr. 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  CAT chest, abdomen and pelvis performed on 12/30/2019 demonstrated no evidence of metastatic disease.  Mediport placed on 12/3/2019.  Today her main complaint is cough which started on 12/4/2019.  Cough is productive FOR PAST DAY. We reviewed FOLFOX and side effects and purpose of treatment. Consent process performed and signed by patient.\par \par 12/24/19: \par Colon CA: Patient here for toxicity evaluation s/p FOLFOX C1 on 12/16/2019.  She tolerated infusion well and developed no anaphylaxis reactions.  Denies fever, chills, mouth sores, neuropathy, diarrhea or weight loss.  CBC with diff performed on 12/23/2019 was unremarkable.\par Broken Tooth: Patient noticed over the weekend her R molar broke off.  She was seen by her dentist who performed a filling however states patient will require a root canal.  \par Left gingiva swelling: Noted after her first treatment.  She was seen by her dentist who rx a local anesthetic with excellent relief.\par Fatigue: Mild fatigue 2-3 days after her first treatment however abates during her off week.  She is able to perform her routine/instrumental ADLs without restrictions.\par \par 1/15/2020: \par Colon CA: Today is C3D3 FOLFOX. Due to neutropenia during C2, 5-FU IVP was discontinued.  Patient has notice less fatigue afterwards.  She is doing well on treatment and reports no major adverse events.  She has intermittent cold induce neuropathy on days of treatment and 2-3 days afterwards however abates.  She denies fever, chills, mouth sores, diarrhea or mouth sores.\par Social: Patient in excellent spirits today.  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 She notices that the neuropathy is totally gone in between treatments. However she must avoid cold entirely. She has been very active in teaching and work. She has been singing regularly and will go to [place] in a week to sing. She went to [place] 2 weeks ago for ~ 4 days to visit her ill mother who is suffering from dementia. Overall, she is managing the psychological component of her treatment pretty well; and had relied on strategies from Dr. Moscoso, and support from friends. She feels relatively weak since the surgery and chemotherapy and is asking if she can participate in pilates classes and mild/moderate strengthening exercises, to which I supported.  She does not some left low back/flank pain in the AM and is concerned about her kidney.\par \par 3/11/2020:\par 1) Colon CA: C6 FOLFOX given on 3/4/2020.  Today her main complaint is fatigue.\par 2) Fatigue: Started on D4 of her last treatment.  Up until that point, she was doing extremely well.  On day 4-6 patient felt increase in fatigue.  She states she was in bed all day sleeping and is now fully recovering.  She is able to perform her routine/instrumental ADLs without any restrictions.  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   Patient has noticed she is pushing her self too much with working full time, taking care of her child and going to gym.  We have decided to cut back on work and see how patient responds after C7 of FOLFOX.\par 3) Anxiety: She currently works in [place] and is worried about her exposure to COVID-19.  She has decided to take 2-3 weeks off from work.\par \par 4/1/2020:\par 1) Colon CA: Today is C8 FOLFOX.\par 2) Neuropathy: Patient notices neuropathy located on her bilateral fingertips on days of treatment especially if she's exposed to cold temperatures.   Generally speaking patient wears mittens and notices if she is active the neuropathy tends to improve.  She denies neuropathy on her off week and denies it today.  She is able to functions and use her hands without any restrictions.\par 3) Social She is currently not working due to COVID-19 pandemic.\par 4) Left flank pain: Patient is complaining of intermittent lower back/flank pain that generally happens at night time.  She denies fever, chills, dysuria, hematuria or lifting heavy object.  She is concerned its her kidneys however her renal functions are WNL and last scan on 11/2019 demonstrated no abnormality in the kidneys.\par  [de-identified] : adenocarcinoma [de-identified] : Surgeon: Yuval Haynes; [phone]\par PCP: Kimberly Melton; [phone]\par GI: Yuval Mendez; [phone]\par \par Patient Cell # [phone]\par \par

## 2020-04-02 DIAGNOSIS — Z51.11 ENCOUNTER FOR ANTINEOPLASTIC CHEMOTHERAPY: ICD-10-CM

## 2020-04-02 DIAGNOSIS — R11.2 NAUSEA WITH VOMITING, UNSPECIFIED: ICD-10-CM

## 2020-04-04 ENCOUNTER — APPOINTMENT (OUTPATIENT)
Dept: INFUSION THERAPY | Facility: HOSPITAL | Age: 60
End: 2020-04-04

## 2020-04-13 PROBLEM — F43.22 ADJUSTMENT DISORDER WITH ANXIOUS MOOD: Status: ACTIVE | Noted: 2019-12-20

## 2020-04-15 ENCOUNTER — LABORATORY RESULT (OUTPATIENT)
Age: 60
End: 2020-04-15

## 2020-04-15 ENCOUNTER — APPOINTMENT (OUTPATIENT)
Dept: INFUSION THERAPY | Facility: HOSPITAL | Age: 60
End: 2020-04-15

## 2020-04-15 ENCOUNTER — RESULT REVIEW (OUTPATIENT)
Age: 60
End: 2020-04-15

## 2020-04-15 LAB
BASOPHILS # BLD AUTO: 0.02 K/UL — SIGNIFICANT CHANGE UP (ref 0–0.2)
BASOPHILS NFR BLD AUTO: 0.4 % — SIGNIFICANT CHANGE UP (ref 0–2)
EOSINOPHIL # BLD AUTO: 0.03 K/UL — SIGNIFICANT CHANGE UP (ref 0–0.5)
EOSINOPHIL NFR BLD AUTO: 0.6 % — SIGNIFICANT CHANGE UP (ref 0–6)
HCT VFR BLD CALC: 35.3 % — SIGNIFICANT CHANGE UP (ref 34.5–45)
HGB BLD-MCNC: 11.3 G/DL — LOW (ref 11.5–15.5)
IMM GRANULOCYTES NFR BLD AUTO: 0.2 % — SIGNIFICANT CHANGE UP (ref 0–1.5)
LYMPHOCYTES # BLD AUTO: 0.94 K/UL — LOW (ref 1–3.3)
LYMPHOCYTES # BLD AUTO: 18.3 % — SIGNIFICANT CHANGE UP (ref 13–44)
MCHC RBC-ENTMCNC: 26.6 PG — LOW (ref 27–34)
MCHC RBC-ENTMCNC: 32 GM/DL — SIGNIFICANT CHANGE UP (ref 32–36)
MCV RBC AUTO: 83.1 FL — SIGNIFICANT CHANGE UP (ref 80–100)
MONOCYTES # BLD AUTO: 0.61 K/UL — SIGNIFICANT CHANGE UP (ref 0–0.9)
MONOCYTES NFR BLD AUTO: 11.9 % — SIGNIFICANT CHANGE UP (ref 2–14)
NEUTROPHILS # BLD AUTO: 3.53 K/UL — SIGNIFICANT CHANGE UP (ref 1.8–7.4)
NEUTROPHILS NFR BLD AUTO: 68.6 % — SIGNIFICANT CHANGE UP (ref 43–77)
NRBC # BLD: 0 /100 WBCS — SIGNIFICANT CHANGE UP (ref 0–0)
PLATELET # BLD AUTO: 96 K/UL — LOW (ref 150–400)
RBC # BLD: 4.25 M/UL — SIGNIFICANT CHANGE UP (ref 3.8–5.2)
RBC # FLD: 18.2 % — HIGH (ref 10.3–14.5)
WBC # BLD: 5.14 K/UL — SIGNIFICANT CHANGE UP (ref 3.8–10.5)
WBC # FLD AUTO: 5.14 K/UL — SIGNIFICANT CHANGE UP (ref 3.8–10.5)

## 2020-04-22 ENCOUNTER — APPOINTMENT (OUTPATIENT)
Dept: HEMATOLOGY ONCOLOGY | Facility: CLINIC | Age: 60
End: 2020-04-22
Payer: COMMERCIAL

## 2020-04-22 ENCOUNTER — LABORATORY RESULT (OUTPATIENT)
Age: 60
End: 2020-04-22

## 2020-04-22 ENCOUNTER — RESULT REVIEW (OUTPATIENT)
Age: 60
End: 2020-04-22

## 2020-04-22 ENCOUNTER — APPOINTMENT (OUTPATIENT)
Dept: INFUSION THERAPY | Facility: HOSPITAL | Age: 60
End: 2020-04-22

## 2020-04-22 LAB
BASOPHILS # BLD AUTO: 0.04 K/UL — SIGNIFICANT CHANGE UP (ref 0–0.2)
BASOPHILS NFR BLD AUTO: 1 % — SIGNIFICANT CHANGE UP (ref 0–2)
EOSINOPHIL # BLD AUTO: 0.08 K/UL — SIGNIFICANT CHANGE UP (ref 0–0.5)
EOSINOPHIL NFR BLD AUTO: 2 % — SIGNIFICANT CHANGE UP (ref 0–6)
HCT VFR BLD CALC: 35.6 % — SIGNIFICANT CHANGE UP (ref 34.5–45)
HGB BLD-MCNC: 11.4 G/DL — LOW (ref 11.5–15.5)
IMM GRANULOCYTES NFR BLD AUTO: 0.5 % — SIGNIFICANT CHANGE UP (ref 0–1.5)
LYMPHOCYTES # BLD AUTO: 1.07 K/UL — SIGNIFICANT CHANGE UP (ref 1–3.3)
LYMPHOCYTES # BLD AUTO: 26.2 % — SIGNIFICANT CHANGE UP (ref 13–44)
MCHC RBC-ENTMCNC: 27 PG — SIGNIFICANT CHANGE UP (ref 27–34)
MCHC RBC-ENTMCNC: 32 GM/DL — SIGNIFICANT CHANGE UP (ref 32–36)
MCV RBC AUTO: 84.4 FL — SIGNIFICANT CHANGE UP (ref 80–100)
MONOCYTES # BLD AUTO: 0.58 K/UL — SIGNIFICANT CHANGE UP (ref 0–0.9)
MONOCYTES NFR BLD AUTO: 14.2 % — HIGH (ref 2–14)
NEUTROPHILS # BLD AUTO: 2.29 K/UL — SIGNIFICANT CHANGE UP (ref 1.8–7.4)
NEUTROPHILS NFR BLD AUTO: 56.1 % — SIGNIFICANT CHANGE UP (ref 43–77)
NRBC # BLD: 0 /100 WBCS — SIGNIFICANT CHANGE UP (ref 0–0)
PLATELET # BLD AUTO: 119 K/UL — LOW (ref 150–400)
RBC # BLD: 4.22 M/UL — SIGNIFICANT CHANGE UP (ref 3.8–5.2)
RBC # FLD: 18.6 % — HIGH (ref 10.3–14.5)
WBC # BLD: 4.08 K/UL — SIGNIFICANT CHANGE UP (ref 3.8–10.5)
WBC # FLD AUTO: 4.08 K/UL — SIGNIFICANT CHANGE UP (ref 3.8–10.5)

## 2020-04-22 PROCEDURE — 99214 OFFICE O/P EST MOD 30 MIN: CPT

## 2020-04-23 ENCOUNTER — OUTPATIENT (OUTPATIENT)
Dept: OUTPATIENT SERVICES | Facility: HOSPITAL | Age: 60
LOS: 1 days | Discharge: HOME | End: 2020-04-23

## 2020-04-23 DIAGNOSIS — Z98.891 HISTORY OF UTERINE SCAR FROM PREVIOUS SURGERY: Chronic | ICD-10-CM

## 2020-04-23 DIAGNOSIS — Z98.890 OTHER SPECIFIED POSTPROCEDURAL STATES: Chronic | ICD-10-CM

## 2020-04-23 DIAGNOSIS — C18.9 MALIGNANT NEOPLASM OF COLON, UNSPECIFIED: ICD-10-CM

## 2020-04-25 ENCOUNTER — APPOINTMENT (OUTPATIENT)
Dept: INFUSION THERAPY | Facility: HOSPITAL | Age: 60
End: 2020-04-25

## 2020-04-27 NOTE — HISTORY OF PRESENT ILLNESS
[Disease: _____________________] : Disease: [unfilled] [T: ___] : T[unfilled] [N: ___] : N[unfilled] [M: ___] : M[unfilled] [AJCC Stage: ____] : AJCC Stage: [unfilled] [de-identified] : Ms. Rutherford is a 58 year old female with past medical history of Graves disease presenting to the office for an initial consultation of colon CA.\par \par Patient c/o right LQ pain with occasional bloating, denies change in bowel habits for several months. Had a colonoscopy in 2012 that is self-reported as normal. 2017 pelvic sono was negative. \par \par She was seen by GI in late 10/2019.  CAT abdomen with contrast ordered which revealed cecal mass with enlarged LNs.  F/U colonoscopy revealed sessile SC polyp which was biopsied (hyperplastic) and a malignant appearing mass involving IC valve/cecum.\par Pathology: adenocarcinoma\par \par Patient underwent right hemicolectomy on 11/11/2019 with Dr. 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  CAT chest, abdomen and pelvis performed on 12/30/2019 demonstrated no evidence of metastatic disease.  Mediport placed on 12/3/2019.  Today her main complaint is cough which started on 12/4/2019.  Cough is productive FOR PAST DAY. We reviewed FOLFOX and side effects and purpose of treatment. Consent process performed and signed by patient.\par \par 12/24/19: \par Colon CA: Patient here for toxicity evaluation s/p FOLFOX C1 on 12/16/2019.  She tolerated infusion well and developed no anaphylaxis reactions.  Denies fever, chills, mouth sores, neuropathy, diarrhea or weight loss.  CBC with diff performed on 12/23/2019 was unremarkable.\par Broken Tooth: Patient noticed over the weekend her R molar broke off.  She was seen by her dentist who performed a filling however states patient will require a root canal.  \par Left gingiva swelling: Noted after her first treatment.  She was seen by her dentist who rx a local anesthetic with excellent relief.\par Fatigue: Mild fatigue 2-3 days after her first treatment however abates during her off week.  She is able to perform her routine/instrumental ADLs without restrictions.\par \par 1/15/2020: \par Colon CA: Today is C3D3 FOLFOX. Due to neutropenia during C2, 5-FU IVP was discontinued.  Patient has notice less fatigue afterwards.  She is doing well on treatment and reports no major adverse events.  She has intermittent cold induce neuropathy on days of treatment and 2-3 days afterwards however abates.  She denies fever, chills, mouth sores, diarrhea or mouth sores.\par Social: Patient in excellent spirits today.  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 She notices that the neuropathy is totally gone in between treatments. However she must avoid cold entirely. She has been very active in teaching and work. She has been singing regularly and will go to [employer] in a week to sing. She went to [place] 2 weeks ago for ~ 4 days to visit her ill mother who is suffering from dementia. Overall, she is managing the psychological component of her treatment pretty well; and had relied on strategies from Dr. Moscoso, and support from friends. She feels relatively weak since the surgery and chemotherapy and is asking if she can participate in pilates classes and mild/moderate strengthening exercises, to which I supported.  She does not some left low back/flank pain in the AM and is concerned about her kidney.\par \par 3/11/2020:\par 1) Colon CA: C6 FOLFOX given on 3/4/2020.  Today her main complaint is fatigue.\par 2) Fatigue: Started on D4 of her last treatment.  Up until that point, she was doing extremely well.  On day 4-6 patient felt increase in fatigue.  She states she was in bed all day sleeping and is now fully recovering.  She is able to perform her routine/instrumental ADLs without any restrictions.  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   Patient has noticed she is pushing her self too much with working full time, taking care of her child and going to gym.  We have decided to cut back on work and see how patient responds after C7 of FOLFOX.\par 3) Anxiety: She currently works in [place] and is worried about her exposure to COVID-19.  She has decided to take 2-3 weeks off from work.\par \par 4/1/2020:\par 1) Colon CA: Today is C8 FOLFOX.\par 2) Neuropathy: Patient notices neuropathy located on her bilateral fingertips on days of treatment especially if she's exposed to cold temperatures.   Generally speaking patient wears mittens and notices if she is active the neuropathy tends to improve.  She denies neuropathy on her off week and denies it today.  She is able to functions and use her hands without any restrictions.\par 3) Social She is currently not working due to COVID-19 pandemic.\par 4) Left flank pain: Patient is complaining of intermittent lower back/flank pain that generally happens at night time.  She denies fever, chills, dysuria, hematuria or lifting heavy object.  She is concerned its her kidneys however her renal functions are WNL and last scan on 11/2019 demonstrated no abnormality in the kidneys.\par \par 4/22/2020:\par 1) Colon CA: Patient is scheduled for C9 FOLFOX today.  Due to thrombocytopenia, treatment was delayed x one week.  She had a good week.  Patient was able to bike with her son outside and go for walks.  She is feeling well today.  Denies fever, chills, diarrhea, rash, HFS or fatigue.\par 2) Neuropathy: Very mild neuropathy noted on her fingertips which is triggered by cold temperature.  Denies neuropathy today.\par  [de-identified] : Surgeon: Yuval Haynes; [phone]\par PCP: Kimberly Melton; [phone]\par GI: Yuval Mendez; [phone]\par \par Patient Cell # [phone]\par \par  [de-identified] : adenocarcinoma

## 2020-04-27 NOTE — REVIEW OF SYSTEMS
[Anxiety] : anxiety [Negative] : Allergic/Immunologic [FreeTextEntry2] : mild fatigue but is able to push through. [FreeTextEntry9] : low back discomfort, no UTI symptoms. [de-identified] : hypothyroidism

## 2020-04-29 ENCOUNTER — APPOINTMENT (OUTPATIENT)
Dept: HEMATOLOGY ONCOLOGY | Facility: CLINIC | Age: 60
End: 2020-04-29

## 2020-05-05 ENCOUNTER — OUTPATIENT (OUTPATIENT)
Dept: OUTPATIENT SERVICES | Facility: HOSPITAL | Age: 60
LOS: 1 days | End: 2020-05-05
Payer: COMMERCIAL

## 2020-05-05 ENCOUNTER — RESULT REVIEW (OUTPATIENT)
Age: 60
End: 2020-05-05

## 2020-05-05 ENCOUNTER — APPOINTMENT (OUTPATIENT)
Dept: CT IMAGING | Facility: IMAGING CENTER | Age: 60
End: 2020-05-05
Payer: COMMERCIAL

## 2020-05-05 DIAGNOSIS — Z98.890 OTHER SPECIFIED POSTPROCEDURAL STATES: Chronic | ICD-10-CM

## 2020-05-05 DIAGNOSIS — C16.1 MALIGNANT NEOPLASM OF FUNDUS OF STOMACH: ICD-10-CM

## 2020-05-05 DIAGNOSIS — Z98.891 HISTORY OF UTERINE SCAR FROM PREVIOUS SURGERY: Chronic | ICD-10-CM

## 2020-05-05 PROCEDURE — 71260 CT THORAX DX C+: CPT | Mod: 26

## 2020-05-05 PROCEDURE — 74177 CT ABD & PELVIS W/CONTRAST: CPT

## 2020-05-05 PROCEDURE — 71260 CT THORAX DX C+: CPT

## 2020-05-05 PROCEDURE — 74177 CT ABD & PELVIS W/CONTRAST: CPT | Mod: 26

## 2020-05-06 ENCOUNTER — LABORATORY RESULT (OUTPATIENT)
Age: 60
End: 2020-05-06

## 2020-05-06 ENCOUNTER — RESULT REVIEW (OUTPATIENT)
Age: 60
End: 2020-05-06

## 2020-05-06 ENCOUNTER — APPOINTMENT (OUTPATIENT)
Dept: HEMATOLOGY ONCOLOGY | Facility: CLINIC | Age: 60
End: 2020-05-06
Payer: COMMERCIAL

## 2020-05-06 ENCOUNTER — APPOINTMENT (OUTPATIENT)
Dept: INFUSION THERAPY | Facility: HOSPITAL | Age: 60
End: 2020-05-06

## 2020-05-06 DIAGNOSIS — D69.6 THROMBOCYTOPENIA, UNSPECIFIED: ICD-10-CM

## 2020-05-06 LAB
BASOPHILS # BLD AUTO: 0.03 K/UL — SIGNIFICANT CHANGE UP (ref 0–0.2)
BASOPHILS NFR BLD AUTO: 0.7 % — SIGNIFICANT CHANGE UP (ref 0–2)
EOSINOPHIL # BLD AUTO: 0.05 K/UL — SIGNIFICANT CHANGE UP (ref 0–0.5)
EOSINOPHIL NFR BLD AUTO: 1.1 % — SIGNIFICANT CHANGE UP (ref 0–6)
HCT VFR BLD CALC: 35.7 % — SIGNIFICANT CHANGE UP (ref 34.5–45)
HGB BLD-MCNC: 11.4 G/DL — LOW (ref 11.5–15.5)
IMM GRANULOCYTES NFR BLD AUTO: 0.4 % — SIGNIFICANT CHANGE UP (ref 0–1.5)
LYMPHOCYTES # BLD AUTO: 0.93 K/UL — LOW (ref 1–3.3)
LYMPHOCYTES # BLD AUTO: 20.8 % — SIGNIFICANT CHANGE UP (ref 13–44)
MCHC RBC-ENTMCNC: 26.9 PG — LOW (ref 27–34)
MCHC RBC-ENTMCNC: 31.9 GM/DL — LOW (ref 32–36)
MCV RBC AUTO: 84.2 FL — SIGNIFICANT CHANGE UP (ref 80–100)
MONOCYTES # BLD AUTO: 0.6 K/UL — SIGNIFICANT CHANGE UP (ref 0–0.9)
MONOCYTES NFR BLD AUTO: 13.4 % — SIGNIFICANT CHANGE UP (ref 2–14)
NEUTROPHILS # BLD AUTO: 2.85 K/UL — SIGNIFICANT CHANGE UP (ref 1.8–7.4)
NEUTROPHILS NFR BLD AUTO: 63.6 % — SIGNIFICANT CHANGE UP (ref 43–77)
NRBC # BLD: 0 /100 WBCS — SIGNIFICANT CHANGE UP (ref 0–0)
PLATELET # BLD AUTO: 92 K/UL — LOW (ref 150–400)
RBC # BLD: 4.24 M/UL — SIGNIFICANT CHANGE UP (ref 3.8–5.2)
RBC # FLD: 18.6 % — HIGH (ref 10.3–14.5)
WBC # BLD: 4.48 K/UL — SIGNIFICANT CHANGE UP (ref 3.8–10.5)
WBC # FLD AUTO: 4.48 K/UL — SIGNIFICANT CHANGE UP (ref 3.8–10.5)

## 2020-05-06 PROCEDURE — 99214 OFFICE O/P EST MOD 30 MIN: CPT

## 2020-05-06 NOTE — REVIEW OF SYSTEMS
[Anxiety] : anxiety [Negative] : Heme/Lymph [FreeTextEntry2] : mild fatigue but is able to push through. [FreeTextEntry9] : low back discomfort, no UTI symptoms. [de-identified] : hypothyroidism

## 2020-05-06 NOTE — HISTORY OF PRESENT ILLNESS
[Disease: _____________________] : Disease: [unfilled] [T: ___] : T[unfilled] [N: ___] : N[unfilled] [AJCC Stage: ____] : AJCC Stage: [unfilled] [M: ___] : M[unfilled] [de-identified] : Ms. Rutherford is a 58 year old female with past medical history of Graves disease presenting to the office for an initial consultation of colon CA.\par \par Patient c/o right LQ pain with occasional bloating, denies change in bowel habits for several months. Had a colonoscopy in 2012 that is self-reported as normal. 2017 pelvic sono was negative. \par \par She was seen by GI in late 10/2019.  CAT abdomen with contrast ordered which revealed cecal mass with enlarged LNs.  F/U colonoscopy revealed sessile SC polyp which was biopsied (hyperplastic) and a malignant appearing mass involving IC valve/cecum.\par Pathology: adenocarcinoma\par \par Patient underwent right hemicolectomy on 11/11/2019 with Dr. 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  CAT chest, abdomen and pelvis performed on 12/30/2019 demonstrated no evidence of metastatic disease.  Mediport placed on 12/3/2019.  Today her main complaint is cough which started on 12/4/2019.  Cough is productive FOR PAST DAY. We reviewed FOLFOX and side effects and purpose of treatment. Consent process performed and signed by patient.\par \par 12/24/19: \par Colon CA: Patient here for toxicity evaluation s/p FOLFOX C1 on 12/16/2019.  She tolerated infusion well and developed no anaphylaxis reactions.  Denies fever, chills, mouth sores, neuropathy, diarrhea or weight loss.  CBC with diff performed on 12/23/2019 was unremarkable.\par Broken Tooth: Patient noticed over the weekend her R molar broke off.  She was seen by her dentist who performed a filling however states patient will require a root canal.  \par Left gingiva swelling: Noted after her first treatment.  She was seen by her dentist who rx a local anesthetic with excellent relief.\par Fatigue: Mild fatigue 2-3 days after her first treatment however abates during her off week.  She is able to perform her routine/instrumental ADLs without restrictions.\par \par 1/15/2020: \par Colon CA: Today is C3D3 FOLFOX. Due to neutropenia during C2, 5-FU IVP was discontinued.  Patient has notice less fatigue afterwards.  She is doing well on treatment and reports no major adverse events.  She has intermittent cold induce neuropathy on days of treatment and 2-3 days afterwards however abates.  She denies fever, chills, mouth sores, diarrhea or mouth sores.\par Social: Patient in excellent spirits today.  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 She notices that the neuropathy is totally gone in between treatments. However she must avoid cold entirely. She has been very active in teaching and work. She has been singing regularly and will go to [employer] in a week to sing. She went to [place] 2 weeks ago for ~ 4 days to visit her ill mother who is suffering from dementia. Overall, she is managing the psychological component of her treatment pretty well; and had relied on strategies from Dr. Moscoso, and support from friends. She feels relatively weak since the surgery and chemotherapy and is asking if she can participate in pilates classes and mild/moderate strengthening exercises, to which I supported.  She does not some left low back/flank pain in the AM and is concerned about her kidney.\par \par 3/11/2020:\par 1) Colon CA: C6 FOLFOX given on 3/4/2020.  Today her main complaint is fatigue.\par 2) Fatigue: Started on D4 of her last treatment.  Up until that point, she was doing extremely well.  On day 4-6 patient felt increase in fatigue.  She states she was in bed all day sleeping and is now fully recovering.  She is able to perform her routine/instrumental ADLs without any restrictions.  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   Patient has noticed she is pushing her self too much with working full time, taking care of her child and going to gym.  We have decided to cut back on work and see how patient responds after C7 of FOLFOX.\par 3) Anxiety: She currently works in [place] and is worried about her exposure to COVID-19.  She has decided to take 2-3 weeks off from work.\par \par 4/1/2020:\par 1) Colon CA: Today is C8 FOLFOX.\par 2) Neuropathy: Patient notices neuropathy located on her bilateral fingertips on days of treatment especially if she's exposed to cold temperatures.   Generally speaking patient wears mittens and notices if she is active the neuropathy tends to improve.  She denies neuropathy on her off week and denies it today.  She is able to functions and use her hands without any restrictions.\par 3) Social She is currently not working due to COVID-19 pandemic.\par 4) Left flank pain: Patient is complaining of intermittent lower back/flank pain that generally happens at night time.  She denies fever, chills, dysuria, hematuria or lifting heavy object.  She is concerned its her kidneys however her renal functions are WNL and last scan on 11/2019 demonstrated no abnormality in the kidneys.\par \par 5/6/2020:\par 1) Colon CA: Patient was scheduled for C10 FOLFOX today however due to thrombocytopenia, will delay treatment x one week.  She is feeling well today and denies fever, chills, diarrhea, rash, HFS or fatigue.  She underwent CAT chest, abdomen and pelvis on 5/5/2020 which demonstrated no recurrent or metastatic disease. \par 2) Neuropathy: Very mild neuropathy noted on her fingertips which is triggered by cold temperature.  Patient has noticed the neuropathy lasting on her off week.  It is intermittent and not too bothersome for patient.  She is able to use her hands without any restrictions.\par  [de-identified] : adenocarcinoma [de-identified] : Surgeon: Yuval Haynes; [phone]\par PCP: Kimberly Melton; [phone]\par GI: Yuval Mendez; [phone]\par \par Patient Cell # [phone]\par \par

## 2020-05-09 ENCOUNTER — APPOINTMENT (OUTPATIENT)
Dept: INFUSION THERAPY | Facility: HOSPITAL | Age: 60
End: 2020-05-09

## 2020-05-13 ENCOUNTER — LABORATORY RESULT (OUTPATIENT)
Age: 60
End: 2020-05-13

## 2020-05-13 ENCOUNTER — APPOINTMENT (OUTPATIENT)
Dept: HEMATOLOGY ONCOLOGY | Facility: CLINIC | Age: 60
End: 2020-05-13
Payer: COMMERCIAL

## 2020-05-13 ENCOUNTER — RESULT REVIEW (OUTPATIENT)
Age: 60
End: 2020-05-13

## 2020-05-13 ENCOUNTER — APPOINTMENT (OUTPATIENT)
Dept: INFUSION THERAPY | Facility: HOSPITAL | Age: 60
End: 2020-05-13

## 2020-05-13 LAB
BASOPHILS # BLD AUTO: 0.03 K/UL — SIGNIFICANT CHANGE UP (ref 0–0.2)
BASOPHILS NFR BLD AUTO: 1 % — SIGNIFICANT CHANGE UP (ref 0–2)
EOSINOPHIL # BLD AUTO: 0 K/UL — SIGNIFICANT CHANGE UP (ref 0–0.5)
EOSINOPHIL NFR BLD AUTO: 0 % — SIGNIFICANT CHANGE UP (ref 0–6)
HCT VFR BLD CALC: 33.8 % — LOW (ref 34.5–45)
HGB BLD-MCNC: 11.1 G/DL — LOW (ref 11.5–15.5)
LYMPHOCYTES # BLD AUTO: 0.69 K/UL — LOW (ref 1–3.3)
LYMPHOCYTES # BLD AUTO: 23 % — SIGNIFICANT CHANGE UP (ref 13–44)
MCHC RBC-ENTMCNC: 27.3 PG — SIGNIFICANT CHANGE UP (ref 27–34)
MCHC RBC-ENTMCNC: 32.8 GM/DL — SIGNIFICANT CHANGE UP (ref 32–36)
MCV RBC AUTO: 83 FL — SIGNIFICANT CHANGE UP (ref 80–100)
MONOCYTES # BLD AUTO: 0.72 K/UL — SIGNIFICANT CHANGE UP (ref 0–0.9)
MONOCYTES NFR BLD AUTO: 24 % — HIGH (ref 2–14)
NEUTROPHILS # BLD AUTO: 1.57 K/UL — LOW (ref 1.8–7.4)
NEUTROPHILS NFR BLD AUTO: 52 % — SIGNIFICANT CHANGE UP (ref 43–77)
NRBC # BLD: 0 /100 — SIGNIFICANT CHANGE UP (ref 0–0)
NRBC # BLD: SIGNIFICANT CHANGE UP /100 WBCS (ref 0–0)
PLAT MORPH BLD: NORMAL — SIGNIFICANT CHANGE UP
PLATELET # BLD AUTO: 91 K/UL — LOW (ref 150–400)
RBC # BLD: 4.07 M/UL — SIGNIFICANT CHANGE UP (ref 3.8–5.2)
RBC # FLD: 17.9 % — HIGH (ref 10.3–14.5)
RBC BLD AUTO: SIGNIFICANT CHANGE UP
WBC # BLD: 3.01 K/UL — LOW (ref 3.8–10.5)
WBC # FLD AUTO: 3.01 K/UL — LOW (ref 3.8–10.5)

## 2020-05-13 PROCEDURE — 99441: CPT

## 2020-05-14 DIAGNOSIS — R11.2 NAUSEA WITH VOMITING, UNSPECIFIED: ICD-10-CM

## 2020-05-14 DIAGNOSIS — Z51.11 ENCOUNTER FOR ANTINEOPLASTIC CHEMOTHERAPY: ICD-10-CM

## 2020-05-16 ENCOUNTER — APPOINTMENT (OUTPATIENT)
Dept: INFUSION THERAPY | Facility: HOSPITAL | Age: 60
End: 2020-05-16

## 2020-05-21 ENCOUNTER — OUTPATIENT (OUTPATIENT)
Dept: OUTPATIENT SERVICES | Facility: HOSPITAL | Age: 60
LOS: 1 days | Discharge: HOME | End: 2020-05-21

## 2020-05-21 DIAGNOSIS — Z98.891 HISTORY OF UTERINE SCAR FROM PREVIOUS SURGERY: Chronic | ICD-10-CM

## 2020-05-21 DIAGNOSIS — C18.9 MALIGNANT NEOPLASM OF COLON, UNSPECIFIED: ICD-10-CM

## 2020-05-21 DIAGNOSIS — Z98.890 OTHER SPECIFIED POSTPROCEDURAL STATES: Chronic | ICD-10-CM

## 2020-05-27 ENCOUNTER — LABORATORY RESULT (OUTPATIENT)
Age: 60
End: 2020-05-27

## 2020-05-27 ENCOUNTER — APPOINTMENT (OUTPATIENT)
Dept: HEMATOLOGY ONCOLOGY | Facility: CLINIC | Age: 60
End: 2020-05-27
Payer: COMMERCIAL

## 2020-05-27 ENCOUNTER — RESULT REVIEW (OUTPATIENT)
Age: 60
End: 2020-05-27

## 2020-05-27 ENCOUNTER — APPOINTMENT (OUTPATIENT)
Dept: INFUSION THERAPY | Facility: HOSPITAL | Age: 60
End: 2020-05-27

## 2020-05-27 LAB
BASOPHILS # BLD AUTO: 0.03 K/UL — SIGNIFICANT CHANGE UP (ref 0–0.2)
BASOPHILS NFR BLD AUTO: 0.6 % — SIGNIFICANT CHANGE UP (ref 0–2)
EOSINOPHIL # BLD AUTO: 0.05 K/UL — SIGNIFICANT CHANGE UP (ref 0–0.5)
EOSINOPHIL NFR BLD AUTO: 1 % — SIGNIFICANT CHANGE UP (ref 0–6)
HCT VFR BLD CALC: 37.4 % — SIGNIFICANT CHANGE UP (ref 34.5–45)
HGB BLD-MCNC: 11.9 G/DL — SIGNIFICANT CHANGE UP (ref 11.5–15.5)
IMM GRANULOCYTES NFR BLD AUTO: 1 % — SIGNIFICANT CHANGE UP (ref 0–1.5)
LYMPHOCYTES # BLD AUTO: 1.27 K/UL — SIGNIFICANT CHANGE UP (ref 1–3.3)
LYMPHOCYTES # BLD AUTO: 26.2 % — SIGNIFICANT CHANGE UP (ref 13–44)
MCHC RBC-ENTMCNC: 27.5 PG — SIGNIFICANT CHANGE UP (ref 27–34)
MCHC RBC-ENTMCNC: 31.8 GM/DL — LOW (ref 32–36)
MCV RBC AUTO: 86.4 FL — SIGNIFICANT CHANGE UP (ref 80–100)
MONOCYTES # BLD AUTO: 0.45 K/UL — SIGNIFICANT CHANGE UP (ref 0–0.9)
MONOCYTES NFR BLD AUTO: 9.3 % — SIGNIFICANT CHANGE UP (ref 2–14)
NEUTROPHILS # BLD AUTO: 2.99 K/UL — SIGNIFICANT CHANGE UP (ref 1.8–7.4)
NEUTROPHILS NFR BLD AUTO: 61.9 % — SIGNIFICANT CHANGE UP (ref 43–77)
NRBC # BLD: 0 /100 WBCS — SIGNIFICANT CHANGE UP (ref 0–0)
PLATELET # BLD AUTO: 106 K/UL — LOW (ref 150–400)
RBC # BLD: 4.33 M/UL — SIGNIFICANT CHANGE UP (ref 3.8–5.2)
RBC # FLD: 18 % — HIGH (ref 10.3–14.5)
WBC # BLD: 4.84 K/UL — SIGNIFICANT CHANGE UP (ref 3.8–10.5)
WBC # FLD AUTO: 4.84 K/UL — SIGNIFICANT CHANGE UP (ref 3.8–10.5)

## 2020-05-27 PROCEDURE — 99213 OFFICE O/P EST LOW 20 MIN: CPT

## 2020-05-28 DIAGNOSIS — Z51.11 ENCOUNTER FOR ANTINEOPLASTIC CHEMOTHERAPY: ICD-10-CM

## 2020-05-28 DIAGNOSIS — R11.2 NAUSEA WITH VOMITING, UNSPECIFIED: ICD-10-CM

## 2020-05-29 ENCOUNTER — APPOINTMENT (OUTPATIENT)
Dept: INFUSION THERAPY | Facility: HOSPITAL | Age: 60
End: 2020-05-29

## 2020-05-30 ENCOUNTER — APPOINTMENT (OUTPATIENT)
Dept: INFUSION THERAPY | Facility: HOSPITAL | Age: 60
End: 2020-05-30

## 2020-06-08 DIAGNOSIS — Z01.818 ENCOUNTER FOR OTHER PREPROCEDURAL EXAMINATION: ICD-10-CM

## 2020-06-09 ENCOUNTER — APPOINTMENT (OUTPATIENT)
Dept: DISASTER EMERGENCY | Facility: CLINIC | Age: 60
End: 2020-06-09

## 2020-06-10 ENCOUNTER — LABORATORY RESULT (OUTPATIENT)
Age: 60
End: 2020-06-10

## 2020-06-10 ENCOUNTER — RESULT REVIEW (OUTPATIENT)
Age: 60
End: 2020-06-10

## 2020-06-10 ENCOUNTER — APPOINTMENT (OUTPATIENT)
Dept: HEMATOLOGY ONCOLOGY | Facility: CLINIC | Age: 60
End: 2020-06-10
Payer: COMMERCIAL

## 2020-06-10 ENCOUNTER — APPOINTMENT (OUTPATIENT)
Dept: INFUSION THERAPY | Facility: HOSPITAL | Age: 60
End: 2020-06-10

## 2020-06-10 LAB
BASOPHILS # BLD AUTO: 0.02 K/UL — SIGNIFICANT CHANGE UP (ref 0–0.2)
BASOPHILS NFR BLD AUTO: 0.4 % — SIGNIFICANT CHANGE UP (ref 0–2)
EOSINOPHIL # BLD AUTO: 0.07 K/UL — SIGNIFICANT CHANGE UP (ref 0–0.5)
EOSINOPHIL NFR BLD AUTO: 1.4 % — SIGNIFICANT CHANGE UP (ref 0–6)
HCT VFR BLD CALC: 36 % — SIGNIFICANT CHANGE UP (ref 34.5–45)
HGB BLD-MCNC: 11.7 G/DL — SIGNIFICANT CHANGE UP (ref 11.5–15.5)
IMM GRANULOCYTES NFR BLD AUTO: 0.2 % — SIGNIFICANT CHANGE UP (ref 0–1.5)
LYMPHOCYTES # BLD AUTO: 1.16 K/UL — SIGNIFICANT CHANGE UP (ref 1–3.3)
LYMPHOCYTES # BLD AUTO: 23.4 % — SIGNIFICANT CHANGE UP (ref 13–44)
MCHC RBC-ENTMCNC: 27.6 PG — SIGNIFICANT CHANGE UP (ref 27–34)
MCHC RBC-ENTMCNC: 32.5 GM/DL — SIGNIFICANT CHANGE UP (ref 32–36)
MCV RBC AUTO: 84.9 FL — SIGNIFICANT CHANGE UP (ref 80–100)
MONOCYTES # BLD AUTO: 0.43 K/UL — SIGNIFICANT CHANGE UP (ref 0–0.9)
MONOCYTES NFR BLD AUTO: 8.7 % — SIGNIFICANT CHANGE UP (ref 2–14)
NEUTROPHILS # BLD AUTO: 3.26 K/UL — SIGNIFICANT CHANGE UP (ref 1.8–7.4)
NEUTROPHILS NFR BLD AUTO: 65.9 % — SIGNIFICANT CHANGE UP (ref 43–77)
NRBC # BLD: 0 /100 WBCS — SIGNIFICANT CHANGE UP (ref 0–0)
PLATELET # BLD AUTO: 111 K/UL — LOW (ref 150–400)
RBC # BLD: 4.24 M/UL — SIGNIFICANT CHANGE UP (ref 3.8–5.2)
RBC # FLD: 17.6 % — HIGH (ref 10.3–14.5)
SARS-COV-2 N GENE NPH QL NAA+PROBE: NOT DETECTED
WBC # BLD: 4.95 K/UL — SIGNIFICANT CHANGE UP (ref 3.8–10.5)
WBC # FLD AUTO: 4.95 K/UL — SIGNIFICANT CHANGE UP (ref 3.8–10.5)

## 2020-06-10 PROCEDURE — 99214 OFFICE O/P EST MOD 30 MIN: CPT

## 2020-06-10 RX ORDER — LEVOTHYROXINE SODIUM 125 MCG
1 TABLET ORAL
Qty: 0 | Refills: 0 | DISCHARGE

## 2020-06-10 NOTE — HISTORY OF PRESENT ILLNESS
[Disease: _____________________] : Disease: [unfilled] [T: ___] : T[unfilled] [N: ___] : N[unfilled] [M: ___] : M[unfilled] [AJCC Stage: ____] : AJCC Stage: [unfilled] [de-identified] : adenocarcinoma [de-identified] : Ms. Rutherford is a 58 year old female with past medical history of Graves disease presenting to the office for an initial consultation of colon CA.\par \par Patient c/o right LQ pain with occasional bloating, denies change in bowel habits for several months. Had a colonoscopy in 2012 that is self-reported as normal. 2017 pelvic sono was negative. \par \par She was seen by GI in late 10/2019.  CAT abdomen with contrast ordered which revealed cecal mass with enlarged LNs.  F/U colonoscopy revealed sessile SC polyp which was biopsied (hyperplastic) and a malignant appearing mass involving IC valve/cecum.\par Pathology: adenocarcinoma\par \par Patient underwent right hemicolectomy on 11/11/2019 with Dr. 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  CAT chest, abdomen and pelvis performed on 12/30/2019 demonstrated no evidence of metastatic disease.  Mediport placed on 12/3/2019.  Today her main complaint is cough which started on 12/4/2019.  Cough is productive FOR PAST DAY. We reviewed FOLFOX and side effects and purpose of treatment. Consent process performed and signed by patient.\par \par 12/24/19: \par Colon CA: Patient here for toxicity evaluation s/p FOLFOX C1 on 12/16/2019.  She tolerated infusion well and developed no anaphylaxis reactions.  Denies fever, chills, mouth sores, neuropathy, diarrhea or weight loss.  CBC with diff performed on 12/23/2019 was unremarkable.\par Broken Tooth: Patient noticed over the weekend her R molar broke off.  She was seen by her dentist who performed a filling however states patient will require a root canal.  \par Left gingiva swelling: Noted after her first treatment.  She was seen by her dentist who rx a local anesthetic with excellent relief.\par Fatigue: Mild fatigue 2-3 days after her first treatment however abates during her off week.  She is able to perform her routine/instrumental ADLs without restrictions.\par \par 1/15/2020: \par Colon CA: Today is C3D3 FOLFOX. Due to neutropenia during C2, 5-FU IVP was discontinued.  Patient has notice less fatigue afterwards.  She is doing well on treatment and reports no major adverse events.  She has intermittent cold induce neuropathy on days of treatment and 2-3 days afterwards however abates.  She denies fever, chills, mouth sores, diarrhea or mouth sores.\par Social: Patient in excellent spirits today.  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 She notices that the neuropathy is totally gone in between treatments. However she must avoid cold entirely. She has been very active in teaching and work. She has been singing regularly and will go to [place] in a week to sing. She went to [place] 2 weeks ago for ~ 4 days to visit her ill mother who is suffering from dementia. Overall, she is managing the psychological component of her treatment pretty well; and had relied on strategies from Dr. Moscoso, and support from friends. She feels relatively weak since the surgery and chemotherapy and is asking if she can participate in pilates classes and mild/moderate strengthening exercises, to which I supported.  She does not some left low back/flank pain in the AM and is concerned about her kidney.\par \par 3/11/2020:\par 1) Colon CA: C6 FOLFOX given on 3/4/2020.  Today her main complaint is fatigue.\par 2) Fatigue: Started on D4 of her last treatment.  Up until that point, she was doing extremely well.  On day 4-6 patient felt increase in fatigue.  She states she was in bed all day sleeping and is now fully recovering.  She is able to perform her routine/instrumental ADLs without any restrictions.  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   Patient has noticed she is pushing her self too much with working full time, taking care of her child and going to gym.  We have decided to cut back on work and see how patient responds after C7 of FOLFOX.\par 3) Anxiety: She currently works in [place] and is worried about her exposure to COVID-19.  She has decided to take 2-3 weeks off from work.\par \par 4/1/2020:\par 1) Colon CA: Today is C8 FOLFOX.\par 2) Neuropathy: Patient notices neuropathy located on her bilateral fingertips on days of treatment especially if she's exposed to cold temperatures.   Generally speaking patient wears mittens and notices if she is active the neuropathy tends to improve.  She denies neuropathy on her off week and denies it today.  She is able to functions and use her hands without any restrictions.\par 3) Social She is currently not working due to COVID-19 pandemic.\par 4) Left flank pain: Patient is complaining of intermittent lower back/flank pain that generally happens at night time.  She denies fever, chills, dysuria, hematuria or lifting heavy object.  She is concerned its her kidneys however her renal functions are WNL and last scan on 11/2019 demonstrated no abnormality in the kidneys.\par \par 5/6/2020:\par 1) Colon CA: Patient was scheduled for C10 FOLFOX today however due to thrombocytopenia, will delay treatment x one week.  She is feeling well today and denies fever, chills, diarrhea, rash, HFS or fatigue.  She underwent CAT chest, abdomen and pelvis on 5/5/2020 which demonstrated no recurrent or metastatic disease. \par 2) Neuropathy: Very mild neuropathy noted on her fingertips which is triggered by cold temperature.  Patient has noticed the neuropathy lasting on her off week.  It is intermittent and not too bothersome for patient.  She is able to use her hands without any restrictions.\par \par 6/10/2020: \par 1) Colon CA: Patient is here for C12 5-FU+Leucovorin.  She is tolerating treatment well and reports no significant adverse events.\par 2) Neuropathy: She is complaining of increasing neuropathy located on her fingertips and toes.States "numbness and tingling" last into her off week now. Oxaliplatin was discontinued and patient was educated that neuropathy can potentially progress after treatment is completed up to 6 months.\par \par \par  [de-identified] : Surgeon: Yuval Haynes; [phone]\par PCP: Kimberly Melton; [phone]\par GI: Yuval Mendez; [phone]\par \par Patient Cell # [phone]\par \par

## 2020-06-10 NOTE — REVIEW OF SYSTEMS
[Anxiety] : anxiety [Negative] : Allergic/Immunologic [de-identified] : hypothyroidism [FreeTextEntry2] : mild fatigue but is able to push through. [FreeTextEntry9] : low back discomfort, no UTI symptoms.

## 2020-06-12 ENCOUNTER — APPOINTMENT (OUTPATIENT)
Dept: INFUSION THERAPY | Facility: HOSPITAL | Age: 60
End: 2020-06-12

## 2020-06-25 ENCOUNTER — APPOINTMENT (OUTPATIENT)
Dept: OPHTHALMOLOGY | Facility: CLINIC | Age: 60
End: 2020-06-25
Payer: COMMERCIAL

## 2020-06-25 ENCOUNTER — NON-APPOINTMENT (OUTPATIENT)
Age: 60
End: 2020-06-25

## 2020-06-25 PROCEDURE — 92004 COMPRE OPH EXAM NEW PT 1/>: CPT

## 2020-07-08 ENCOUNTER — OUTPATIENT (OUTPATIENT)
Dept: OUTPATIENT SERVICES | Facility: HOSPITAL | Age: 60
LOS: 1 days | Discharge: HOME | End: 2020-07-08

## 2020-07-08 DIAGNOSIS — C18.9 MALIGNANT NEOPLASM OF COLON, UNSPECIFIED: ICD-10-CM

## 2020-07-08 DIAGNOSIS — Z98.890 OTHER SPECIFIED POSTPROCEDURAL STATES: Chronic | ICD-10-CM

## 2020-07-08 DIAGNOSIS — Z98.891 HISTORY OF UTERINE SCAR FROM PREVIOUS SURGERY: Chronic | ICD-10-CM

## 2020-07-10 ENCOUNTER — RESULT REVIEW (OUTPATIENT)
Age: 60
End: 2020-07-10

## 2020-07-10 ENCOUNTER — LABORATORY RESULT (OUTPATIENT)
Age: 60
End: 2020-07-10

## 2020-07-10 ENCOUNTER — APPOINTMENT (OUTPATIENT)
Dept: INFUSION THERAPY | Facility: HOSPITAL | Age: 60
End: 2020-07-10

## 2020-07-10 LAB
BASOPHILS # BLD AUTO: 0.03 K/UL — SIGNIFICANT CHANGE UP (ref 0–0.2)
BASOPHILS NFR BLD AUTO: 0.8 % — SIGNIFICANT CHANGE UP (ref 0–2)
EOSINOPHIL # BLD AUTO: 0.09 K/UL — SIGNIFICANT CHANGE UP (ref 0–0.5)
EOSINOPHIL NFR BLD AUTO: 2.4 % — SIGNIFICANT CHANGE UP (ref 0–6)
HCT VFR BLD CALC: 37 % — SIGNIFICANT CHANGE UP (ref 34.5–45)
HGB BLD-MCNC: 12 G/DL — SIGNIFICANT CHANGE UP (ref 11.5–15.5)
IMM GRANULOCYTES NFR BLD AUTO: 0.3 % — SIGNIFICANT CHANGE UP (ref 0–1.5)
LYMPHOCYTES # BLD AUTO: 1.18 K/UL — SIGNIFICANT CHANGE UP (ref 1–3.3)
LYMPHOCYTES # BLD AUTO: 31 % — SIGNIFICANT CHANGE UP (ref 13–44)
MCHC RBC-ENTMCNC: 28.2 PG — SIGNIFICANT CHANGE UP (ref 27–34)
MCHC RBC-ENTMCNC: 32.4 GM/DL — SIGNIFICANT CHANGE UP (ref 32–36)
MCV RBC AUTO: 86.9 FL — SIGNIFICANT CHANGE UP (ref 80–100)
MONOCYTES # BLD AUTO: 0.37 K/UL — SIGNIFICANT CHANGE UP (ref 0–0.9)
MONOCYTES NFR BLD AUTO: 9.7 % — SIGNIFICANT CHANGE UP (ref 2–14)
NEUTROPHILS # BLD AUTO: 2.13 K/UL — SIGNIFICANT CHANGE UP (ref 1.8–7.4)
NEUTROPHILS NFR BLD AUTO: 55.8 % — SIGNIFICANT CHANGE UP (ref 43–77)
NRBC # BLD: 0 /100 WBCS — SIGNIFICANT CHANGE UP (ref 0–0)
PLATELET # BLD AUTO: 134 K/UL — LOW (ref 150–400)
RBC # BLD: 4.26 M/UL — SIGNIFICANT CHANGE UP (ref 3.8–5.2)
RBC # FLD: 16.3 % — HIGH (ref 10.3–14.5)
WBC # BLD: 3.81 K/UL — SIGNIFICANT CHANGE UP (ref 3.8–10.5)
WBC # FLD AUTO: 3.81 K/UL — SIGNIFICANT CHANGE UP (ref 3.8–10.5)

## 2020-07-10 NOTE — DISCUSSION/SUMMARY
[Cancer Type / Location / Histology Subtype: ________] : Cancer Type / Location / Histology Subtype: [unfilled] [Diagnosis Date (year): ____] : Diagnosis Date (year): [unfilled] [III] : III [Surgery] : Surgery: Yes [Surgery Date(s) (year): ____] : Surgery Date(s) (year): [unfilled] [Surgical Procedure / Location / Findings: _________] : Surgical Procedure / Location / Findings: [unfilled] [Systemic Therapy (chemotherapy, hormonal therapy, other)] : Systemic Therapy (chemotherapy, hormonal therapy, other): Yes [Persistent symptoms or side effects at completion of treatment?  If Yes, list types ___] : Persistent symptoms or side effects at completion of treatment? Yes, [unfilled] [Genetic / hereditary risk factor(s) or predisposing conditions: __________] : Genetic / hereditary risk factor(s) or predisposing conditions: [unfilled] [Primary care physician] : primary care physician [Colonoscopy] : colonoscopy [Mammogram] : Mammogram [Skin Checks] : skin checks [PAP Test] : PAP test [Bone Density Test] : bone density test [Cholesterol Test] : cholesterol test [Annual Flu Shot] : annual flu shot [Anxiety and Depression] : anxiety and depression [Cognitive Function] : cognitive function [Sexual Function] : sexual function [Sleep Disorders] : sleep disorders [Emotional and mental health] : Emotional and mental health [Memory or Concentration Loss] : Memory or concentration loss [Fatigue] : Fatigue [Alcohol use] : Alcohol use [Weigh Management (loss / gain)] : Weight management (loss / gain) [Diet] : Diet [Sun screen use] : Sun screen use [Physical activity] : Physical activity [Path to Wellness Survivorship Program] : Path to Wellness Survivorship Program [Psycho-social Emotional Support ([place])] : Psycho-social Emotional Support  (please call SW at [phone]) [Nutritional and Wellness Counseling ([place])] : Nutritional and Wellness Counseling (please call Nutrition office at [phone]) [Cancer Care] : Cancer Care [American Cancer Society] : the American Cancer Society [Radiation] : Radiation: No [FreeTextEntry1] : FOLFOX regimen (leucovorin,5 fluorouracil, oxaliplatin) every 2 weeks x 12 cycles\par completed 6/2020 with oxaliplatin eliminated after cycle 9 due to neuropathy [Genetic Counseling] : Genetic Counseling: No

## 2020-07-28 ENCOUNTER — APPOINTMENT (OUTPATIENT)
Dept: HEMATOLOGY ONCOLOGY | Facility: CLINIC | Age: 60
End: 2020-07-28
Payer: COMMERCIAL

## 2020-07-28 VITALS
TEMPERATURE: 97.8 F | WEIGHT: 124.34 LBS | DIASTOLIC BLOOD PRESSURE: 68 MMHG | BODY MASS INDEX: 18.91 KG/M2 | SYSTOLIC BLOOD PRESSURE: 103 MMHG | OXYGEN SATURATION: 100 % | RESPIRATION RATE: 18 BRPM | HEART RATE: 56 BPM

## 2020-07-28 PROCEDURE — 99214 OFFICE O/P EST MOD 30 MIN: CPT

## 2020-07-28 NOTE — CONSULT LETTER
[Dear  ___] : Dear  [unfilled], [Please see my note below.] : Please see my note below. [Consult Closing:] : Thank you very much for allowing me to participate in the care of this patient.  If you have any questions, please do not hesitate to contact me. [Sincerely,] : Sincerely, [DrVadim  ___] : Dr. ALVARADO [Courtesy Letter:] : I had the pleasure of seeing your patient, [unfilled], in my office today. [DrVadim ___] : Dr. ALVARADO

## 2020-07-28 NOTE — REVIEW OF SYSTEMS
[Anxiety] : anxiety [Negative] : Allergic/Immunologic [FreeTextEntry2] : mild fatigue but is able to push through. [FreeTextEntry9] : low back discomfort, no UTI symptoms. [de-identified] : hypothyroidism

## 2020-07-28 NOTE — HISTORY OF PRESENT ILLNESS
[Disease: _____________________] : Disease: [unfilled] [T: ___] : T[unfilled] [N: ___] : N[unfilled] [M: ___] : M[unfilled] [AJCC Stage: ____] : AJCC Stage: [unfilled] [de-identified] : adenocarcinoma [de-identified] : Ms. Rutherford is a 58 year old female with past medical history of Graves disease presenting to the office for an initial consultation of colon CA.\par \par Patient c/o right LQ pain with occasional bloating, denies change in bowel habits for several months. Had a colonoscopy in 2012 that is self-reported as normal. 2017 pelvic sono was negative. \par \par She was seen by GI in late 10/2019.  CAT abdomen with contrast ordered which revealed cecal mass with enlarged LNs.  F/U colonoscopy revealed sessile SC polyp which was biopsied (hyperplastic) and a malignant appearing mass involving IC valve/cecum.\par Pathology: adenocarcinoma\par \par Patient underwent right hemicolectomy on 11/11/2019 with Dr. 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  CAT chest, abdomen and pelvis performed on 12/30/2019 demonstrated no evidence of metastatic disease.  Mediport placed on 12/3/2019.  Today her main complaint is cough which started on 12/4/2019.  Cough is productive FOR PAST DAY. We reviewed FOLFOX and side effects and purpose of treatment. Consent process performed and signed by patient.\par \par 12/24/19: \par Colon CA: Patient here for toxicity evaluation s/p FOLFOX C1 on 12/16/2019.  She tolerated infusion well and developed no anaphylaxis reactions.  Denies fever, chills, mouth sores, neuropathy, diarrhea or weight loss.  CBC with diff performed on 12/23/2019 was unremarkable.\par Broken Tooth: Patient noticed over the weekend her R molar broke off.  She was seen by her dentist who performed a filling however states patient will require a root canal.  \par Left gingiva swelling: Noted after her first treatment.  She was seen by her dentist who rx a local anesthetic with excellent relief.\par Fatigue: Mild fatigue 2-3 days after her first treatment however abates during her off week.  She is able to perform her routine/instrumental ADLs without restrictions.\par \par 1/15/2020: \par Colon CA: Today is C3D3 FOLFOX. Due to neutropenia during C2, 5-FU IVP was discontinued.  Patient has notice less fatigue afterwards.  She is doing well on treatment and reports no major adverse events.  She has intermittent cold induce neuropathy on days of treatment and 2-3 days afterwards however abates.  She denies fever, chills, mouth sores, diarrhea or mouth sores.\par Social: Patient in excellent spirits today.  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 She notices that the neuropathy is totally gone in between treatments. However she must avoid cold entirely. She has been very active in teaching and work. She has been singing regularly and will go to [employer] in a week to sing. She went to [place] 2 weeks ago for ~ 4 days to visit her ill mother who is suffering from dementia. Overall, she is managing the psychological component of her treatment pretty well; and had relied on strategies from Dr. Moscoso, and support from friends. She feels relatively weak since the surgery and chemotherapy and is asking if she can participate in pilates classes and mild/moderate strengthening exercises, to which I supported.  She does not some left low back/flank pain in the AM and is concerned about her kidney.\par \par 3/11/2020:\par 1) Colon CA: C6 FOLFOX given on 3/4/2020.  Today her main complaint is fatigue.\par 2) Fatigue: Started on D4 of her last treatment.  Up until that point, she was doing extremely well.  On day 4-6 patient felt increase in fatigue.  She states she was in bed all day sleeping and is now fully recovering.  She is able to perform her routine/instrumental ADLs without any restrictions.  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   Patient has noticed she is pushing her self too much with working full time, taking care of her child and going to gym.  We have decided to cut back on work and see how patient responds after C7 of FOLFOX.\par 3) Anxiety: She currently works in [place] and is worried about her exposure to COVID-19.  She has decided to take 2-3 weeks off from work.\par \par 4/1/2020:\par 1) Colon CA: Today is C8 FOLFOX.\par 2) Neuropathy: Patient notices neuropathy located on her bilateral fingertips on days of treatment especially if she's exposed to cold temperatures.   Generally speaking patient wears mittens and notices if she is active the neuropathy tends to improve.  She denies neuropathy on her off week and denies it today.  She is able to functions and use her hands without any restrictions.\par 3) Social She is currently not working due to COVID-19 pandemic.\par 4) Left flank pain: Patient is complaining of intermittent lower back/flank pain that generally happens at night time.  She denies fever, chills, dysuria, hematuria or lifting heavy object.  She is concerned its her kidneys however her renal functions are WNL and last scan on 11/2019 demonstrated no abnormality in the kidneys.\par \par 5/6/2020:\par 1) Colon CA: Patient was scheduled for C10 FOLFOX today however due to thrombocytopenia, will delay treatment x one week.  She is feeling well today and denies fever, chills, diarrhea, rash, HFS or fatigue.  She underwent CAT chest, abdomen and pelvis on 5/5/2020 which demonstrated no recurrent or metastatic disease. \par 2) Neuropathy: Very mild neuropathy noted on her fingertips which is triggered by cold temperature.  Patient has noticed the neuropathy lasting on her off week.  It is intermittent and not too bothersome for patient.  She is able to use her hands without any restrictions.\par \par 7/28/2020: \par 1) Colon CA: Patient is here for evaluation after 12 cycles of chemotherapy. She tolerated treatment well and reports no significant adverse events. She has noted a pain or discomfort in the RLQ when passes stool through that area. Has been trying to increase fiber. It does depend on certain food, eg chocolate, certain breads and cheese. \par 2) Neuropathy: She is complaining of increasing neuropathy located on her fingertips and toes especially. Will continue to observe for improvement over time. She is using Accupuncture. \par  [de-identified] : Surgeon: Yuval Haynes; [phone]\par PCP: Kimberly Melton; [phone]\par GI: Yuval Mendez; [phone]\par \par Patient Cell # [phone]\par \par

## 2020-08-15 ENCOUNTER — OUTPATIENT (OUTPATIENT)
Dept: OUTPATIENT SERVICES | Facility: HOSPITAL | Age: 60
LOS: 1 days | Discharge: HOME | End: 2020-08-15

## 2020-08-15 DIAGNOSIS — Z98.891 HISTORY OF UTERINE SCAR FROM PREVIOUS SURGERY: Chronic | ICD-10-CM

## 2020-08-15 DIAGNOSIS — Z98.890 OTHER SPECIFIED POSTPROCEDURAL STATES: Chronic | ICD-10-CM

## 2020-08-15 DIAGNOSIS — C18.9 MALIGNANT NEOPLASM OF COLON, UNSPECIFIED: ICD-10-CM

## 2020-08-18 ENCOUNTER — APPOINTMENT (OUTPATIENT)
Dept: HEMATOLOGY ONCOLOGY | Facility: CLINIC | Age: 60
End: 2020-08-18
Payer: COMMERCIAL

## 2020-08-18 PROCEDURE — 99441: CPT

## 2020-08-21 ENCOUNTER — RESULT REVIEW (OUTPATIENT)
Age: 60
End: 2020-08-21

## 2020-08-21 ENCOUNTER — LABORATORY RESULT (OUTPATIENT)
Age: 60
End: 2020-08-21

## 2020-08-21 ENCOUNTER — APPOINTMENT (OUTPATIENT)
Dept: INFUSION THERAPY | Facility: HOSPITAL | Age: 60
End: 2020-08-21

## 2020-08-21 LAB
BASOPHILS # BLD AUTO: 0.03 K/UL — SIGNIFICANT CHANGE UP (ref 0–0.2)
BASOPHILS NFR BLD AUTO: 0.8 % — SIGNIFICANT CHANGE UP (ref 0–2)
EOSINOPHIL # BLD AUTO: 0.08 K/UL — SIGNIFICANT CHANGE UP (ref 0–0.5)
EOSINOPHIL NFR BLD AUTO: 2.2 % — SIGNIFICANT CHANGE UP (ref 0–6)
HCT VFR BLD CALC: 38 % — SIGNIFICANT CHANGE UP (ref 34.5–45)
HGB BLD-MCNC: 12.4 G/DL — SIGNIFICANT CHANGE UP (ref 11.5–15.5)
IMM GRANULOCYTES NFR BLD AUTO: 0.3 % — SIGNIFICANT CHANGE UP (ref 0–1.5)
LYMPHOCYTES # BLD AUTO: 1.06 K/UL — SIGNIFICANT CHANGE UP (ref 1–3.3)
LYMPHOCYTES # BLD AUTO: 29.1 % — SIGNIFICANT CHANGE UP (ref 13–44)
MCHC RBC-ENTMCNC: 28.5 PG — SIGNIFICANT CHANGE UP (ref 27–34)
MCHC RBC-ENTMCNC: 32.6 GM/DL — SIGNIFICANT CHANGE UP (ref 32–36)
MCV RBC AUTO: 87.4 FL — SIGNIFICANT CHANGE UP (ref 80–100)
MONOCYTES # BLD AUTO: 0.31 K/UL — SIGNIFICANT CHANGE UP (ref 0–0.9)
MONOCYTES NFR BLD AUTO: 8.5 % — SIGNIFICANT CHANGE UP (ref 2–14)
NEUTROPHILS # BLD AUTO: 2.15 K/UL — SIGNIFICANT CHANGE UP (ref 1.8–7.4)
NEUTROPHILS NFR BLD AUTO: 59.1 % — SIGNIFICANT CHANGE UP (ref 43–77)
NRBC # BLD: 0 /100 WBCS — SIGNIFICANT CHANGE UP (ref 0–0)
PLATELET # BLD AUTO: 142 K/UL — LOW (ref 150–400)
RBC # BLD: 4.35 M/UL — SIGNIFICANT CHANGE UP (ref 3.8–5.2)
RBC # FLD: 14.5 % — SIGNIFICANT CHANGE UP (ref 10.3–14.5)
WBC # BLD: 3.64 K/UL — LOW (ref 3.8–10.5)
WBC # FLD AUTO: 3.64 K/UL — LOW (ref 3.8–10.5)

## 2020-09-08 ENCOUNTER — APPOINTMENT (OUTPATIENT)
Dept: CT IMAGING | Facility: IMAGING CENTER | Age: 60
End: 2020-09-08
Payer: COMMERCIAL

## 2020-09-08 ENCOUNTER — RESULT REVIEW (OUTPATIENT)
Age: 60
End: 2020-09-08

## 2020-09-08 ENCOUNTER — OUTPATIENT (OUTPATIENT)
Dept: OUTPATIENT SERVICES | Facility: HOSPITAL | Age: 60
LOS: 1 days | End: 2020-09-08
Payer: COMMERCIAL

## 2020-09-08 DIAGNOSIS — Z98.890 OTHER SPECIFIED POSTPROCEDURAL STATES: Chronic | ICD-10-CM

## 2020-09-08 DIAGNOSIS — Z98.891 HISTORY OF UTERINE SCAR FROM PREVIOUS SURGERY: Chronic | ICD-10-CM

## 2020-09-08 DIAGNOSIS — Z00.8 ENCOUNTER FOR OTHER GENERAL EXAMINATION: ICD-10-CM

## 2020-09-08 DIAGNOSIS — C18.1 MALIGNANT NEOPLASM OF APPENDIX: ICD-10-CM

## 2020-09-08 PROCEDURE — 74177 CT ABD & PELVIS W/CONTRAST: CPT | Mod: 26

## 2020-09-08 PROCEDURE — 71260 CT THORAX DX C+: CPT

## 2020-09-08 PROCEDURE — 74177 CT ABD & PELVIS W/CONTRAST: CPT

## 2020-09-08 PROCEDURE — 71260 CT THORAX DX C+: CPT | Mod: 26

## 2020-09-30 ENCOUNTER — OUTPATIENT (OUTPATIENT)
Dept: OUTPATIENT SERVICES | Facility: HOSPITAL | Age: 60
LOS: 1 days | Discharge: HOME | End: 2020-09-30

## 2020-09-30 ENCOUNTER — APPOINTMENT (OUTPATIENT)
Dept: COLORECTAL SURGERY | Facility: CLINIC | Age: 60
End: 2020-09-30

## 2020-09-30 DIAGNOSIS — Z98.891 HISTORY OF UTERINE SCAR FROM PREVIOUS SURGERY: Chronic | ICD-10-CM

## 2020-09-30 DIAGNOSIS — Z98.890 OTHER SPECIFIED POSTPROCEDURAL STATES: Chronic | ICD-10-CM

## 2020-09-30 DIAGNOSIS — C18.9 MALIGNANT NEOPLASM OF COLON, UNSPECIFIED: ICD-10-CM

## 2020-10-03 DIAGNOSIS — Z01.818 ENCOUNTER FOR OTHER PREPROCEDURAL EXAMINATION: ICD-10-CM

## 2020-10-04 ENCOUNTER — APPOINTMENT (OUTPATIENT)
Dept: DISASTER EMERGENCY | Facility: CLINIC | Age: 60
End: 2020-10-04

## 2020-10-04 LAB — SARS-COV-2 N GENE NPH QL NAA+PROBE: NOT DETECTED

## 2020-10-07 ENCOUNTER — RESULT REVIEW (OUTPATIENT)
Age: 60
End: 2020-10-07

## 2020-10-07 ENCOUNTER — LABORATORY RESULT (OUTPATIENT)
Age: 60
End: 2020-10-07

## 2020-10-07 ENCOUNTER — APPOINTMENT (OUTPATIENT)
Dept: COLORECTAL SURGERY | Facility: CLINIC | Age: 60
End: 2020-10-07
Payer: COMMERCIAL

## 2020-10-07 ENCOUNTER — APPOINTMENT (OUTPATIENT)
Dept: INFUSION THERAPY | Facility: HOSPITAL | Age: 60
End: 2020-10-07

## 2020-10-07 LAB
BASOPHILS # BLD AUTO: 0.02 K/UL — SIGNIFICANT CHANGE UP (ref 0–0.2)
BASOPHILS NFR BLD AUTO: 0.4 % — SIGNIFICANT CHANGE UP (ref 0–2)
EOSINOPHIL # BLD AUTO: 0.04 K/UL — SIGNIFICANT CHANGE UP (ref 0–0.5)
EOSINOPHIL NFR BLD AUTO: 0.9 % — SIGNIFICANT CHANGE UP (ref 0–6)
HCT VFR BLD CALC: 39.2 % — SIGNIFICANT CHANGE UP (ref 34.5–45)
HGB BLD-MCNC: 12.9 G/DL — SIGNIFICANT CHANGE UP (ref 11.5–15.5)
IMM GRANULOCYTES NFR BLD AUTO: 0.4 % — SIGNIFICANT CHANGE UP (ref 0–1.5)
LYMPHOCYTES # BLD AUTO: 1.06 K/UL — SIGNIFICANT CHANGE UP (ref 1–3.3)
LYMPHOCYTES # BLD AUTO: 23.1 % — SIGNIFICANT CHANGE UP (ref 13–44)
MCHC RBC-ENTMCNC: 27.7 PG — SIGNIFICANT CHANGE UP (ref 27–34)
MCHC RBC-ENTMCNC: 32.9 G/DL — SIGNIFICANT CHANGE UP (ref 32–36)
MCV RBC AUTO: 84.3 FL — SIGNIFICANT CHANGE UP (ref 80–100)
MONOCYTES # BLD AUTO: 0.35 K/UL — SIGNIFICANT CHANGE UP (ref 0–0.9)
MONOCYTES NFR BLD AUTO: 7.6 % — SIGNIFICANT CHANGE UP (ref 2–14)
NEUTROPHILS # BLD AUTO: 3.1 K/UL — SIGNIFICANT CHANGE UP (ref 1.8–7.4)
NEUTROPHILS NFR BLD AUTO: 67.6 % — SIGNIFICANT CHANGE UP (ref 43–77)
NRBC # BLD: 0 /100 WBCS — SIGNIFICANT CHANGE UP (ref 0–0)
PLATELET # BLD AUTO: 142 K/UL — LOW (ref 150–400)
RBC # BLD: 4.65 M/UL — SIGNIFICANT CHANGE UP (ref 3.8–5.2)
RBC # FLD: 13.4 % — SIGNIFICANT CHANGE UP (ref 10.3–14.5)
WBC # BLD: 4.59 K/UL — SIGNIFICANT CHANGE UP (ref 3.8–10.5)
WBC # FLD AUTO: 4.59 K/UL — SIGNIFICANT CHANGE UP (ref 3.8–10.5)

## 2020-10-07 PROCEDURE — 45378 DIAGNOSTIC COLONOSCOPY: CPT

## 2020-10-08 ENCOUNTER — APPOINTMENT (OUTPATIENT)
Dept: MAMMOGRAPHY | Facility: IMAGING CENTER | Age: 60
End: 2020-10-08
Payer: COMMERCIAL

## 2020-10-08 ENCOUNTER — APPOINTMENT (OUTPATIENT)
Dept: ULTRASOUND IMAGING | Facility: IMAGING CENTER | Age: 60
End: 2020-10-08
Payer: COMMERCIAL

## 2020-10-08 ENCOUNTER — OUTPATIENT (OUTPATIENT)
Dept: OUTPATIENT SERVICES | Facility: HOSPITAL | Age: 60
LOS: 1 days | End: 2020-10-08
Payer: COMMERCIAL

## 2020-10-08 DIAGNOSIS — Z98.891 HISTORY OF UTERINE SCAR FROM PREVIOUS SURGERY: Chronic | ICD-10-CM

## 2020-10-08 DIAGNOSIS — Z98.890 OTHER SPECIFIED POSTPROCEDURAL STATES: Chronic | ICD-10-CM

## 2020-10-08 DIAGNOSIS — Z00.8 ENCOUNTER FOR OTHER GENERAL EXAMINATION: ICD-10-CM

## 2020-10-08 PROCEDURE — 76641 ULTRASOUND BREAST COMPLETE: CPT

## 2020-10-08 PROCEDURE — 76641 ULTRASOUND BREAST COMPLETE: CPT | Mod: 26,50

## 2020-10-08 PROCEDURE — 77063 BREAST TOMOSYNTHESIS BI: CPT | Mod: 26

## 2020-10-08 PROCEDURE — 77067 SCR MAMMO BI INCL CAD: CPT | Mod: 26

## 2020-10-08 PROCEDURE — 77067 SCR MAMMO BI INCL CAD: CPT

## 2020-10-08 PROCEDURE — 77063 BREAST TOMOSYNTHESIS BI: CPT

## 2020-10-12 NOTE — CONSULT LETTER
[Dear  ___] : Dear  [unfilled], [Courtesy Letter:] : I had the pleasure of seeing your patient, [unfilled], in my office today. [Please see my note below.] : Please see my note below. [Consult Closing:] : Thank you very much for allowing me to participate in the care of this patient.  If you have any questions, please do not hesitate to contact me. [Sincerely,] : Sincerely, [DrVadim  ___] : Dr. ALVARADO [DrVadim ___] : Dr. ALVARADO

## 2020-10-13 ENCOUNTER — APPOINTMENT (OUTPATIENT)
Dept: HEMATOLOGY ONCOLOGY | Facility: CLINIC | Age: 60
End: 2020-10-13
Payer: COMMERCIAL

## 2020-10-13 PROCEDURE — 99214 OFFICE O/P EST MOD 30 MIN: CPT | Mod: 95

## 2020-10-13 RX ORDER — DEXAMETHASONE 4 MG/1
4 TABLET ORAL
Qty: 20 | Refills: 2 | Status: DISCONTINUED | COMMUNITY
Start: 2019-12-12 | End: 2020-10-13

## 2020-10-13 NOTE — REVIEW OF SYSTEMS
[Anxiety] : anxiety [Negative] : Allergic/Immunologic [FreeTextEntry2] : mild fatigue but is able to push through. [FreeTextEntry9] : low back discomfort, no UTI symptoms. [de-identified] : hypothyroidism

## 2020-10-13 NOTE — HISTORY OF PRESENT ILLNESS
[Disease: _____________________] : Disease: [unfilled] [T: ___] : T[unfilled] [N: ___] : N[unfilled] [M: ___] : M[unfilled] [AJCC Stage: ____] : AJCC Stage: [unfilled] [de-identified] : Ms. Rutherford is a 58 year old female with past medical history of Graves disease presenting to the office for an initial consultation of colon CA.\par \par Patient c/o right LQ pain with occasional bloating, denies change in bowel habits for several months. Had a colonoscopy in 2012 that is self-reported as normal. 2017 pelvic sono was negative. \par \par She was seen by GI in late 10/2019.  CAT abdomen with contrast ordered which revealed cecal mass with enlarged LNs.  F/U colonoscopy revealed sessile SC polyp which was biopsied (hyperplastic) and a malignant appearing mass involving IC valve/cecum.\par Pathology: adenocarcinoma\par \par Patient underwent right hemicolectomy on 11/11/2019 with Dr. 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  CAT chest, abdomen and pelvis performed on 12/30/2019 demonstrated no evidence of metastatic disease.  Mediport placed on 12/3/2019.  Today her main complaint is cough which started on 12/4/2019.  Cough is productive FOR PAST DAY. We reviewed FOLFOX and side effects and purpose of treatment. Consent process performed and signed by patient.\par \par 12/24/19: \par Colon CA: Patient here for toxicity evaluation s/p FOLFOX C1 on 12/16/2019.  She tolerated infusion well and developed no anaphylaxis reactions.  Denies fever, chills, mouth sores, neuropathy, diarrhea or weight loss.  CBC with diff performed on 12/23/2019 was unremarkable.\par Broken Tooth: Patient noticed over the weekend her R molar broke off.  She was seen by her dentist who performed a filling however states patient will require a root canal.  \par Left gingiva swelling: Noted after her first treatment.  She was seen by her dentist who rx a local anesthetic with excellent relief.\par Fatigue: Mild fatigue 2-3 days after her first treatment however abates during her off week.  She is able to perform her routine/instrumental ADLs without restrictions.\par \par 1/15/2020: \par Colon CA: Today is C3D3 FOLFOX. Due to neutropenia during C2, 5-FU IVP was discontinued.  Patient has notice less fatigue afterwards.  She is doing well on treatment and reports no major adverse events.  She has intermittent cold induce neuropathy on days of treatment and 2-3 days afterwards however abates.  She denies fever, chills, mouth sores, diarrhea or mouth sores.\par Social: Patient in excellent spirits today.  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 She notices that the neuropathy is totally gone in between treatments. However she must avoid cold entirely. She has been very active in teaching and work. She has been singing regularly and will go to [employer] in a week to sing. She went to [place] 2 weeks ago for ~ 4 days to visit her ill mother who is suffering from dementia. Overall, she is managing the psychological component of her treatment pretty well; and had relied on strategies from Dr. Moscoso, and support from friends. She feels relatively weak since the surgery and chemotherapy and is asking if she can participate in pilates classes and mild/moderate strengthening exercises, to which I supported.  She does not some left low back/flank pain in the AM and is concerned about her kidney.\par \par 3/11/2020:\par 1) Colon CA: C6 FOLFOX given on 3/4/2020.  Today her main complaint is fatigue.\par 2) Fatigue: Started on D4 of her last treatment.  Up until that point, she was doing extremely well.  On day 4-6 patient felt increase in fatigue.  She states she was in bed all day sleeping and is now fully recovering.  She is able to perform her routine/instrumental ADLs without any restrictions.  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   Patient has noticed she is pushing her self too much with working full time, taking care of her child and going to gym.  We have decided to cut back on work and see how patient responds after C7 of FOLFOX.\par 3) Anxiety: She currently works in [place] and is worried about her exposure to COVID-19.  She has decided to take 2-3 weeks off from work.\par \par 4/1/2020:\par 1) Colon CA: Today is C8 FOLFOX.\par 2) Neuropathy: Patient notices neuropathy located on her bilateral fingertips on days of treatment especially if she's exposed to cold temperatures.   Generally speaking patient wears mittens and notices if she is active the neuropathy tends to improve.  She denies neuropathy on her off week and denies it today.  She is able to functions and use her hands without any restrictions.\par 3) Social She is currently not working due to COVID-19 pandemic.\par 4) Left flank pain: Patient is complaining of intermittent lower back/flank pain that generally happens at night time.  She denies fever, chills, dysuria, hematuria or lifting heavy object.  She is concerned its her kidneys however her renal functions are WNL and last scan on 11/2019 demonstrated no abnormality in the kidneys.\par \par 5/6/2020:\par 1) Colon CA: Patient was scheduled for C10 FOLFOX today however due to thrombocytopenia, will delay treatment x one week.  She is feeling well today and denies fever, chills, diarrhea, rash, HFS or fatigue.  She underwent CAT chest, abdomen and pelvis on 5/5/2020 which demonstrated no recurrent or metastatic disease. \par 2) Neuropathy: Very mild neuropathy noted on her fingertips which is triggered by cold temperature.  Patient has noticed the neuropathy lasting on her off week.  It is intermittent and not too bothersome for patient.  She is able to use her hands without any restrictions.\par \par 7/28/2020: \par 1) Colon CA: Patient is here for evaluation after 12 cycles of chemotherapy. She tolerated treatment well and reports no significant adverse events. She has noted a pain or discomfort in the RLQ when passes stool through that area. Has been trying to increase fiber. It does depend on certain food, eg chocolate, certain breads and cheese. \par 2) Neuropathy: She is complaining of increasing neuropathy located on her fingertips and toes especially. Will continue to observe for improvement over time. She is using Accupuncture. \par \par 10/13/2020\par Mammogram, breast US reviewed and are negative. Repeat in 3 years, and reviewed with patient\par Colonoscopy negative as per patient. Will repeat annually.\par Labs reviewed - mild elevation, and trending better.\par Neuropathy - improving in hands and slower in feet.\par Working for school as .\par  [de-identified] : adenocarcinoma [de-identified] : Surgeon: Yuval Haynes; [phone]\par PCP: Kimberly Melton; [phone]\par GI: Yuval Mendez; [phone]\par \par Patient Cell # [phone]\par \par

## 2020-12-03 ENCOUNTER — OUTPATIENT (OUTPATIENT)
Dept: OUTPATIENT SERVICES | Facility: HOSPITAL | Age: 60
LOS: 1 days | Discharge: HOME | End: 2020-12-03

## 2020-12-03 DIAGNOSIS — Z98.891 HISTORY OF UTERINE SCAR FROM PREVIOUS SURGERY: Chronic | ICD-10-CM

## 2020-12-03 DIAGNOSIS — Z98.890 OTHER SPECIFIED POSTPROCEDURAL STATES: Chronic | ICD-10-CM

## 2020-12-03 DIAGNOSIS — C18.9 MALIGNANT NEOPLASM OF COLON, UNSPECIFIED: ICD-10-CM

## 2020-12-08 ENCOUNTER — LABORATORY RESULT (OUTPATIENT)
Age: 60
End: 2020-12-08

## 2020-12-08 ENCOUNTER — RESULT REVIEW (OUTPATIENT)
Age: 60
End: 2020-12-08

## 2020-12-08 ENCOUNTER — APPOINTMENT (OUTPATIENT)
Dept: INFUSION THERAPY | Facility: HOSPITAL | Age: 60
End: 2020-12-08

## 2020-12-08 LAB
BASOPHILS # BLD AUTO: 0.03 K/UL — SIGNIFICANT CHANGE UP (ref 0–0.2)
BASOPHILS NFR BLD AUTO: 0.8 % — SIGNIFICANT CHANGE UP (ref 0–2)
EOSINOPHIL # BLD AUTO: 0.12 K/UL — SIGNIFICANT CHANGE UP (ref 0–0.5)
EOSINOPHIL NFR BLD AUTO: 3.3 % — SIGNIFICANT CHANGE UP (ref 0–6)
HCT VFR BLD CALC: 39.8 % — SIGNIFICANT CHANGE UP (ref 34.5–45)
HGB BLD-MCNC: 12.5 G/DL — SIGNIFICANT CHANGE UP (ref 11.5–15.5)
IMM GRANULOCYTES NFR BLD AUTO: 0.3 % — SIGNIFICANT CHANGE UP (ref 0–1.5)
LYMPHOCYTES # BLD AUTO: 1.15 K/UL — SIGNIFICANT CHANGE UP (ref 1–3.3)
LYMPHOCYTES # BLD AUTO: 31.6 % — SIGNIFICANT CHANGE UP (ref 13–44)
MCHC RBC-ENTMCNC: 27.7 PG — SIGNIFICANT CHANGE UP (ref 27–34)
MCHC RBC-ENTMCNC: 31.4 G/DL — LOW (ref 32–36)
MCV RBC AUTO: 88.1 FL — SIGNIFICANT CHANGE UP (ref 80–100)
MONOCYTES # BLD AUTO: 0.31 K/UL — SIGNIFICANT CHANGE UP (ref 0–0.9)
MONOCYTES NFR BLD AUTO: 8.5 % — SIGNIFICANT CHANGE UP (ref 2–14)
NEUTROPHILS # BLD AUTO: 2.02 K/UL — SIGNIFICANT CHANGE UP (ref 1.8–7.4)
NEUTROPHILS NFR BLD AUTO: 55.5 % — SIGNIFICANT CHANGE UP (ref 43–77)
NRBC # BLD: 0 /100 WBCS — SIGNIFICANT CHANGE UP (ref 0–0)
PLATELET # BLD AUTO: 149 K/UL — LOW (ref 150–400)
RBC # BLD: 4.52 M/UL — SIGNIFICANT CHANGE UP (ref 3.8–5.2)
RBC # FLD: 13.6 % — SIGNIFICANT CHANGE UP (ref 10.3–14.5)
WBC # BLD: 3.64 K/UL — LOW (ref 3.8–10.5)
WBC # FLD AUTO: 3.64 K/UL — LOW (ref 3.8–10.5)

## 2021-02-04 ENCOUNTER — OUTPATIENT (OUTPATIENT)
Dept: OUTPATIENT SERVICES | Facility: HOSPITAL | Age: 61
LOS: 1 days | Discharge: HOME | End: 2021-02-04

## 2021-02-04 DIAGNOSIS — Z98.890 OTHER SPECIFIED POSTPROCEDURAL STATES: Chronic | ICD-10-CM

## 2021-02-04 DIAGNOSIS — C18.9 MALIGNANT NEOPLASM OF COLON, UNSPECIFIED: ICD-10-CM

## 2021-02-04 DIAGNOSIS — Z98.891 HISTORY OF UTERINE SCAR FROM PREVIOUS SURGERY: Chronic | ICD-10-CM

## 2021-02-09 ENCOUNTER — RESULT REVIEW (OUTPATIENT)
Age: 61
End: 2021-02-09

## 2021-02-09 ENCOUNTER — APPOINTMENT (OUTPATIENT)
Dept: CT IMAGING | Facility: IMAGING CENTER | Age: 61
End: 2021-02-09
Payer: COMMERCIAL

## 2021-02-09 ENCOUNTER — LABORATORY RESULT (OUTPATIENT)
Age: 61
End: 2021-02-09

## 2021-02-09 ENCOUNTER — OUTPATIENT (OUTPATIENT)
Dept: OUTPATIENT SERVICES | Facility: HOSPITAL | Age: 61
LOS: 1 days | End: 2021-02-09
Payer: COMMERCIAL

## 2021-02-09 ENCOUNTER — APPOINTMENT (OUTPATIENT)
Dept: INFUSION THERAPY | Facility: HOSPITAL | Age: 61
End: 2021-02-09

## 2021-02-09 DIAGNOSIS — C18.1 MALIGNANT NEOPLASM OF APPENDIX: ICD-10-CM

## 2021-02-09 DIAGNOSIS — Z98.891 HISTORY OF UTERINE SCAR FROM PREVIOUS SURGERY: Chronic | ICD-10-CM

## 2021-02-09 DIAGNOSIS — Z00.8 ENCOUNTER FOR OTHER GENERAL EXAMINATION: ICD-10-CM

## 2021-02-09 DIAGNOSIS — Z98.890 OTHER SPECIFIED POSTPROCEDURAL STATES: Chronic | ICD-10-CM

## 2021-02-09 LAB
BASOPHILS # BLD AUTO: 0.02 K/UL — SIGNIFICANT CHANGE UP (ref 0–0.2)
BASOPHILS NFR BLD AUTO: 0.5 % — SIGNIFICANT CHANGE UP (ref 0–2)
EOSINOPHIL # BLD AUTO: 0.15 K/UL — SIGNIFICANT CHANGE UP (ref 0–0.5)
EOSINOPHIL NFR BLD AUTO: 3.5 % — SIGNIFICANT CHANGE UP (ref 0–6)
HCT VFR BLD CALC: 38.9 % — SIGNIFICANT CHANGE UP (ref 34.5–45)
HGB BLD-MCNC: 12.4 G/DL — SIGNIFICANT CHANGE UP (ref 11.5–15.5)
IMM GRANULOCYTES NFR BLD AUTO: 0.5 % — SIGNIFICANT CHANGE UP (ref 0–1.5)
LYMPHOCYTES # BLD AUTO: 0.99 K/UL — LOW (ref 1–3.3)
LYMPHOCYTES # BLD AUTO: 23.2 % — SIGNIFICANT CHANGE UP (ref 13–44)
MCHC RBC-ENTMCNC: 27.7 PG — SIGNIFICANT CHANGE UP (ref 27–34)
MCHC RBC-ENTMCNC: 31.9 G/DL — LOW (ref 32–36)
MCV RBC AUTO: 87 FL — SIGNIFICANT CHANGE UP (ref 80–100)
MONOCYTES # BLD AUTO: 0.43 K/UL — SIGNIFICANT CHANGE UP (ref 0–0.9)
MONOCYTES NFR BLD AUTO: 10.1 % — SIGNIFICANT CHANGE UP (ref 2–14)
NEUTROPHILS # BLD AUTO: 2.65 K/UL — SIGNIFICANT CHANGE UP (ref 1.8–7.4)
NEUTROPHILS NFR BLD AUTO: 62.2 % — SIGNIFICANT CHANGE UP (ref 43–77)
NRBC # BLD: 0 /100 WBCS — SIGNIFICANT CHANGE UP (ref 0–0)
PLATELET # BLD AUTO: 147 K/UL — LOW (ref 150–400)
RBC # BLD: 4.47 M/UL — SIGNIFICANT CHANGE UP (ref 3.8–5.2)
RBC # FLD: 13.4 % — SIGNIFICANT CHANGE UP (ref 10.3–14.5)
WBC # BLD: 4.26 K/UL — SIGNIFICANT CHANGE UP (ref 3.8–10.5)
WBC # FLD AUTO: 4.26 K/UL — SIGNIFICANT CHANGE UP (ref 3.8–10.5)

## 2021-02-09 PROCEDURE — 71260 CT THORAX DX C+: CPT

## 2021-02-09 PROCEDURE — 74177 CT ABD & PELVIS W/CONTRAST: CPT

## 2021-02-09 PROCEDURE — 74177 CT ABD & PELVIS W/CONTRAST: CPT | Mod: 26

## 2021-02-09 PROCEDURE — 71260 CT THORAX DX C+: CPT | Mod: 26

## 2021-02-10 ENCOUNTER — NON-APPOINTMENT (OUTPATIENT)
Age: 61
End: 2021-02-10

## 2021-02-23 ENCOUNTER — APPOINTMENT (OUTPATIENT)
Dept: ULTRASOUND IMAGING | Facility: IMAGING CENTER | Age: 61
End: 2021-02-23
Payer: COMMERCIAL

## 2021-02-23 ENCOUNTER — OUTPATIENT (OUTPATIENT)
Dept: OUTPATIENT SERVICES | Facility: HOSPITAL | Age: 61
LOS: 1 days | End: 2021-02-23
Payer: COMMERCIAL

## 2021-02-23 ENCOUNTER — APPOINTMENT (OUTPATIENT)
Dept: HEMATOLOGY ONCOLOGY | Facility: CLINIC | Age: 61
End: 2021-02-23
Payer: COMMERCIAL

## 2021-02-23 ENCOUNTER — RESULT REVIEW (OUTPATIENT)
Age: 61
End: 2021-02-23

## 2021-02-23 DIAGNOSIS — Z98.890 OTHER SPECIFIED POSTPROCEDURAL STATES: Chronic | ICD-10-CM

## 2021-02-23 DIAGNOSIS — Z98.891 HISTORY OF UTERINE SCAR FROM PREVIOUS SURGERY: Chronic | ICD-10-CM

## 2021-02-23 DIAGNOSIS — D49.0 NEOPLASM OF UNSPECIFIED BEHAVIOR OF DIGESTIVE SYSTEM: ICD-10-CM

## 2021-02-23 PROCEDURE — 99214 OFFICE O/P EST MOD 30 MIN: CPT | Mod: 95

## 2021-02-23 PROCEDURE — 76882 US LMTD JT/FCL EVL NVASC XTR: CPT

## 2021-02-23 PROCEDURE — 76882 US LMTD JT/FCL EVL NVASC XTR: CPT | Mod: 26,LT

## 2021-02-23 NOTE — REVIEW OF SYSTEMS
[Anxiety] : anxiety [Negative] : Allergic/Immunologic [FreeTextEntry2] : mild fatigue but is able to push through. [FreeTextEntry9] : low back discomfort, no UTI symptoms. [de-identified] : hypothyroidism

## 2021-02-23 NOTE — HISTORY OF PRESENT ILLNESS
[Disease: _____________________] : Disease: [unfilled] [T: ___] : T[unfilled] [N: ___] : N[unfilled] [M: ___] : M[unfilled] [AJCC Stage: ____] : AJCC Stage: [unfilled] [Home] : at home, [unfilled] , at the time of the visit. [Medical Office: ([facility])___] : at the medical office located in  [Verbal consent obtained from patient] : the patient, [unfilled] [de-identified] : Ms. Rutherford is a 58 year old female with past medical history of Graves disease presenting to the office for an initial consultation of colon CA.\par \par Patient c/o right LQ pain with occasional bloating, denies change in bowel habits for several months. Had a colonoscopy in 2012 that is self-reported as normal. 2017 pelvic sono was negative. \par \par She was seen by GI in late 10/2019.  CAT abdomen with contrast ordered which revealed cecal mass with enlarged LNs.  F/U colonoscopy revealed sessile SC polyp which was biopsied (hyperplastic) and a malignant appearing mass involving IC valve/cecum.\par Pathology: adenocarcinoma\par \par Patient underwent right hemicolectomy on 11/11/2019 with Dr. 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  CAT chest, abdomen and pelvis performed on 12/30/2019 demonstrated no evidence of metastatic disease.  Mediport placed on 12/3/2019.  Today her main complaint is cough which started on 12/4/2019.  Cough is productive FOR PAST DAY. We reviewed FOLFOX and side effects and purpose of treatment. Consent process performed and signed by patient.\par \par 12/24/19: \par Colon CA: Patient here for toxicity evaluation s/p FOLFOX C1 on 12/16/2019.  She tolerated infusion well and developed no anaphylaxis reactions.  Denies fever, chills, mouth sores, neuropathy, diarrhea or weight loss.  CBC with diff performed on 12/23/2019 was unremarkable.\par Broken Tooth: Patient noticed over the weekend her R molar broke off.  She was seen by her dentist who performed a filling however states patient will require a root canal.  \par Left gingiva swelling: Noted after her first treatment.  She was seen by her dentist who rx a local anesthetic with excellent relief.\par Fatigue: Mild fatigue 2-3 days after her first treatment however abates during her off week.  She is able to perform her routine/instrumental ADLs without restrictions.\par \par 1/15/2020: \par Colon CA: Today is C3D3 FOLFOX. Due to neutropenia during C2, 5-FU IVP was discontinued.  Patient has notice less fatigue afterwards.  She is doing well on treatment and reports no major adverse events.  She has intermittent cold induce neuropathy on days of treatment and 2-3 days afterwards however abates.  She denies fever, chills, mouth sores, diarrhea or mouth sores.\par Social: Patient in excellent spirits today.  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 She notices that the neuropathy is totally gone in between treatments. However she must avoid cold entirely. She has been very active in teaching and work. She has been singing regularly and will go to [email or web address] in a week to sing. She went to [place] 2 weeks ago for ~ 4 days to visit her ill mother who is suffering from dementia. Overall, she is managing the psychological component of her treatment pretty well; and had relied on strategies from Dr. Moscoso, and support from friends. She feels relatively weak since the surgery and chemotherapy and is asking if she can participate in pilates classes and mild/moderate strengthening exercises, to which I supported.  She does not some left low back/flank pain in the AM and is concerned about her kidney.\par \par 3/11/2020:\par 1) Colon CA: C6 FOLFOX given on 3/4/2020.  Today her main complaint is fatigue.\par 2) Fatigue: Started on D4 of her last treatment.  Up until that point, she was doing extremely well.  On day 4-6 patient felt increase in fatigue.  She states she was in bed all day sleeping and is now fully recovering.  She is able to perform her routine/instrumental ADLs without any restrictions.  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   Patient has noticed she is pushing her self too much with working full time, taking care of her child and going to gym.  We have decided to cut back on work and see how patient responds after C7 of FOLFOX.\par 3) Anxiety: She currently works in [place] and is worried about her exposure to COVID-19.  She has decided to take 2-3 weeks off from work.\par \par 4/1/2020:\par 1) Colon CA: Today is C8 FOLFOX.\par 2) Neuropathy: Patient notices neuropathy located on her bilateral fingertips on days of treatment especially if she's exposed to cold temperatures.   Generally speaking patient wears mittens and notices if she is active the neuropathy tends to improve.  She denies neuropathy on her off week and denies it today.  She is able to functions and use her hands without any restrictions.\par 3) Social She is currently not working due to COVID-19 pandemic.\par 4) Left flank pain: Patient is complaining of intermittent lower back/flank pain that generally happens at night time.  She denies fever, chills, dysuria, hematuria or lifting heavy object.  She is concerned its her kidneys however her renal functions are WNL and last scan on 11/2019 demonstrated no abnormality in the kidneys.\par \par 5/6/2020:\par 1) Colon CA: Patient was scheduled for C10 FOLFOX today however due to thrombocytopenia, will delay treatment x one week.  She is feeling well today and denies fever, chills, diarrhea, rash, HFS or fatigue.  She underwent CAT chest, abdomen and pelvis on 5/5/2020 which demonstrated no recurrent or metastatic disease. \par 2) Neuropathy: Very mild neuropathy noted on her fingertips which is triggered by cold temperature.  Patient has noticed the neuropathy lasting on her off week.  It is intermittent and not too bothersome for patient.  She is able to use her hands without any restrictions.\par \par 7/28/2020: \par 1) Colon CA: Patient is here for evaluation after 12 cycles of chemotherapy. She tolerated treatment well and reports no significant adverse events. She has noted a pain or discomfort in the RLQ when passes stool through that area. Has been trying to increase fiber. It does depend on certain food, eg chocolate, certain breads and cheese. \par 2) Neuropathy: She is complaining of increasing neuropathy located on her fingertips and toes especially. Will continue to observe for improvement over time. She is using Accupuncture. \par \par 2/23/21\par Mammogram, breast US reviewed and are negative. Will repeat annually. \par Colonoscopy negative as per patient. Repeat in 3 years, and reviewed with patient\par Labs reviewed\par Neuropathy - improving in hands and slower in feet.\par Working for school as .\par Had both COVID vaccines - the last was on 2/4, just prior to surveillance CT scan and in the same arm as the mildly enlarged node in the axilla on left.\par  [de-identified] : adenocarcinoma [de-identified] : Surgeon: Yuval Haynes; [phone]\par PCP: Kimberly Melton; [phone]\par GI: Yuval Mendez; [phone]\par \par Patient Cell # [phone]\par \par

## 2021-05-19 ENCOUNTER — OUTPATIENT (OUTPATIENT)
Dept: OUTPATIENT SERVICES | Facility: HOSPITAL | Age: 61
LOS: 1 days | Discharge: HOME | End: 2021-05-19

## 2021-05-19 DIAGNOSIS — C18.9 MALIGNANT NEOPLASM OF COLON, UNSPECIFIED: ICD-10-CM

## 2021-05-19 DIAGNOSIS — Z98.890 OTHER SPECIFIED POSTPROCEDURAL STATES: Chronic | ICD-10-CM

## 2021-05-19 DIAGNOSIS — Z98.891 HISTORY OF UTERINE SCAR FROM PREVIOUS SURGERY: Chronic | ICD-10-CM

## 2021-05-20 ENCOUNTER — RESULT REVIEW (OUTPATIENT)
Age: 61
End: 2021-05-20

## 2021-05-20 ENCOUNTER — LABORATORY RESULT (OUTPATIENT)
Age: 61
End: 2021-05-20

## 2021-05-20 ENCOUNTER — APPOINTMENT (OUTPATIENT)
Dept: INFUSION THERAPY | Facility: HOSPITAL | Age: 61
End: 2021-05-20

## 2021-05-20 LAB
BASOPHILS # BLD AUTO: 0.03 K/UL — SIGNIFICANT CHANGE UP (ref 0–0.2)
BASOPHILS NFR BLD AUTO: 0.7 % — SIGNIFICANT CHANGE UP (ref 0–2)
EOSINOPHIL # BLD AUTO: 0.08 K/UL — SIGNIFICANT CHANGE UP (ref 0–0.5)
EOSINOPHIL NFR BLD AUTO: 2 % — SIGNIFICANT CHANGE UP (ref 0–6)
HCT VFR BLD CALC: 41.4 % — SIGNIFICANT CHANGE UP (ref 34.5–45)
HGB BLD-MCNC: 13.5 G/DL — SIGNIFICANT CHANGE UP (ref 11.5–15.5)
IMM GRANULOCYTES NFR BLD AUTO: 0.2 % — SIGNIFICANT CHANGE UP (ref 0–1.5)
LYMPHOCYTES # BLD AUTO: 1.03 K/UL — SIGNIFICANT CHANGE UP (ref 1–3.3)
LYMPHOCYTES # BLD AUTO: 25.2 % — SIGNIFICANT CHANGE UP (ref 13–44)
MCHC RBC-ENTMCNC: 28.5 PG — SIGNIFICANT CHANGE UP (ref 27–34)
MCHC RBC-ENTMCNC: 32.6 G/DL — SIGNIFICANT CHANGE UP (ref 32–36)
MCV RBC AUTO: 87.5 FL — SIGNIFICANT CHANGE UP (ref 80–100)
MONOCYTES # BLD AUTO: 0.34 K/UL — SIGNIFICANT CHANGE UP (ref 0–0.9)
MONOCYTES NFR BLD AUTO: 8.3 % — SIGNIFICANT CHANGE UP (ref 2–14)
NEUTROPHILS # BLD AUTO: 2.59 K/UL — SIGNIFICANT CHANGE UP (ref 1.8–7.4)
NEUTROPHILS NFR BLD AUTO: 63.6 % — SIGNIFICANT CHANGE UP (ref 43–77)
NRBC # BLD: 0 /100 WBCS — SIGNIFICANT CHANGE UP (ref 0–0)
PLATELET # BLD AUTO: 144 K/UL — LOW (ref 150–400)
RBC # BLD: 4.73 M/UL — SIGNIFICANT CHANGE UP (ref 3.8–5.2)
RBC # FLD: 13.2 % — SIGNIFICANT CHANGE UP (ref 10.3–14.5)
WBC # BLD: 4.08 K/UL — SIGNIFICANT CHANGE UP (ref 3.8–10.5)
WBC # FLD AUTO: 4.08 K/UL — SIGNIFICANT CHANGE UP (ref 3.8–10.5)

## 2021-06-03 ENCOUNTER — APPOINTMENT (OUTPATIENT)
Dept: CT IMAGING | Facility: IMAGING CENTER | Age: 61
End: 2021-06-03
Payer: COMMERCIAL

## 2021-06-03 ENCOUNTER — OUTPATIENT (OUTPATIENT)
Dept: OUTPATIENT SERVICES | Facility: HOSPITAL | Age: 61
LOS: 1 days | End: 2021-06-03
Payer: COMMERCIAL

## 2021-06-03 DIAGNOSIS — Z00.8 ENCOUNTER FOR OTHER GENERAL EXAMINATION: ICD-10-CM

## 2021-06-03 DIAGNOSIS — Z98.890 OTHER SPECIFIED POSTPROCEDURAL STATES: Chronic | ICD-10-CM

## 2021-06-03 DIAGNOSIS — Z98.891 HISTORY OF UTERINE SCAR FROM PREVIOUS SURGERY: Chronic | ICD-10-CM

## 2021-06-03 DIAGNOSIS — C18.1 MALIGNANT NEOPLASM OF APPENDIX: ICD-10-CM

## 2021-06-03 PROCEDURE — 74177 CT ABD & PELVIS W/CONTRAST: CPT

## 2021-06-03 PROCEDURE — 74177 CT ABD & PELVIS W/CONTRAST: CPT | Mod: 26

## 2021-06-03 PROCEDURE — 71260 CT THORAX DX C+: CPT | Mod: 26

## 2021-06-03 PROCEDURE — 71260 CT THORAX DX C+: CPT

## 2021-06-09 ENCOUNTER — RESULT REVIEW (OUTPATIENT)
Age: 61
End: 2021-06-09

## 2021-06-09 ENCOUNTER — APPOINTMENT (OUTPATIENT)
Dept: HEMATOLOGY ONCOLOGY | Facility: CLINIC | Age: 61
End: 2021-06-09
Payer: COMMERCIAL

## 2021-06-09 VITALS
SYSTOLIC BLOOD PRESSURE: 115 MMHG | HEART RATE: 74 BPM | DIASTOLIC BLOOD PRESSURE: 73 MMHG | OXYGEN SATURATION: 97 % | HEIGHT: 67.32 IN | RESPIRATION RATE: 16 BRPM | TEMPERATURE: 97.3 F | WEIGHT: 123.46 LBS | BODY MASS INDEX: 19.15 KG/M2

## 2021-06-09 DIAGNOSIS — E03.9 HYPOTHYROIDISM, UNSPECIFIED: ICD-10-CM

## 2021-06-09 LAB
BASOPHILS # BLD AUTO: 0.02 K/UL — SIGNIFICANT CHANGE UP (ref 0–0.2)
BASOPHILS NFR BLD AUTO: 0.3 % — SIGNIFICANT CHANGE UP (ref 0–2)
EOSINOPHIL # BLD AUTO: 0.09 K/UL — SIGNIFICANT CHANGE UP (ref 0–0.5)
EOSINOPHIL NFR BLD AUTO: 1.5 % — SIGNIFICANT CHANGE UP (ref 0–6)
HCT VFR BLD CALC: 41.1 % — SIGNIFICANT CHANGE UP (ref 34.5–45)
HGB BLD-MCNC: 13.2 G/DL — SIGNIFICANT CHANGE UP (ref 11.5–15.5)
IMM GRANULOCYTES NFR BLD AUTO: 0.5 % — SIGNIFICANT CHANGE UP (ref 0–1.5)
LYMPHOCYTES # BLD AUTO: 1.29 K/UL — SIGNIFICANT CHANGE UP (ref 1–3.3)
LYMPHOCYTES # BLD AUTO: 21.9 % — SIGNIFICANT CHANGE UP (ref 13–44)
MCHC RBC-ENTMCNC: 28.3 PG — SIGNIFICANT CHANGE UP (ref 27–34)
MCHC RBC-ENTMCNC: 32.1 G/DL — SIGNIFICANT CHANGE UP (ref 32–36)
MCV RBC AUTO: 88.2 FL — SIGNIFICANT CHANGE UP (ref 80–100)
MONOCYTES # BLD AUTO: 0.38 K/UL — SIGNIFICANT CHANGE UP (ref 0–0.9)
MONOCYTES NFR BLD AUTO: 6.4 % — SIGNIFICANT CHANGE UP (ref 2–14)
NEUTROPHILS # BLD AUTO: 4.09 K/UL — SIGNIFICANT CHANGE UP (ref 1.8–7.4)
NEUTROPHILS NFR BLD AUTO: 69.4 % — SIGNIFICANT CHANGE UP (ref 43–77)
NRBC # BLD: 0 /100 WBCS — SIGNIFICANT CHANGE UP (ref 0–0)
PLATELET # BLD AUTO: 154 K/UL — SIGNIFICANT CHANGE UP (ref 150–400)
RBC # BLD: 4.66 M/UL — SIGNIFICANT CHANGE UP (ref 3.8–5.2)
RBC # FLD: 13.4 % — SIGNIFICANT CHANGE UP (ref 10.3–14.5)
WBC # BLD: 5.9 K/UL — SIGNIFICANT CHANGE UP (ref 3.8–10.5)
WBC # FLD AUTO: 5.9 K/UL — SIGNIFICANT CHANGE UP (ref 3.8–10.5)

## 2021-06-09 PROCEDURE — 99072 ADDL SUPL MATRL&STAF TM PHE: CPT

## 2021-06-09 PROCEDURE — 99213 OFFICE O/P EST LOW 20 MIN: CPT

## 2021-06-09 RX ORDER — LIDOCAINE AND PRILOCAINE 25; 25 MG/G; MG/G
2.5-2.5 CREAM TOPICAL
Qty: 1 | Refills: 3 | Status: DISCONTINUED | COMMUNITY
Start: 2019-12-12 | End: 2021-06-09

## 2021-06-09 NOTE — HISTORY OF PRESENT ILLNESS
[Disease: _____________________] : Disease: [unfilled] [T: ___] : T[unfilled] [N: ___] : N[unfilled] [M: ___] : M[unfilled] [AJCC Stage: ____] : AJCC Stage: [unfilled] [Home] : at home, [unfilled] , at the time of the visit. [Medical Office: ([facility])___] : at the medical office located in  [Verbal consent obtained from patient] : the patient, [unfilled] [de-identified] : Ms. Rutherford is a 58 year old female with past medical history of Graves disease presenting to the office for an initial consultation of colon CA.\par \par Patient c/o right LQ pain with occasional bloating, denies change in bowel habits for several months. Had a colonoscopy in 2012 that is self-reported as normal. 2017 pelvic sono was negative. \par \par She was seen by GI in late 10/2019.  CAT abdomen with contrast ordered which revealed cecal mass with enlarged LNs.  F/U colonoscopy revealed sessile SC polyp which was biopsied (hyperplastic) and a malignant appearing mass involving IC valve/cecum.\par Pathology: adenocarcinoma\par \par Patient underwent right hemicolectomy on 11/11/2019 with Dr. 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  CAT chest, abdomen and pelvis performed on 12/30/2019 demonstrated no evidence of metastatic disease.  Mediport placed on 12/3/2019.  Today her main complaint is cough which started on 12/4/2019.  Cough is productive FOR PAST DAY. We reviewed FOLFOX and side effects and purpose of treatment. Consent process performed and signed by patient.\par \par 12/24/19: \par Colon CA: Patient here for toxicity evaluation s/p FOLFOX C1 on 12/16/2019.  She tolerated infusion well and developed no anaphylaxis reactions.  Denies fever, chills, mouth sores, neuropathy, diarrhea or weight loss.  CBC with diff performed on 12/23/2019 was unremarkable.\par Broken Tooth: Patient noticed over the weekend her R molar broke off.  She was seen by her dentist who performed a filling however states patient will require a root canal.  \par Left gingiva swelling: Noted after her first treatment.  She was seen by her dentist who rx a local anesthetic with excellent relief.\par Fatigue: Mild fatigue 2-3 days after her first treatment however abates during her off week.  She is able to perform her routine/instrumental ADLs without restrictions.\par \par 1/15/2020: \par Colon CA: Today is C3D3 FOLFOX. Due to neutropenia during C2, 5-FU IVP was discontinued.  Patient has notice less fatigue afterwards.  She is doing well on treatment and reports no major adverse events.  She has intermittent cold induce neuropathy on days of treatment and 2-3 days afterwards however abates.  She denies fever, chills, mouth sores, diarrhea or mouth sores.\par Social: Patient in excellent spirits today.  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 She notices that the neuropathy is totally gone in between treatments. However she must avoid cold entirely. She has been very active in teaching and work. She has been singing regularly and will go to [email or web address] in a week to sing. She went to [place] 2 weeks ago for ~ 4 days to visit her ill mother who is suffering from dementia. Overall, she is managing the psychological component of her treatment pretty well; and had relied on strategies from Dr. Moscoso, and support from friends. She feels relatively weak since the surgery and chemotherapy and is asking if she can participate in pilates classes and mild/moderate strengthening exercises, to which I supported.  She does not some left low back/flank pain in the AM and is concerned about her kidney.\par \par 3/11/2020:\par 1) Colon CA: C6 FOLFOX given on 3/4/2020.  Today her main complaint is fatigue.\par 2) Fatigue: Started on D4 of her last treatment.  Up until that point, she was doing extremely well.  On day 4-6 patient felt increase in fatigue.  She states she was in bed all day sleeping and is now fully recovering.  She is able to perform her routine/instrumental ADLs without any restrictions.  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   Patient has noticed she is pushing her self too much with working full time, taking care of her child and going to gym.  We have decided to cut back on work and see how patient responds after C7 of FOLFOX.\par 3) Anxiety: She currently works in [place] and is worried about her exposure to COVID-19.  She has decided to take 2-3 weeks off from work.\par \par 4/1/2020:\par 1) Colon CA: Today is C8 FOLFOX.\par 2) Neuropathy: Patient notices neuropathy located on her bilateral fingertips on days of treatment especially if she's exposed to cold temperatures.   Generally speaking patient wears mittens and notices if she is active the neuropathy tends to improve.  She denies neuropathy on her off week and denies it today.  She is able to functions and use her hands without any restrictions.\par 3) Social She is currently not working due to COVID-19 pandemic.\par 4) Left flank pain: Patient is complaining of intermittent lower back/flank pain that generally happens at night time.  She denies fever, chills, dysuria, hematuria or lifting heavy object.  She is concerned its her kidneys however her renal functions are WNL and last scan on 11/2019 demonstrated no abnormality in the kidneys.\par \par 5/6/2020:\par 1) Colon CA: Patient was scheduled for C10 FOLFOX today however due to thrombocytopenia, will delay treatment x one week.  She is feeling well today and denies fever, chills, diarrhea, rash, HFS or fatigue.  She underwent CAT chest, abdomen and pelvis on 5/5/2020 which demonstrated no recurrent or metastatic disease. \par 2) Neuropathy: Very mild neuropathy noted on her fingertips which is triggered by cold temperature.  Patient has noticed the neuropathy lasting on her off week.  It is intermittent and not too bothersome for patient.  She is able to use her hands without any restrictions.\par \par 7/28/2020: \par 1) Colon CA: Patient is here for evaluation after 12 cycles of chemotherapy. She tolerated treatment well and reports no significant adverse events. She has noted a pain or discomfort in the RLQ when passes stool through that area. Has been trying to increase fiber. It does depend on certain food, eg chocolate, certain breads and cheese. \par 2) Neuropathy: She is complaining of increasing neuropathy located on her fingertips and toes especially. Will continue to observe for improvement over time. She is using Accupuncture. \par \par 2/23/21\par Mammogram, breast US reviewed and are negative. Will repeat annually. \par Colonoscopy negative as per patient. Repeat in 3 years, and reviewed with patient\par Labs reviewed\par Neuropathy - improving in hands and slower in feet.\par Working for school as .\par Had both COVID vaccines - the last was on 2/4, just prior to surveillance CT scan and in the same arm as the mildly enlarged node in the axilla on left.\par \par 6/9/2021:\par 1) Colon CA: Patient is here for follow up for stage 3 colon CA.  She underwent CT chest, abdomen/pelvis on 6/3/2021 which demonstrated resolution of previously noted enlarged left axillary lymph nodes. No suspicious findings.\par We reviewed labs on 5/2021 which showed no acute abnormality.  CEA is WNL.\par She denies change in appetite, blood in stool, abdominal pain or change in bowel habits.\par Patient main complaint is bilateral eye twitching.\par As per patient she had 2 episodes of eye twitching and dizziness over the past 3 weeks.\par She denies fever, chills, change in mental status, headache, nausea, slurred speech or change in vision.\par She is resting properly however does admit to mild stress due to pandemic.\par She is currently on Levothyroxine 75 mcg, will check TSH levels today.\par 2) Social: Patient has no restrictions with her ADLs.  She is swimming three times/week.\par She is planning on travelling to [facility] towards the end of June 2021.\par  [de-identified] : adenocarcinoma [de-identified] : Surgeon: Yuval Haynes; [phone]\par PCP: Kimberly Melton; [phone]\par GI: Yuval Mendez; [phone]\par \par Patient Cell # [phone]\par \par

## 2021-06-10 LAB
25(OH)D3 SERPL-MCNC: 38 NG/ML
ALBUMIN SERPL ELPH-MCNC: 4.8 G/DL
ALP BLD-CCNC: 121 U/L
ALT SERPL-CCNC: 20 U/L
ANION GAP SERPL CALC-SCNC: 11 MMOL/L
AST SERPL-CCNC: 27 U/L
BILIRUB SERPL-MCNC: 0.4 MG/DL
BUN SERPL-MCNC: 14 MG/DL
CALCIUM SERPL-MCNC: 8.8 MG/DL
CHLORIDE SERPL-SCNC: 99 MMOL/L
CO2 SERPL-SCNC: 27 MMOL/L
CREAT SERPL-MCNC: 0.86 MG/DL
FOLATE SERPL-MCNC: 14.8 NG/ML
GLUCOSE SERPL-MCNC: 100 MG/DL
MAGNESIUM SERPL-MCNC: 2.4 MG/DL
POTASSIUM SERPL-SCNC: 4.2 MMOL/L
PROT SERPL-MCNC: 7 G/DL
SODIUM SERPL-SCNC: 136 MMOL/L
TSH SERPL-ACNC: 1.65 UIU/ML
VIT B12 SERPL-MCNC: 405 PG/ML

## 2021-07-17 ENCOUNTER — OUTPATIENT (OUTPATIENT)
Dept: OUTPATIENT SERVICES | Facility: HOSPITAL | Age: 61
LOS: 1 days | Discharge: HOME | End: 2021-07-17

## 2021-07-17 DIAGNOSIS — Z98.891 HISTORY OF UTERINE SCAR FROM PREVIOUS SURGERY: Chronic | ICD-10-CM

## 2021-07-17 DIAGNOSIS — Z98.890 OTHER SPECIFIED POSTPROCEDURAL STATES: Chronic | ICD-10-CM

## 2021-07-17 DIAGNOSIS — C18.9 MALIGNANT NEOPLASM OF COLON, UNSPECIFIED: ICD-10-CM

## 2021-07-20 ENCOUNTER — RESULT REVIEW (OUTPATIENT)
Age: 61
End: 2021-07-20

## 2021-07-20 ENCOUNTER — APPOINTMENT (OUTPATIENT)
Dept: INFUSION THERAPY | Facility: HOSPITAL | Age: 61
End: 2021-07-20

## 2021-07-20 ENCOUNTER — LABORATORY RESULT (OUTPATIENT)
Age: 61
End: 2021-07-20

## 2021-07-20 LAB
BASOPHILS # BLD AUTO: 0.04 K/UL — SIGNIFICANT CHANGE UP (ref 0–0.2)
BASOPHILS NFR BLD AUTO: 0.7 % — SIGNIFICANT CHANGE UP (ref 0–2)
EOSINOPHIL # BLD AUTO: 0.13 K/UL — SIGNIFICANT CHANGE UP (ref 0–0.5)
EOSINOPHIL NFR BLD AUTO: 2.3 % — SIGNIFICANT CHANGE UP (ref 0–6)
HCT VFR BLD CALC: 40.1 % — SIGNIFICANT CHANGE UP (ref 34.5–45)
HGB BLD-MCNC: 12.9 G/DL — SIGNIFICANT CHANGE UP (ref 11.5–15.5)
IMM GRANULOCYTES NFR BLD AUTO: 0.4 % — SIGNIFICANT CHANGE UP (ref 0–1.5)
LYMPHOCYTES # BLD AUTO: 1.33 K/UL — SIGNIFICANT CHANGE UP (ref 1–3.3)
LYMPHOCYTES # BLD AUTO: 23.9 % — SIGNIFICANT CHANGE UP (ref 13–44)
MCHC RBC-ENTMCNC: 28.9 PG — SIGNIFICANT CHANGE UP (ref 27–34)
MCHC RBC-ENTMCNC: 32.2 G/DL — SIGNIFICANT CHANGE UP (ref 32–36)
MCV RBC AUTO: 89.9 FL — SIGNIFICANT CHANGE UP (ref 80–100)
MONOCYTES # BLD AUTO: 0.43 K/UL — SIGNIFICANT CHANGE UP (ref 0–0.9)
MONOCYTES NFR BLD AUTO: 7.7 % — SIGNIFICANT CHANGE UP (ref 2–14)
NEUTROPHILS # BLD AUTO: 3.61 K/UL — SIGNIFICANT CHANGE UP (ref 1.8–7.4)
NEUTROPHILS NFR BLD AUTO: 65 % — SIGNIFICANT CHANGE UP (ref 43–77)
NRBC # BLD: 0 /100 WBCS — SIGNIFICANT CHANGE UP (ref 0–0)
PLATELET # BLD AUTO: 162 K/UL — SIGNIFICANT CHANGE UP (ref 150–400)
RBC # BLD: 4.46 M/UL — SIGNIFICANT CHANGE UP (ref 3.8–5.2)
RBC # FLD: 13.2 % — SIGNIFICANT CHANGE UP (ref 10.3–14.5)
WBC # BLD: 5.56 K/UL — SIGNIFICANT CHANGE UP (ref 3.8–10.5)
WBC # FLD AUTO: 5.56 K/UL — SIGNIFICANT CHANGE UP (ref 3.8–10.5)

## 2021-07-21 DIAGNOSIS — R11.2 NAUSEA WITH VOMITING, UNSPECIFIED: ICD-10-CM

## 2021-07-21 DIAGNOSIS — Z51.11 ENCOUNTER FOR ANTINEOPLASTIC CHEMOTHERAPY: ICD-10-CM

## 2021-09-18 ENCOUNTER — OUTPATIENT (OUTPATIENT)
Dept: OUTPATIENT SERVICES | Facility: HOSPITAL | Age: 61
LOS: 1 days | Discharge: HOME | End: 2021-09-18

## 2021-09-18 DIAGNOSIS — Z98.890 OTHER SPECIFIED POSTPROCEDURAL STATES: Chronic | ICD-10-CM

## 2021-09-18 DIAGNOSIS — Z98.891 HISTORY OF UTERINE SCAR FROM PREVIOUS SURGERY: Chronic | ICD-10-CM

## 2021-09-18 DIAGNOSIS — C18.9 MALIGNANT NEOPLASM OF COLON, UNSPECIFIED: ICD-10-CM

## 2021-09-21 ENCOUNTER — LABORATORY RESULT (OUTPATIENT)
Age: 61
End: 2021-09-21

## 2021-09-21 ENCOUNTER — RESULT REVIEW (OUTPATIENT)
Age: 61
End: 2021-09-21

## 2021-09-21 ENCOUNTER — APPOINTMENT (OUTPATIENT)
Dept: INFUSION THERAPY | Facility: HOSPITAL | Age: 61
End: 2021-09-21

## 2021-09-21 LAB
BASOPHILS # BLD AUTO: 0.04 K/UL — SIGNIFICANT CHANGE UP (ref 0–0.2)
BASOPHILS NFR BLD AUTO: 0.8 % — SIGNIFICANT CHANGE UP (ref 0–2)
EOSINOPHIL # BLD AUTO: 0.09 K/UL — SIGNIFICANT CHANGE UP (ref 0–0.5)
EOSINOPHIL NFR BLD AUTO: 1.9 % — SIGNIFICANT CHANGE UP (ref 0–6)
HCT VFR BLD CALC: 38.7 % — SIGNIFICANT CHANGE UP (ref 34.5–45)
HGB BLD-MCNC: 12.7 G/DL — SIGNIFICANT CHANGE UP (ref 11.5–15.5)
IMM GRANULOCYTES NFR BLD AUTO: 0.2 % — SIGNIFICANT CHANGE UP (ref 0–1.5)
LYMPHOCYTES # BLD AUTO: 1.51 K/UL — SIGNIFICANT CHANGE UP (ref 1–3.3)
LYMPHOCYTES # BLD AUTO: 31.1 % — SIGNIFICANT CHANGE UP (ref 13–44)
MCHC RBC-ENTMCNC: 28.9 PG — SIGNIFICANT CHANGE UP (ref 27–34)
MCHC RBC-ENTMCNC: 32.8 G/DL — SIGNIFICANT CHANGE UP (ref 32–36)
MCV RBC AUTO: 88.2 FL — SIGNIFICANT CHANGE UP (ref 80–100)
MONOCYTES # BLD AUTO: 0.32 K/UL — SIGNIFICANT CHANGE UP (ref 0–0.9)
MONOCYTES NFR BLD AUTO: 6.6 % — SIGNIFICANT CHANGE UP (ref 2–14)
NEUTROPHILS # BLD AUTO: 2.88 K/UL — SIGNIFICANT CHANGE UP (ref 1.8–7.4)
NEUTROPHILS NFR BLD AUTO: 59.4 % — SIGNIFICANT CHANGE UP (ref 43–77)
NRBC # BLD: 0 /100 WBCS — SIGNIFICANT CHANGE UP (ref 0–0)
PLATELET # BLD AUTO: 154 K/UL — SIGNIFICANT CHANGE UP (ref 150–400)
RBC # BLD: 4.39 M/UL — SIGNIFICANT CHANGE UP (ref 3.8–5.2)
RBC # FLD: 12.9 % — SIGNIFICANT CHANGE UP (ref 10.3–14.5)
WBC # BLD: 4.85 K/UL — SIGNIFICANT CHANGE UP (ref 3.8–10.5)
WBC # FLD AUTO: 4.85 K/UL — SIGNIFICANT CHANGE UP (ref 3.8–10.5)

## 2021-10-14 ENCOUNTER — RESULT REVIEW (OUTPATIENT)
Age: 61
End: 2021-10-14

## 2021-10-19 ENCOUNTER — APPOINTMENT (OUTPATIENT)
Dept: ULTRASOUND IMAGING | Facility: IMAGING CENTER | Age: 61
End: 2021-10-19
Payer: COMMERCIAL

## 2021-10-19 ENCOUNTER — APPOINTMENT (OUTPATIENT)
Dept: MAMMOGRAPHY | Facility: IMAGING CENTER | Age: 61
End: 2021-10-19
Payer: COMMERCIAL

## 2021-10-19 ENCOUNTER — NON-APPOINTMENT (OUTPATIENT)
Age: 61
End: 2021-10-19

## 2021-10-19 ENCOUNTER — OUTPATIENT (OUTPATIENT)
Dept: OUTPATIENT SERVICES | Facility: HOSPITAL | Age: 61
LOS: 1 days | End: 2021-10-19
Payer: COMMERCIAL

## 2021-10-19 ENCOUNTER — APPOINTMENT (OUTPATIENT)
Dept: RADIOLOGY | Facility: IMAGING CENTER | Age: 61
End: 2021-10-19
Payer: COMMERCIAL

## 2021-10-19 ENCOUNTER — APPOINTMENT (OUTPATIENT)
Dept: OPHTHALMOLOGY | Facility: CLINIC | Age: 61
End: 2021-10-19
Payer: COMMERCIAL

## 2021-10-19 DIAGNOSIS — Z98.891 HISTORY OF UTERINE SCAR FROM PREVIOUS SURGERY: Chronic | ICD-10-CM

## 2021-10-19 DIAGNOSIS — Z98.890 OTHER SPECIFIED POSTPROCEDURAL STATES: Chronic | ICD-10-CM

## 2021-10-19 DIAGNOSIS — Z00.8 ENCOUNTER FOR OTHER GENERAL EXAMINATION: ICD-10-CM

## 2021-10-19 PROCEDURE — 76641 ULTRASOUND BREAST COMPLETE: CPT | Mod: 26,50

## 2021-10-19 PROCEDURE — 77067 SCR MAMMO BI INCL CAD: CPT

## 2021-10-19 PROCEDURE — 77063 BREAST TOMOSYNTHESIS BI: CPT | Mod: 26

## 2021-10-19 PROCEDURE — 72110 X-RAY EXAM L-2 SPINE 4/>VWS: CPT | Mod: 26

## 2021-10-19 PROCEDURE — 72110 X-RAY EXAM L-2 SPINE 4/>VWS: CPT

## 2021-10-19 PROCEDURE — 77063 BREAST TOMOSYNTHESIS BI: CPT

## 2021-10-19 PROCEDURE — 77067 SCR MAMMO BI INCL CAD: CPT | Mod: 26

## 2021-10-19 PROCEDURE — 76641 ULTRASOUND BREAST COMPLETE: CPT

## 2021-10-19 PROCEDURE — 92012 INTRM OPH EXAM EST PATIENT: CPT

## 2021-10-26 ENCOUNTER — APPOINTMENT (OUTPATIENT)
Dept: CT IMAGING | Facility: IMAGING CENTER | Age: 61
End: 2021-10-26
Payer: COMMERCIAL

## 2021-10-26 ENCOUNTER — RESULT REVIEW (OUTPATIENT)
Age: 61
End: 2021-10-26

## 2021-10-26 ENCOUNTER — OUTPATIENT (OUTPATIENT)
Dept: OUTPATIENT SERVICES | Facility: HOSPITAL | Age: 61
LOS: 1 days | End: 2021-10-26
Payer: COMMERCIAL

## 2021-10-26 DIAGNOSIS — C18.1 MALIGNANT NEOPLASM OF APPENDIX: ICD-10-CM

## 2021-10-26 DIAGNOSIS — Z00.8 ENCOUNTER FOR OTHER GENERAL EXAMINATION: ICD-10-CM

## 2021-10-26 DIAGNOSIS — Z98.890 OTHER SPECIFIED POSTPROCEDURAL STATES: Chronic | ICD-10-CM

## 2021-10-26 DIAGNOSIS — Z98.891 HISTORY OF UTERINE SCAR FROM PREVIOUS SURGERY: Chronic | ICD-10-CM

## 2021-10-26 PROCEDURE — 74177 CT ABD & PELVIS W/CONTRAST: CPT | Mod: 26

## 2021-10-26 PROCEDURE — 71260 CT THORAX DX C+: CPT

## 2021-10-26 PROCEDURE — 71260 CT THORAX DX C+: CPT | Mod: 26

## 2021-10-26 PROCEDURE — 74177 CT ABD & PELVIS W/CONTRAST: CPT

## 2021-11-02 ENCOUNTER — NON-APPOINTMENT (OUTPATIENT)
Age: 61
End: 2021-11-02

## 2021-11-02 ENCOUNTER — APPOINTMENT (OUTPATIENT)
Dept: OPHTHALMOLOGY | Facility: CLINIC | Age: 61
End: 2021-11-02
Payer: COMMERCIAL

## 2021-11-02 PROCEDURE — 92250 FUNDUS PHOTOGRAPHY W/I&R: CPT

## 2021-11-02 PROCEDURE — 92014 COMPRE OPH EXAM EST PT 1/>: CPT

## 2021-11-03 ENCOUNTER — OUTPATIENT (OUTPATIENT)
Dept: OUTPATIENT SERVICES | Facility: HOSPITAL | Age: 61
LOS: 1 days | Discharge: HOME | End: 2021-11-03

## 2021-11-03 DIAGNOSIS — Z98.890 OTHER SPECIFIED POSTPROCEDURAL STATES: Chronic | ICD-10-CM

## 2021-11-03 DIAGNOSIS — C18.9 MALIGNANT NEOPLASM OF COLON, UNSPECIFIED: ICD-10-CM

## 2021-11-03 DIAGNOSIS — Z98.891 HISTORY OF UTERINE SCAR FROM PREVIOUS SURGERY: Chronic | ICD-10-CM

## 2021-11-05 ENCOUNTER — APPOINTMENT (OUTPATIENT)
Dept: HEMATOLOGY ONCOLOGY | Facility: CLINIC | Age: 61
End: 2021-11-05

## 2021-11-10 ENCOUNTER — APPOINTMENT (OUTPATIENT)
Dept: HEMATOLOGY ONCOLOGY | Facility: CLINIC | Age: 61
End: 2021-11-10
Payer: COMMERCIAL

## 2021-11-10 PROCEDURE — 99213 OFFICE O/P EST LOW 20 MIN: CPT | Mod: 95

## 2021-11-10 NOTE — HISTORY OF PRESENT ILLNESS
[Disease: _____________________] : Disease: [unfilled] [T: ___] : T[unfilled] [N: ___] : N[unfilled] [M: ___] : M[unfilled] [AJCC Stage: ____] : AJCC Stage: [unfilled] [Home] : at home, [unfilled] , at the time of the visit. [Medical Office: ([facility])___] : at the medical office located in  [Verbal consent obtained from patient] : the patient, [unfilled] [de-identified] : Ms. Rutherford is a 58 year old female with past medical history of Graves disease presenting to the office for an initial consultation of colon CA.\par \par Patient c/o right LQ pain with occasional bloating, denies change in bowel habits for several months. Had a colonoscopy in 2012 that is self-reported as normal. 2017 pelvic sono was negative. \par \par She was seen by GI in late 10/2019.  CAT abdomen with contrast ordered which revealed cecal mass with enlarged LNs.  F/U colonoscopy revealed sessile SC polyp which was biopsied (hyperplastic) and a malignant appearing mass involving IC valve/cecum.\par Pathology: adenocarcinoma\par \par Patient underwent right hemicolectomy on 11/11/2019 with Dr. 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  CAT chest, abdomen and pelvis performed on 12/30/2019 demonstrated no evidence of metastatic disease.  Mediport placed on 12/3/2019.  Today her main complaint is cough which started on 12/4/2019.  Cough is productive FOR PAST DAY. We reviewed FOLFOX and side effects and purpose of treatment. Consent process performed and signed by patient.\par \par 12/24/19: \par Colon CA: Patient here for toxicity evaluation s/p FOLFOX C1 on 12/16/2019.  She tolerated infusion well and developed no anaphylaxis reactions.  Denies fever, chills, mouth sores, neuropathy, diarrhea or weight loss.  CBC with diff performed on 12/23/2019 was unremarkable.\par Broken Tooth: Patient noticed over the weekend her R molar broke off.  She was seen by her dentist who performed a filling however states patient will require a root canal.  \par Left gingiva swelling: Noted after her first treatment.  She was seen by her dentist who rx a local anesthetic with excellent relief.\par Fatigue: Mild fatigue 2-3 days after her first treatment however abates during her off week.  She is able to perform her routine/instrumental ADLs without restrictions.\par \par 1/15/2020: \par Colon CA: Today is C3D3 FOLFOX. Due to neutropenia during C2, 5-FU IVP was discontinued.  Patient has notice less fatigue afterwards.  She is doing well on treatment and reports no major adverse events.  She has intermittent cold induce neuropathy on days of treatment and 2-3 days afterwards however abates.  She denies fever, chills, mouth sores, diarrhea or mouth sores.\par Social: Patient in excellent spirits today.  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 She notices that the neuropathy is totally gone in between treatments. However she must avoid cold entirely. She has been very active in teaching and work. She has been singing regularly and will go to [employer] in a week to sing. She went to [place] 2 weeks ago for ~ 4 days to visit her ill mother who is suffering from dementia. Overall, she is managing the psychological component of her treatment pretty well; and had relied on strategies from Dr. Moscoso, and support from friends. She feels relatively weak since the surgery and chemotherapy and is asking if she can participate in pilates classes and mild/moderate strengthening exercises, to which I supported.  She does not some left low back/flank pain in the AM and is concerned about her kidney.\par \par 3/11/2020:\par 1) Colon CA: C6 FOLFOX given on 3/4/2020.  Today her main complaint is fatigue.\par 2) Fatigue: Started on D4 of her last treatment.  Up until that point, she was doing extremely well.  On day 4-6 patient felt increase in fatigue.  She states she was in bed all day sleeping and is now fully recovering.  She is able to perform her routine/instrumental ADLs without any restrictions.  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   Patient has noticed she is pushing her self too much with working full time, taking care of her child and going to gym.  We have decided to cut back on work and see how patient responds after C7 of FOLFOX.\par 3) Anxiety: She currently works in [place] and is worried about her exposure to COVID-19.  She has decided to take 2-3 weeks off from work.\par \par 4/1/2020:\par 1) Colon CA: Today is C8 FOLFOX.\par 2) Neuropathy: Patient notices neuropathy located on her bilateral fingertips on days of treatment especially if she's exposed to cold temperatures.   Generally speaking patient wears mittens and notices if she is active the neuropathy tends to improve.  She denies neuropathy on her off week and denies it today.  She is able to functions and use her hands without any restrictions.\par 3) Social She is currently not working due to COVID-19 pandemic.\par 4) Left flank pain: Patient is complaining of intermittent lower back/flank pain that generally happens at night time.  She denies fever, chills, dysuria, hematuria or lifting heavy object.  She is concerned its her kidneys however her renal functions are WNL and last scan on 11/2019 demonstrated no abnormality in the kidneys.\par \par 5/6/2020:\par 1) Colon CA: Patient was scheduled for C10 FOLFOX today however due to thrombocytopenia, will delay treatment x one week.  She is feeling well today and denies fever, chills, diarrhea, rash, HFS or fatigue.  She underwent CAT chest, abdomen and pelvis on 5/5/2020 which demonstrated no recurrent or metastatic disease. \par 2) Neuropathy: Very mild neuropathy noted on her fingertips which is triggered by cold temperature.  Patient has noticed the neuropathy lasting on her off week.  It is intermittent and not too bothersome for patient.  She is able to use her hands without any restrictions.\par \par 7/28/2020: \par 1) Colon CA: Patient is here for evaluation after 12 cycles of chemotherapy. She tolerated treatment well and reports no significant adverse events. She has noted a pain or discomfort in the RLQ when passes stool through that area. Has been trying to increase fiber. It does depend on certain food, eg chocolate, certain breads and cheese. \par 2) Neuropathy: She is complaining of increasing neuropathy located on her fingertips and toes especially. Will continue to observe for improvement over time. She is using Accupuncture. \par \par 2/23/21\par Mammogram, breast US reviewed and are negative. Will repeat annually. \par Colonoscopy negative as per patient. Repeat in 3 years, and reviewed with patient\par Labs reviewed\par Neuropathy - improving in hands and slower in feet.\par Working for school as .\par Had both COVID vaccines - the last was on 2/4, just prior to surveillance CT scan and in the same arm as the mildly enlarged node in the axilla on left.\par \par 6/9/2021:\par 1) Colon CA: Patient is here for follow up for stage 3 colon CA.  She underwent CT chest, abdomen/pelvis on 6/3/2021 which demonstrated resolution of previously noted enlarged left axillary lymph nodes. No suspicious findings.\par We reviewed labs on 5/2021 which showed no acute abnormality.  CEA is WNL.\par She denies change in appetite, blood in stool, abdominal pain or change in bowel habits.\par Patient main complaint is bilateral eye twitching.\par As per patient she had 2 episodes of eye twitching and dizziness over the past 3 weeks.\par She denies fever, chills, change in mental status, headache, nausea, slurred speech or change in vision.\par She is resting properly however does admit to mild stress due to pandemic.\par She is currently on Levothyroxine 75 mcg, will check TSH levels today.\par 2) Social: Patient has no restrictions with her ADLs.  She is swimming three times/week.\par She is planning on travelling to [facility] towards the end of June 2021.\par \par 11/10/2021: TEB\par 1) Colon CA: Patient underwent CAT chest, abdomen/pelvis on 10/2021 which revealed no evidence of metastatic disease.\par Her labs on 9/2021 were unremarkable.  \par Her last port flush was on 10/2021.  She was advised to make q 6-8 weeks.\par She is feeling relative well and voices no new complaints.  Denies fatigue, weight loss, blood in stool or abdominal pain.\par 2) Eye lesion: Patient went for routine checkup with her ophthalmologist (Dr. Jessenia Mcneil) and was found to have a lesion on her left eye.\par She has an appointment on Wednesday (11/17/2021) with an ophthalmologist oncologist, Dr. Annetta Vaz ([facility]par 3) Right ear tinnitus: She is being seen by ENT and was recommended to undergo MRI head however was unable to undergo.\par 4) Social: Patient received Pfizer booster vaccine.\par She is working full time. [de-identified] : adenocarcinoma [de-identified] : Surgeon: Yuval Haynes; [phone]\par PCP: Kimberly Melton; [phone]\par GI: Yuval Mendez; [phone]\par \par Patient Cell # [phone]\par \par

## 2021-11-10 NOTE — REASON FOR VISIT
[Follow-Up Visit] : a follow-up [Other Location: e.g. School (Enter Location, City,State)___] : at [unfilled], at the time of the visit. [Medical Office: ([facility])___] : at the medical office located in  [Verbal consent obtained from patient] : the patient, [unfilled] [FreeTextEntry2] : Colon CA

## 2021-11-27 ENCOUNTER — RESULT REVIEW (OUTPATIENT)
Age: 61
End: 2021-11-27

## 2021-11-27 ENCOUNTER — LABORATORY RESULT (OUTPATIENT)
Age: 61
End: 2021-11-27

## 2021-11-27 ENCOUNTER — APPOINTMENT (OUTPATIENT)
Dept: INFUSION THERAPY | Facility: HOSPITAL | Age: 61
End: 2021-11-27

## 2021-11-27 LAB
BASOPHILS # BLD AUTO: 0.04 K/UL — SIGNIFICANT CHANGE UP (ref 0–0.2)
BASOPHILS NFR BLD AUTO: 0.6 % — SIGNIFICANT CHANGE UP (ref 0–2)
EOSINOPHIL # BLD AUTO: 0.05 K/UL — SIGNIFICANT CHANGE UP (ref 0–0.5)
EOSINOPHIL NFR BLD AUTO: 0.8 % — SIGNIFICANT CHANGE UP (ref 0–6)
HCT VFR BLD CALC: 40.3 % — SIGNIFICANT CHANGE UP (ref 34.5–45)
HGB BLD-MCNC: 13.2 G/DL — SIGNIFICANT CHANGE UP (ref 11.5–15.5)
IMM GRANULOCYTES NFR BLD AUTO: 0.3 % — SIGNIFICANT CHANGE UP (ref 0–1.5)
LYMPHOCYTES # BLD AUTO: 1.11 K/UL — SIGNIFICANT CHANGE UP (ref 1–3.3)
LYMPHOCYTES # BLD AUTO: 17 % — SIGNIFICANT CHANGE UP (ref 13–44)
MCHC RBC-ENTMCNC: 29.1 PG — SIGNIFICANT CHANGE UP (ref 27–34)
MCHC RBC-ENTMCNC: 32.8 G/DL — SIGNIFICANT CHANGE UP (ref 32–36)
MCV RBC AUTO: 88.8 FL — SIGNIFICANT CHANGE UP (ref 80–100)
MONOCYTES # BLD AUTO: 0.38 K/UL — SIGNIFICANT CHANGE UP (ref 0–0.9)
MONOCYTES NFR BLD AUTO: 5.8 % — SIGNIFICANT CHANGE UP (ref 2–14)
NEUTROPHILS # BLD AUTO: 4.92 K/UL — SIGNIFICANT CHANGE UP (ref 1.8–7.4)
NEUTROPHILS NFR BLD AUTO: 75.5 % — SIGNIFICANT CHANGE UP (ref 43–77)
NRBC # BLD: 0 /100 WBCS — SIGNIFICANT CHANGE UP (ref 0–0)
PLATELET # BLD AUTO: 167 K/UL — SIGNIFICANT CHANGE UP (ref 150–400)
RBC # BLD: 4.54 M/UL — SIGNIFICANT CHANGE UP (ref 3.8–5.2)
RBC # FLD: 13.1 % — SIGNIFICANT CHANGE UP (ref 10.3–14.5)
WBC # BLD: 6.52 K/UL — SIGNIFICANT CHANGE UP (ref 3.8–10.5)
WBC # FLD AUTO: 6.52 K/UL — SIGNIFICANT CHANGE UP (ref 3.8–10.5)

## 2021-12-28 ENCOUNTER — RX RENEWAL (OUTPATIENT)
Age: 61
End: 2021-12-28

## 2022-01-28 ENCOUNTER — OUTPATIENT (OUTPATIENT)
Dept: OUTPATIENT SERVICES | Facility: HOSPITAL | Age: 62
LOS: 1 days | Discharge: HOME | End: 2022-01-28

## 2022-01-28 DIAGNOSIS — Z98.890 OTHER SPECIFIED POSTPROCEDURAL STATES: Chronic | ICD-10-CM

## 2022-01-28 DIAGNOSIS — C18.9 MALIGNANT NEOPLASM OF COLON, UNSPECIFIED: ICD-10-CM

## 2022-01-28 DIAGNOSIS — Z98.891 HISTORY OF UTERINE SCAR FROM PREVIOUS SURGERY: Chronic | ICD-10-CM

## 2022-01-31 ENCOUNTER — APPOINTMENT (OUTPATIENT)
Dept: INFUSION THERAPY | Facility: HOSPITAL | Age: 62
End: 2022-01-31

## 2022-01-31 ENCOUNTER — RESULT REVIEW (OUTPATIENT)
Age: 62
End: 2022-01-31

## 2022-01-31 LAB
ALBUMIN SERPL ELPH-MCNC: 4.2 G/DL — SIGNIFICANT CHANGE UP (ref 3.3–5)
ALP SERPL-CCNC: 114 U/L — SIGNIFICANT CHANGE UP (ref 40–120)
ALT FLD-CCNC: 15 U/L — SIGNIFICANT CHANGE UP (ref 10–45)
ANION GAP SERPL CALC-SCNC: 12 MMOL/L — SIGNIFICANT CHANGE UP (ref 5–17)
AST SERPL-CCNC: 26 U/L — SIGNIFICANT CHANGE UP (ref 10–40)
BILIRUB SERPL-MCNC: 0.3 MG/DL — SIGNIFICANT CHANGE UP (ref 0.2–1.2)
BUN SERPL-MCNC: 15 MG/DL — SIGNIFICANT CHANGE UP (ref 7–23)
CALCIUM SERPL-MCNC: 8.2 MG/DL — LOW (ref 8.4–10.5)
CHLORIDE SERPL-SCNC: 100 MMOL/L — SIGNIFICANT CHANGE UP (ref 96–108)
CO2 SERPL-SCNC: 24 MMOL/L — SIGNIFICANT CHANGE UP (ref 22–31)
CREAT SERPL-MCNC: 0.68 MG/DL — SIGNIFICANT CHANGE UP (ref 0.5–1.3)
GLUCOSE SERPL-MCNC: 103 MG/DL — HIGH (ref 70–99)
HCT VFR BLD CALC: 40.5 % — SIGNIFICANT CHANGE UP (ref 34.5–45)
HGB BLD-MCNC: 13 G/DL — SIGNIFICANT CHANGE UP (ref 11.5–15.5)
MCHC RBC-ENTMCNC: 29 PG — SIGNIFICANT CHANGE UP (ref 27–34)
MCHC RBC-ENTMCNC: 32.1 G/DL — SIGNIFICANT CHANGE UP (ref 32–36)
MCV RBC AUTO: 90.4 FL — SIGNIFICANT CHANGE UP (ref 80–100)
PLATELET # BLD AUTO: 165 K/UL — SIGNIFICANT CHANGE UP (ref 150–400)
POTASSIUM SERPL-MCNC: 4 MMOL/L — SIGNIFICANT CHANGE UP (ref 3.5–5.3)
POTASSIUM SERPL-SCNC: 4 MMOL/L — SIGNIFICANT CHANGE UP (ref 3.5–5.3)
PROT SERPL-MCNC: 6.5 G/DL — SIGNIFICANT CHANGE UP (ref 6–8.3)
RBC # BLD: 4.48 M/UL — SIGNIFICANT CHANGE UP (ref 3.8–5.2)
RBC # FLD: 12.8 % — SIGNIFICANT CHANGE UP (ref 10.3–14.5)
SODIUM SERPL-SCNC: 136 MMOL/L — SIGNIFICANT CHANGE UP (ref 135–145)
WBC # BLD: 6.76 K/UL — SIGNIFICANT CHANGE UP (ref 3.8–10.5)
WBC # FLD AUTO: 6.76 K/UL — SIGNIFICANT CHANGE UP (ref 3.8–10.5)

## 2022-02-01 LAB — CEA SERPL-MCNC: 0.8 NG/ML — SIGNIFICANT CHANGE UP (ref 0–3.8)

## 2022-02-03 ENCOUNTER — NON-APPOINTMENT (OUTPATIENT)
Age: 62
End: 2022-02-03

## 2022-03-26 ENCOUNTER — OUTPATIENT (OUTPATIENT)
Dept: OUTPATIENT SERVICES | Facility: HOSPITAL | Age: 62
LOS: 1 days | End: 2022-03-26
Payer: COMMERCIAL

## 2022-03-26 ENCOUNTER — APPOINTMENT (OUTPATIENT)
Dept: CT IMAGING | Facility: IMAGING CENTER | Age: 62
End: 2022-03-26
Payer: COMMERCIAL

## 2022-03-26 DIAGNOSIS — Z98.890 OTHER SPECIFIED POSTPROCEDURAL STATES: Chronic | ICD-10-CM

## 2022-03-26 DIAGNOSIS — C18.1 MALIGNANT NEOPLASM OF APPENDIX: ICD-10-CM

## 2022-03-26 DIAGNOSIS — Z98.891 HISTORY OF UTERINE SCAR FROM PREVIOUS SURGERY: Chronic | ICD-10-CM

## 2022-03-26 PROCEDURE — 71260 CT THORAX DX C+: CPT | Mod: 26

## 2022-03-26 PROCEDURE — 71260 CT THORAX DX C+: CPT

## 2022-03-26 PROCEDURE — 74177 CT ABD & PELVIS W/CONTRAST: CPT | Mod: 26

## 2022-03-26 PROCEDURE — 74177 CT ABD & PELVIS W/CONTRAST: CPT

## 2022-04-05 NOTE — H&P PST ADULT - HISTORY OF PRESENT ILLNESS
Abdomen , soft, nontender, nondistended , no guarding or rigidity , no masses palpable , normal bowel sounds , Liver and Spleen , no hepatomegaly present , no hepatosplenomegaly , liver nontender , spleen not palpable
58 year old female with right side pain and discomfort had been referred for CT mass noted right colon had colonoscopy with biopsy + for colon cancer referred for surgery.

## 2022-04-14 ENCOUNTER — OUTPATIENT (OUTPATIENT)
Dept: OUTPATIENT SERVICES | Facility: HOSPITAL | Age: 62
LOS: 1 days | Discharge: HOME | End: 2022-04-14

## 2022-04-14 DIAGNOSIS — Z98.891 HISTORY OF UTERINE SCAR FROM PREVIOUS SURGERY: Chronic | ICD-10-CM

## 2022-04-14 DIAGNOSIS — Z98.890 OTHER SPECIFIED POSTPROCEDURAL STATES: Chronic | ICD-10-CM

## 2022-04-14 DIAGNOSIS — C18.9 MALIGNANT NEOPLASM OF COLON, UNSPECIFIED: ICD-10-CM

## 2022-04-18 ENCOUNTER — RESULT REVIEW (OUTPATIENT)
Age: 62
End: 2022-04-18

## 2022-04-18 ENCOUNTER — APPOINTMENT (OUTPATIENT)
Dept: INFUSION THERAPY | Facility: HOSPITAL | Age: 62
End: 2022-04-18

## 2022-04-18 LAB
ALBUMIN SERPL ELPH-MCNC: 4.1 G/DL — SIGNIFICANT CHANGE UP (ref 3.3–5)
ALP SERPL-CCNC: 100 U/L — SIGNIFICANT CHANGE UP (ref 40–120)
ALT FLD-CCNC: 33 U/L — SIGNIFICANT CHANGE UP (ref 10–45)
ANION GAP SERPL CALC-SCNC: 11 MMOL/L — SIGNIFICANT CHANGE UP (ref 5–17)
AST SERPL-CCNC: 31 U/L — SIGNIFICANT CHANGE UP (ref 10–40)
BILIRUB SERPL-MCNC: 0.5 MG/DL — SIGNIFICANT CHANGE UP (ref 0.2–1.2)
BUN SERPL-MCNC: 15 MG/DL — SIGNIFICANT CHANGE UP (ref 7–23)
CALCIUM SERPL-MCNC: 8.7 MG/DL — SIGNIFICANT CHANGE UP (ref 8.4–10.5)
CEA SERPL-MCNC: 0.9 NG/ML — SIGNIFICANT CHANGE UP (ref 0–3.8)
CHLORIDE SERPL-SCNC: 100 MMOL/L — SIGNIFICANT CHANGE UP (ref 96–108)
CO2 SERPL-SCNC: 24 MMOL/L — SIGNIFICANT CHANGE UP (ref 22–31)
CREAT SERPL-MCNC: 0.75 MG/DL — SIGNIFICANT CHANGE UP (ref 0.5–1.3)
EGFR: 91 ML/MIN/1.73M2 — SIGNIFICANT CHANGE UP
GLUCOSE SERPL-MCNC: 80 MG/DL — SIGNIFICANT CHANGE UP (ref 70–99)
HCT VFR BLD CALC: 40.7 % — SIGNIFICANT CHANGE UP (ref 34.5–45)
HGB BLD-MCNC: 13.5 G/DL — SIGNIFICANT CHANGE UP (ref 11.5–15.5)
MCHC RBC-ENTMCNC: 29.3 PG — SIGNIFICANT CHANGE UP (ref 27–34)
MCHC RBC-ENTMCNC: 33.2 G/DL — SIGNIFICANT CHANGE UP (ref 32–36)
MCV RBC AUTO: 88.3 FL — SIGNIFICANT CHANGE UP (ref 80–100)
PLATELET # BLD AUTO: 159 K/UL — SIGNIFICANT CHANGE UP (ref 150–400)
POTASSIUM SERPL-MCNC: 4.5 MMOL/L — SIGNIFICANT CHANGE UP (ref 3.5–5.3)
POTASSIUM SERPL-SCNC: 4.5 MMOL/L — SIGNIFICANT CHANGE UP (ref 3.5–5.3)
PROT SERPL-MCNC: 6.4 G/DL — SIGNIFICANT CHANGE UP (ref 6–8.3)
RBC # BLD: 4.61 M/UL — SIGNIFICANT CHANGE UP (ref 3.8–5.2)
RBC # FLD: 12.9 % — SIGNIFICANT CHANGE UP (ref 10.3–14.5)
SODIUM SERPL-SCNC: 136 MMOL/L — SIGNIFICANT CHANGE UP (ref 135–145)
WBC # BLD: 5.13 K/UL — SIGNIFICANT CHANGE UP (ref 3.8–10.5)
WBC # FLD AUTO: 5.13 K/UL — SIGNIFICANT CHANGE UP (ref 3.8–10.5)

## 2022-04-19 DIAGNOSIS — R11.2 NAUSEA WITH VOMITING, UNSPECIFIED: ICD-10-CM

## 2022-04-19 DIAGNOSIS — Z51.11 ENCOUNTER FOR ANTINEOPLASTIC CHEMOTHERAPY: ICD-10-CM

## 2022-04-25 ENCOUNTER — APPOINTMENT (OUTPATIENT)
Dept: ULTRASOUND IMAGING | Facility: IMAGING CENTER | Age: 62
End: 2022-04-25
Payer: COMMERCIAL

## 2022-04-25 ENCOUNTER — RESULT REVIEW (OUTPATIENT)
Age: 62
End: 2022-04-25

## 2022-04-25 ENCOUNTER — OUTPATIENT (OUTPATIENT)
Dept: OUTPATIENT SERVICES | Facility: HOSPITAL | Age: 62
LOS: 1 days | End: 2022-04-25
Payer: COMMERCIAL

## 2022-04-25 DIAGNOSIS — Z98.890 OTHER SPECIFIED POSTPROCEDURAL STATES: Chronic | ICD-10-CM

## 2022-04-25 DIAGNOSIS — C18.1 MALIGNANT NEOPLASM OF APPENDIX: ICD-10-CM

## 2022-04-25 DIAGNOSIS — Z98.891 HISTORY OF UTERINE SCAR FROM PREVIOUS SURGERY: Chronic | ICD-10-CM

## 2022-04-25 PROCEDURE — 76830 TRANSVAGINAL US NON-OB: CPT

## 2022-04-25 PROCEDURE — 76856 US EXAM PELVIC COMPLETE: CPT

## 2022-04-25 PROCEDURE — 76830 TRANSVAGINAL US NON-OB: CPT | Mod: 26

## 2022-04-25 PROCEDURE — 76856 US EXAM PELVIC COMPLETE: CPT | Mod: 26

## 2022-04-28 ENCOUNTER — APPOINTMENT (OUTPATIENT)
Dept: HEMATOLOGY ONCOLOGY | Facility: CLINIC | Age: 62
End: 2022-04-28
Payer: COMMERCIAL

## 2022-04-28 VITALS
RESPIRATION RATE: 16 BRPM | DIASTOLIC BLOOD PRESSURE: 85 MMHG | WEIGHT: 125.22 LBS | HEART RATE: 64 BPM | SYSTOLIC BLOOD PRESSURE: 128 MMHG | HEIGHT: 67 IN | BODY MASS INDEX: 19.65 KG/M2 | OXYGEN SATURATION: 99 % | TEMPERATURE: 98.1 F

## 2022-04-28 DIAGNOSIS — M54.31 SCIATICA, RIGHT SIDE: ICD-10-CM

## 2022-04-28 PROCEDURE — 99214 OFFICE O/P EST MOD 30 MIN: CPT

## 2022-04-28 NOTE — REASON FOR VISIT
[Follow-Up Visit] : a follow-up [Other Location: e.g. School (Enter Location, City,State)___] : at [unfilled], at the time of the visit. [Medical Office: ([facility])___] : at the medical office located in  [Verbal consent obtained from patient] : the patient, [unfilled] [FreeTextEntry2] : Colon CA

## 2022-04-28 NOTE — HISTORY OF PRESENT ILLNESS
[Disease: _____________________] : Disease: [unfilled] [T: ___] : T[unfilled] [N: ___] : N[unfilled] [M: ___] : M[unfilled] [AJCC Stage: ____] : AJCC Stage: [unfilled] [Home] : at home, [unfilled] , at the time of the visit. [Medical Office: ([facility])___] : at the medical office located in  [Verbal consent obtained from patient] : the patient, [unfilled] [de-identified] : Ms. Rutherford is a 58 year old female with past medical history of Graves disease presenting to the office for an initial consultation of colon CA.\par \par Patient c/o right LQ pain with occasional bloating, denies change in bowel habits for several months. Had a colonoscopy in 2012 that is self-reported as normal. 2017 pelvic sono was negative. \par \par She was seen by GI in late 10/2019.  CAT abdomen with contrast ordered which revealed cecal mass with enlarged LNs.  F/U colonoscopy revealed sessile SC polyp which was biopsied (hyperplastic) and a malignant appearing mass involving IC valve/cecum.\par Pathology: adenocarcinoma\par \par Patient underwent right hemicolectomy on 11/11/2019 with Dr. 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  CAT chest, abdomen and pelvis performed on 12/30/2019 demonstrated no evidence of metastatic disease.  Mediport placed on 12/3/2019.  Today her main complaint is cough which started on 12/4/2019.  Cough is productive FOR PAST DAY. We reviewed FOLFOX and side effects and purpose of treatment. Consent process performed and signed by patient.\par \par 12/24/19: \par Colon CA: Patient here for toxicity evaluation s/p FOLFOX C1 on 12/16/2019.  She tolerated infusion well and developed no anaphylaxis reactions.  Denies fever, chills, mouth sores, neuropathy, diarrhea or weight loss.  CBC with diff performed on 12/23/2019 was unremarkable.\par Broken Tooth: Patient noticed over the weekend her R molar broke off.  She was seen by her dentist who performed a filling however states patient will require a root canal.  \par Left gingiva swelling: Noted after her first treatment.  She was seen by her dentist who rx a local anesthetic with excellent relief.\par Fatigue: Mild fatigue 2-3 days after her first treatment however abates during her off week.  She is able to perform her routine/instrumental ADLs without restrictions.\par \par 1/15/2020: \par Colon CA: Today is C3D3 FOLFOX. Due to neutropenia during C2, 5-FU IVP was discontinued.  Patient has notice less fatigue afterwards.  She is doing well on treatment and reports no major adverse events.  She has intermittent cold induce neuropathy on days of treatment and 2-3 days afterwards however abates.  She denies fever, chills, mouth sores, diarrhea or mouth sores.\par Social: Patient in excellent spirits today.  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 She notices that the neuropathy is totally gone in between treatments. However she must avoid cold entirely. She has been very active in teaching and work. She has been singing regularly and will go to [place] in a week to sing. She went to [place] 2 weeks ago for ~ 4 days to visit her ill mother who is suffering from dementia. Overall, she is managing the psychological component of her treatment pretty well; and had relied on strategies from Dr. Moscoso, and support from friends. She feels relatively weak since the surgery and chemotherapy and is asking if she can participate in pilates classes and mild/moderate strengthening exercises, to which I supported.  She does not some left low back/flank pain in the AM and is concerned about her kidney.\par \par 3/11/2020:\par 1) Colon CA: C6 FOLFOX given on 3/4/2020.  Today her main complaint is fatigue.\par 2) Fatigue: Started on D4 of her last treatment.  Up until that point, she was doing extremely well.  On day 4-6 patient felt increase in fatigue.  She states she was in bed all day sleeping and is now fully recovering.  She is able to perform her routine/instrumental ADLs without any restrictions.  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   Patient has noticed she is pushing her self too much with working full time, taking care of her child and going to gym.  We have decided to cut back on work and see how patient responds after C7 of FOLFOX.\par 3) Anxiety: She currently works in [place] and is worried about her exposure to COVID-19.  She has decided to take 2-3 weeks off from work.\par \par 4/1/2020:\par 1) Colon CA: Today is C8 FOLFOX.\par 2) Neuropathy: Patient notices neuropathy located on her bilateral fingertips on days of treatment especially if she's exposed to cold temperatures.   Generally speaking patient wears mittens and notices if she is active the neuropathy tends to improve.  She denies neuropathy on her off week and denies it today.  She is able to functions and use her hands without any restrictions.\par 3) Social She is currently not working due to COVID-19 pandemic.\par 4) Left flank pain: Patient is complaining of intermittent lower back/flank pain that generally happens at night time.  She denies fever, chills, dysuria, hematuria or lifting heavy object.  She is concerned its her kidneys however her renal functions are WNL and last scan on 11/2019 demonstrated no abnormality in the kidneys.\par \par 5/6/2020:\par 1) Colon CA: Patient was scheduled for C10 FOLFOX today however due to thrombocytopenia, will delay treatment x one week.  She is feeling well today and denies fever, chills, diarrhea, rash, HFS or fatigue.  She underwent CAT chest, abdomen and pelvis on 5/5/2020 which demonstrated no recurrent or metastatic disease. \par 2) Neuropathy: Very mild neuropathy noted on her fingertips which is triggered by cold temperature.  Patient has noticed the neuropathy lasting on her off week.  It is intermittent and not too bothersome for patient.  She is able to use her hands without any restrictions.\par \par 7/28/2020: \par 1) Colon CA: Patient is here for evaluation after 12 cycles of chemotherapy. She tolerated treatment well and reports no significant adverse events. She has noted a pain or discomfort in the RLQ when passes stool through that area. Has been trying to increase fiber. It does depend on certain food, eg chocolate, certain breads and cheese. \par 2) Neuropathy: She is complaining of increasing neuropathy located on her fingertips and toes especially. Will continue to observe for improvement over time. She is using Accupuncture. \par \par 2/23/21\par Mammogram, breast US reviewed and are negative. Will repeat annually. \par Colonoscopy negative as per patient. Repeat in 3 years, and reviewed with patient\par Labs reviewed\par Neuropathy - improving in hands and slower in feet.\par Working for school as .\par Had both COVID vaccines - the last was on 2/4, just prior to surveillance CT scan and in the same arm as the mildly enlarged node in the axilla on left.\par \par 6/9/2021:\par 1) Colon CA: Patient is here for follow up for stage 3 colon CA.  She underwent CT chest, abdomen/pelvis on 6/3/2021 which demonstrated resolution of previously noted enlarged left axillary lymph nodes. No suspicious findings.\par We reviewed labs on 5/2021 which showed no acute abnormality.  CEA is WNL.\par She denies change in appetite, blood in stool, abdominal pain or change in bowel habits.\par Patient main complaint is bilateral eye twitching.\par As per patient she had 2 episodes of eye twitching and dizziness over the past 3 weeks.\par She denies fever, chills, change in mental status, headache, nausea, slurred speech or change in vision.\par She is resting properly however does admit to mild stress due to pandemic.\par She is currently on Levothyroxine 75 mcg, will check TSH levels today.\par 2) Social: Patient has no restrictions with her ADLs.  She is swimming three times/week.\par She is planning on travelling to [facility] towards the end of June 2021.\par \par 11/10/2021: TEB\par 1) Colon CA: Patient underwent CAT chest, abdomen/pelvis on 10/2021 which revealed no evidence of metastatic disease.\par Her labs on 9/2021 were unremarkable.  \par Her last port flush was on 10/2021.  She was advised to make q 6-8 weeks.\par She is feeling relative well and voices no new complaints.  Denies fatigue, weight loss, blood in stool or abdominal pain.\par 2) Eye lesion: Patient went for routine checkup with her ophthalmologist (Dr. Jessenia Mcneil) and was found to have a lesion on her left eye.\par She has an appointment on Wednesday (11/17/2021) with an ophthalmologist [place]ologist[place]az [place] [place] 3) Right ear tinnitus: She is being seen by ENT and was recommended to undergo MRI head however was unable to undergo.\par 4) Social: Patient received Pfizer booster vaccine.\par She is working full time.\par \par 4/28/22\par Patient doing well. \par Last colonoscopy with Dr Haynes was negative 10/2020 and is due 2023 again.\par CT scan 3/2022 negative\par US ovary - chronic cyst.\par Copies provided to review with Gyn. [de-identified] : adenocarcinoma [de-identified] : CEA - no preop level. [de-identified] : Surgeon: Yuval Haynes; [phone]\par PCP: Kimberly Melton; [phone]\par Gynecology: Rita An; [phone]\par \par Patient Cell # [phone]\par \par

## 2022-07-27 ENCOUNTER — OUTPATIENT (OUTPATIENT)
Dept: OUTPATIENT SERVICES | Facility: HOSPITAL | Age: 62
LOS: 1 days | Discharge: HOME | End: 2022-07-27

## 2022-07-27 DIAGNOSIS — Z98.890 OTHER SPECIFIED POSTPROCEDURAL STATES: Chronic | ICD-10-CM

## 2022-07-27 DIAGNOSIS — C18.9 MALIGNANT NEOPLASM OF COLON, UNSPECIFIED: ICD-10-CM

## 2022-07-27 DIAGNOSIS — Z98.891 HISTORY OF UTERINE SCAR FROM PREVIOUS SURGERY: Chronic | ICD-10-CM

## 2022-08-05 ENCOUNTER — RESULT REVIEW (OUTPATIENT)
Age: 62
End: 2022-08-05

## 2022-08-05 ENCOUNTER — APPOINTMENT (OUTPATIENT)
Dept: INFUSION THERAPY | Facility: HOSPITAL | Age: 62
End: 2022-08-05

## 2022-08-05 LAB
ALBUMIN SERPL ELPH-MCNC: 4.4 G/DL — SIGNIFICANT CHANGE UP (ref 3.3–5)
ALP SERPL-CCNC: 102 U/L — SIGNIFICANT CHANGE UP (ref 40–120)
ALT FLD-CCNC: 14 U/L — SIGNIFICANT CHANGE UP (ref 10–45)
ANION GAP SERPL CALC-SCNC: 10 MMOL/L — SIGNIFICANT CHANGE UP (ref 5–17)
AST SERPL-CCNC: 23 U/L — SIGNIFICANT CHANGE UP (ref 10–40)
BILIRUB SERPL-MCNC: 0.4 MG/DL — SIGNIFICANT CHANGE UP (ref 0.2–1.2)
BUN SERPL-MCNC: 15 MG/DL — SIGNIFICANT CHANGE UP (ref 7–23)
CALCIUM SERPL-MCNC: 9 MG/DL — SIGNIFICANT CHANGE UP (ref 8.4–10.5)
CEA SERPL-MCNC: 1 NG/ML — SIGNIFICANT CHANGE UP (ref 0–3.8)
CHLORIDE SERPL-SCNC: 101 MMOL/L — SIGNIFICANT CHANGE UP (ref 96–108)
CO2 SERPL-SCNC: 28 MMOL/L — SIGNIFICANT CHANGE UP (ref 22–31)
CREAT SERPL-MCNC: 0.81 MG/DL — SIGNIFICANT CHANGE UP (ref 0.5–1.3)
EGFR: 83 ML/MIN/1.73M2 — SIGNIFICANT CHANGE UP
GLUCOSE SERPL-MCNC: 94 MG/DL — SIGNIFICANT CHANGE UP (ref 70–99)
HCT VFR BLD CALC: 38.4 % — SIGNIFICANT CHANGE UP (ref 34.5–45)
HGB BLD-MCNC: 12.6 G/DL — SIGNIFICANT CHANGE UP (ref 11.5–15.5)
MCHC RBC-ENTMCNC: 29.1 PG — SIGNIFICANT CHANGE UP (ref 27–34)
MCHC RBC-ENTMCNC: 32.8 G/DL — SIGNIFICANT CHANGE UP (ref 32–36)
MCV RBC AUTO: 88.7 FL — SIGNIFICANT CHANGE UP (ref 80–100)
PLATELET # BLD AUTO: 202 K/UL — SIGNIFICANT CHANGE UP (ref 150–400)
POTASSIUM SERPL-MCNC: 4.4 MMOL/L — SIGNIFICANT CHANGE UP (ref 3.5–5.3)
POTASSIUM SERPL-SCNC: 4.4 MMOL/L — SIGNIFICANT CHANGE UP (ref 3.5–5.3)
PROT SERPL-MCNC: 6.8 G/DL — SIGNIFICANT CHANGE UP (ref 6–8.3)
RBC # BLD: 4.33 M/UL — SIGNIFICANT CHANGE UP (ref 3.8–5.2)
RBC # FLD: 13.4 % — SIGNIFICANT CHANGE UP (ref 10.3–14.5)
SODIUM SERPL-SCNC: 139 MMOL/L — SIGNIFICANT CHANGE UP (ref 135–145)
WBC # BLD: 5.38 K/UL — SIGNIFICANT CHANGE UP (ref 3.8–10.5)
WBC # FLD AUTO: 5.38 K/UL — SIGNIFICANT CHANGE UP (ref 3.8–10.5)

## 2022-09-10 ENCOUNTER — APPOINTMENT (OUTPATIENT)
Dept: CT IMAGING | Facility: IMAGING CENTER | Age: 62
End: 2022-09-10

## 2022-09-10 ENCOUNTER — OUTPATIENT (OUTPATIENT)
Dept: OUTPATIENT SERVICES | Facility: HOSPITAL | Age: 62
LOS: 1 days | End: 2022-09-10
Payer: COMMERCIAL

## 2022-09-10 DIAGNOSIS — Z98.890 OTHER SPECIFIED POSTPROCEDURAL STATES: Chronic | ICD-10-CM

## 2022-09-10 DIAGNOSIS — C18.1 MALIGNANT NEOPLASM OF APPENDIX: ICD-10-CM

## 2022-09-10 DIAGNOSIS — Z00.8 ENCOUNTER FOR OTHER GENERAL EXAMINATION: ICD-10-CM

## 2022-09-10 DIAGNOSIS — Z98.891 HISTORY OF UTERINE SCAR FROM PREVIOUS SURGERY: Chronic | ICD-10-CM

## 2022-09-10 PROCEDURE — 74177 CT ABD & PELVIS W/CONTRAST: CPT | Mod: 26

## 2022-09-10 PROCEDURE — 71260 CT THORAX DX C+: CPT | Mod: 26

## 2022-09-10 PROCEDURE — 71260 CT THORAX DX C+: CPT

## 2022-09-10 PROCEDURE — 74177 CT ABD & PELVIS W/CONTRAST: CPT

## 2022-10-12 ENCOUNTER — NON-APPOINTMENT (OUTPATIENT)
Age: 62
End: 2022-10-12

## 2022-10-28 ENCOUNTER — APPOINTMENT (OUTPATIENT)
Dept: COLORECTAL SURGERY | Facility: CLINIC | Age: 62
End: 2022-10-28

## 2022-10-28 PROCEDURE — 45300 PROCTOSIGMOIDOSCOPY DX: CPT

## 2022-10-28 PROCEDURE — 99213 OFFICE O/P EST LOW 20 MIN: CPT | Mod: 25

## 2022-11-01 DIAGNOSIS — Z12.11 ENCOUNTER FOR SCREENING FOR MALIGNANT NEOPLASM OF COLON: ICD-10-CM

## 2022-11-04 NOTE — HISTORY OF PRESENT ILLNESS
[FreeTextEntry1] : Very pleasant 61-year-old female who is well-known to me.\par \par Approximately 3 years ago I performed a laparoscopic right colectomy for a stage III carcinoma of the colon with multiple positive nodes.  She completed adjuvant chemotherapy and actually looks and feels excellently.  A CAT scan as recently as September 2022 revealed no recurrent disease.\par \par Patient presents today due to 2 episodes of what seems like bright red rectal bleeding.  She noticed this on a towel after taking a shower but she believes it was from the rectum.\par \par Physical examination reveals a healthy-appearing female.  Her abdomen is soft, nontender and nondistended.  Her trocar sites and specimen extraction site are fully healed with no evidence of incisional hernias.\par \par Inspection of the anorectal area is unremarkable.  Digital examination reveals no palpable mass.  Limited sigmoidoscopy to 12 cm is negative with proximal brown fecal matter.  Anoscopy reveals minor internal hemorrhoids.\par \par I presume that her bleeding is hemorrhoidal in nature.  With her past history I would advise being cautious and repeating a colonoscopy as her last one was approximately 2 years ago.  This will rule out any proximal synchronous cause of the bleeding.

## 2022-11-07 ENCOUNTER — APPOINTMENT (OUTPATIENT)
Dept: COLORECTAL SURGERY | Facility: CLINIC | Age: 62
End: 2022-11-07

## 2022-11-07 PROCEDURE — 45378 DIAGNOSTIC COLONOSCOPY: CPT

## 2022-11-11 ENCOUNTER — RX RENEWAL (OUTPATIENT)
Age: 62
End: 2022-11-11

## 2022-11-11 RX ORDER — LEVOTHYROXINE SODIUM 0.07 MG/1
75 TABLET ORAL
Qty: 90 | Refills: 0 | Status: ACTIVE | COMMUNITY
Start: 2021-12-28 | End: 1900-01-01

## 2022-12-08 ENCOUNTER — OUTPATIENT (OUTPATIENT)
Dept: OUTPATIENT SERVICES | Facility: HOSPITAL | Age: 62
LOS: 1 days | Discharge: HOME | End: 2022-12-08

## 2022-12-08 DIAGNOSIS — Z98.890 OTHER SPECIFIED POSTPROCEDURAL STATES: Chronic | ICD-10-CM

## 2022-12-08 DIAGNOSIS — Z98.891 HISTORY OF UTERINE SCAR FROM PREVIOUS SURGERY: Chronic | ICD-10-CM

## 2022-12-08 DIAGNOSIS — C18.9 MALIGNANT NEOPLASM OF COLON, UNSPECIFIED: ICD-10-CM

## 2022-12-09 ENCOUNTER — RESULT REVIEW (OUTPATIENT)
Age: 62
End: 2022-12-09

## 2022-12-09 ENCOUNTER — APPOINTMENT (OUTPATIENT)
Dept: INFUSION THERAPY | Facility: HOSPITAL | Age: 62
End: 2022-12-09

## 2022-12-09 ENCOUNTER — APPOINTMENT (OUTPATIENT)
Dept: RADIOLOGY | Facility: IMAGING CENTER | Age: 62
End: 2022-12-09

## 2022-12-09 ENCOUNTER — OUTPATIENT (OUTPATIENT)
Dept: OUTPATIENT SERVICES | Facility: HOSPITAL | Age: 62
LOS: 1 days | End: 2022-12-09
Payer: COMMERCIAL

## 2022-12-09 ENCOUNTER — APPOINTMENT (OUTPATIENT)
Dept: MAMMOGRAPHY | Facility: IMAGING CENTER | Age: 62
End: 2022-12-09

## 2022-12-09 ENCOUNTER — APPOINTMENT (OUTPATIENT)
Dept: ULTRASOUND IMAGING | Facility: IMAGING CENTER | Age: 62
End: 2022-12-09

## 2022-12-09 DIAGNOSIS — Z98.890 OTHER SPECIFIED POSTPROCEDURAL STATES: Chronic | ICD-10-CM

## 2022-12-09 DIAGNOSIS — Z00.8 ENCOUNTER FOR OTHER GENERAL EXAMINATION: ICD-10-CM

## 2022-12-09 DIAGNOSIS — Z98.891 HISTORY OF UTERINE SCAR FROM PREVIOUS SURGERY: Chronic | ICD-10-CM

## 2022-12-09 LAB
BASOPHILS # BLD AUTO: 0.03 K/UL — SIGNIFICANT CHANGE UP (ref 0–0.2)
BASOPHILS NFR BLD AUTO: 0.5 % — SIGNIFICANT CHANGE UP (ref 0–2)
EOSINOPHIL # BLD AUTO: 0.19 K/UL — SIGNIFICANT CHANGE UP (ref 0–0.5)
EOSINOPHIL NFR BLD AUTO: 3.3 % — SIGNIFICANT CHANGE UP (ref 0–6)
HCT VFR BLD CALC: 40.4 % — SIGNIFICANT CHANGE UP (ref 34.5–45)
HGB BLD-MCNC: 13 G/DL — SIGNIFICANT CHANGE UP (ref 11.5–15.5)
IMM GRANULOCYTES NFR BLD AUTO: 0.2 % — SIGNIFICANT CHANGE UP (ref 0–0.9)
LYMPHOCYTES # BLD AUTO: 1.68 K/UL — SIGNIFICANT CHANGE UP (ref 1–3.3)
LYMPHOCYTES # BLD AUTO: 29.4 % — SIGNIFICANT CHANGE UP (ref 13–44)
MCHC RBC-ENTMCNC: 28.9 PG — SIGNIFICANT CHANGE UP (ref 27–34)
MCHC RBC-ENTMCNC: 32.2 G/DL — SIGNIFICANT CHANGE UP (ref 32–36)
MCV RBC AUTO: 89.8 FL — SIGNIFICANT CHANGE UP (ref 80–100)
MONOCYTES # BLD AUTO: 0.42 K/UL — SIGNIFICANT CHANGE UP (ref 0–0.9)
MONOCYTES NFR BLD AUTO: 7.4 % — SIGNIFICANT CHANGE UP (ref 2–14)
NEUTROPHILS # BLD AUTO: 3.38 K/UL — SIGNIFICANT CHANGE UP (ref 1.8–7.4)
NEUTROPHILS NFR BLD AUTO: 59.2 % — SIGNIFICANT CHANGE UP (ref 43–77)
NRBC # BLD: 0 /100 WBCS — SIGNIFICANT CHANGE UP (ref 0–0)
PLATELET # BLD AUTO: 173 K/UL — SIGNIFICANT CHANGE UP (ref 150–400)
RBC # BLD: 4.5 M/UL — SIGNIFICANT CHANGE UP (ref 3.8–5.2)
RBC # FLD: 13.3 % — SIGNIFICANT CHANGE UP (ref 10.3–14.5)
WBC # BLD: 5.71 K/UL — SIGNIFICANT CHANGE UP (ref 3.8–10.5)
WBC # FLD AUTO: 5.71 K/UL — SIGNIFICANT CHANGE UP (ref 3.8–10.5)

## 2022-12-09 PROCEDURE — 77063 BREAST TOMOSYNTHESIS BI: CPT | Mod: 26

## 2022-12-09 PROCEDURE — 76641 ULTRASOUND BREAST COMPLETE: CPT | Mod: 26,50

## 2022-12-09 PROCEDURE — 77067 SCR MAMMO BI INCL CAD: CPT | Mod: 26

## 2022-12-09 PROCEDURE — 76641 ULTRASOUND BREAST COMPLETE: CPT

## 2022-12-09 PROCEDURE — 72110 X-RAY EXAM L-2 SPINE 4/>VWS: CPT | Mod: 26

## 2022-12-09 PROCEDURE — 72110 X-RAY EXAM L-2 SPINE 4/>VWS: CPT

## 2022-12-09 PROCEDURE — 77063 BREAST TOMOSYNTHESIS BI: CPT

## 2022-12-09 PROCEDURE — 77067 SCR MAMMO BI INCL CAD: CPT

## 2022-12-10 LAB
ALBUMIN SERPL ELPH-MCNC: 4.6 G/DL — SIGNIFICANT CHANGE UP (ref 3.3–5)
ALP SERPL-CCNC: 99 U/L — SIGNIFICANT CHANGE UP (ref 40–120)
ALT FLD-CCNC: 25 U/L — SIGNIFICANT CHANGE UP (ref 10–45)
ANION GAP SERPL CALC-SCNC: 13 MMOL/L — SIGNIFICANT CHANGE UP (ref 5–17)
AST SERPL-CCNC: 32 U/L — SIGNIFICANT CHANGE UP (ref 10–40)
BILIRUB SERPL-MCNC: 0.4 MG/DL — SIGNIFICANT CHANGE UP (ref 0.2–1.2)
BUN SERPL-MCNC: 14 MG/DL — SIGNIFICANT CHANGE UP (ref 7–23)
CALCIUM SERPL-MCNC: 9.1 MG/DL — SIGNIFICANT CHANGE UP (ref 8.4–10.5)
CEA SERPL-MCNC: 1.3 NG/ML — SIGNIFICANT CHANGE UP (ref 0–3.8)
CHLORIDE SERPL-SCNC: 103 MMOL/L — SIGNIFICANT CHANGE UP (ref 96–108)
CO2 SERPL-SCNC: 23 MMOL/L — SIGNIFICANT CHANGE UP (ref 22–31)
CREAT SERPL-MCNC: 0.79 MG/DL — SIGNIFICANT CHANGE UP (ref 0.5–1.3)
EGFR: 86 ML/MIN/1.73M2 — SIGNIFICANT CHANGE UP
GLUCOSE SERPL-MCNC: 71 MG/DL — SIGNIFICANT CHANGE UP (ref 70–99)
POTASSIUM SERPL-MCNC: 4.6 MMOL/L — SIGNIFICANT CHANGE UP (ref 3.5–5.3)
POTASSIUM SERPL-SCNC: 4.6 MMOL/L — SIGNIFICANT CHANGE UP (ref 3.5–5.3)
PROT SERPL-MCNC: 6.9 G/DL — SIGNIFICANT CHANGE UP (ref 6–8.3)
SODIUM SERPL-SCNC: 139 MMOL/L — SIGNIFICANT CHANGE UP (ref 135–145)

## 2023-04-04 ENCOUNTER — OUTPATIENT (OUTPATIENT)
Dept: OUTPATIENT SERVICES | Facility: HOSPITAL | Age: 63
LOS: 1 days | Discharge: HOME | End: 2023-04-04

## 2023-04-04 DIAGNOSIS — Z98.890 OTHER SPECIFIED POSTPROCEDURAL STATES: Chronic | ICD-10-CM

## 2023-04-04 DIAGNOSIS — C18.9 MALIGNANT NEOPLASM OF COLON, UNSPECIFIED: ICD-10-CM

## 2023-04-04 DIAGNOSIS — Z98.891 HISTORY OF UTERINE SCAR FROM PREVIOUS SURGERY: Chronic | ICD-10-CM

## 2023-04-05 ENCOUNTER — APPOINTMENT (OUTPATIENT)
Dept: INFUSION THERAPY | Facility: HOSPITAL | Age: 63
End: 2023-04-05

## 2023-04-05 ENCOUNTER — RESULT REVIEW (OUTPATIENT)
Age: 63
End: 2023-04-05

## 2023-04-05 LAB
ALBUMIN SERPL ELPH-MCNC: 4.5 G/DL — SIGNIFICANT CHANGE UP (ref 3.3–5)
ALP SERPL-CCNC: 107 U/L — SIGNIFICANT CHANGE UP (ref 40–120)
ALT FLD-CCNC: 19 U/L — SIGNIFICANT CHANGE UP (ref 10–45)
ANION GAP SERPL CALC-SCNC: 10 MMOL/L — SIGNIFICANT CHANGE UP (ref 5–17)
AST SERPL-CCNC: 31 U/L — SIGNIFICANT CHANGE UP (ref 10–40)
BILIRUB SERPL-MCNC: 0.4 MG/DL — SIGNIFICANT CHANGE UP (ref 0.2–1.2)
BUN SERPL-MCNC: 16 MG/DL — SIGNIFICANT CHANGE UP (ref 7–23)
CALCIUM SERPL-MCNC: 9.4 MG/DL — SIGNIFICANT CHANGE UP (ref 8.4–10.5)
CHLORIDE SERPL-SCNC: 100 MMOL/L — SIGNIFICANT CHANGE UP (ref 96–108)
CO2 SERPL-SCNC: 28 MMOL/L — SIGNIFICANT CHANGE UP (ref 22–31)
CREAT SERPL-MCNC: 0.68 MG/DL — SIGNIFICANT CHANGE UP (ref 0.5–1.3)
EGFR: 99 ML/MIN/1.73M2 — SIGNIFICANT CHANGE UP
GLUCOSE SERPL-MCNC: 98 MG/DL — SIGNIFICANT CHANGE UP (ref 70–99)
HCT VFR BLD CALC: 37.8 % — SIGNIFICANT CHANGE UP (ref 34.5–45)
HGB BLD-MCNC: 12.3 G/DL — SIGNIFICANT CHANGE UP (ref 11.5–15.5)
MCHC RBC-ENTMCNC: 28.6 PG — SIGNIFICANT CHANGE UP (ref 27–34)
MCHC RBC-ENTMCNC: 32.5 G/DL — SIGNIFICANT CHANGE UP (ref 32–36)
MCV RBC AUTO: 87.9 FL — SIGNIFICANT CHANGE UP (ref 80–100)
PLATELET # BLD AUTO: 157 K/UL — SIGNIFICANT CHANGE UP (ref 150–400)
POTASSIUM SERPL-MCNC: 4.3 MMOL/L — SIGNIFICANT CHANGE UP (ref 3.5–5.3)
POTASSIUM SERPL-SCNC: 4.3 MMOL/L — SIGNIFICANT CHANGE UP (ref 3.5–5.3)
PROT SERPL-MCNC: 6.9 G/DL — SIGNIFICANT CHANGE UP (ref 6–8.3)
RBC # BLD: 4.3 M/UL — SIGNIFICANT CHANGE UP (ref 3.8–5.2)
RBC # FLD: 13.2 % — SIGNIFICANT CHANGE UP (ref 10.3–14.5)
SODIUM SERPL-SCNC: 137 MMOL/L — SIGNIFICANT CHANGE UP (ref 135–145)
WBC # BLD: 4.45 K/UL — SIGNIFICANT CHANGE UP (ref 3.8–10.5)
WBC # FLD AUTO: 4.45 K/UL — SIGNIFICANT CHANGE UP (ref 3.8–10.5)

## 2023-04-06 LAB — CEA SERPL-MCNC: 1.2 NG/ML — SIGNIFICANT CHANGE UP (ref 0–3.8)

## 2023-04-24 NOTE — REVIEW OF SYSTEMS
[Anxiety] : anxiety [Negative] : Allergic/Immunologic [FreeTextEntry7] : mild tightness of the abdominal incision. [de-identified] : hypothyroidism Albendazole Pregnancy And Lactation Text: This medication is Pregnancy Category C and it isn't known if it is safe during pregnancy. It is also excreted in breast milk.

## 2023-06-28 ENCOUNTER — RESULT REVIEW (OUTPATIENT)
Age: 63
End: 2023-06-28

## 2023-07-07 ENCOUNTER — APPOINTMENT (OUTPATIENT)
Dept: CT IMAGING | Facility: IMAGING CENTER | Age: 63
End: 2023-07-07
Payer: COMMERCIAL

## 2023-07-07 ENCOUNTER — OUTPATIENT (OUTPATIENT)
Dept: OUTPATIENT SERVICES | Facility: HOSPITAL | Age: 63
LOS: 1 days | End: 2023-07-07
Payer: COMMERCIAL

## 2023-07-07 DIAGNOSIS — C18.1 MALIGNANT NEOPLASM OF APPENDIX: ICD-10-CM

## 2023-07-07 DIAGNOSIS — Z98.890 OTHER SPECIFIED POSTPROCEDURAL STATES: Chronic | ICD-10-CM

## 2023-07-07 DIAGNOSIS — Z98.891 HISTORY OF UTERINE SCAR FROM PREVIOUS SURGERY: Chronic | ICD-10-CM

## 2023-07-07 PROCEDURE — 74177 CT ABD & PELVIS W/CONTRAST: CPT | Mod: 26

## 2023-07-07 PROCEDURE — 71260 CT THORAX DX C+: CPT

## 2023-07-07 PROCEDURE — 71260 CT THORAX DX C+: CPT | Mod: 26

## 2023-07-07 PROCEDURE — 74177 CT ABD & PELVIS W/CONTRAST: CPT

## 2023-07-10 ENCOUNTER — OUTPATIENT (OUTPATIENT)
Dept: OUTPATIENT SERVICES | Facility: HOSPITAL | Age: 63
LOS: 1 days | Discharge: HOME | End: 2023-07-10

## 2023-07-10 DIAGNOSIS — Z98.891 HISTORY OF UTERINE SCAR FROM PREVIOUS SURGERY: Chronic | ICD-10-CM

## 2023-07-10 DIAGNOSIS — Z98.890 OTHER SPECIFIED POSTPROCEDURAL STATES: Chronic | ICD-10-CM

## 2023-07-10 DIAGNOSIS — C18.9 MALIGNANT NEOPLASM OF COLON, UNSPECIFIED: ICD-10-CM

## 2023-07-14 ENCOUNTER — APPOINTMENT (OUTPATIENT)
Dept: HEMATOLOGY ONCOLOGY | Facility: CLINIC | Age: 63
End: 2023-07-14
Payer: COMMERCIAL

## 2023-07-14 VITALS
BODY MASS INDEX: 19.85 KG/M2 | TEMPERATURE: 97 F | RESPIRATION RATE: 16 BRPM | SYSTOLIC BLOOD PRESSURE: 121 MMHG | WEIGHT: 126.77 LBS | OXYGEN SATURATION: 99 % | HEART RATE: 72 BPM | DIASTOLIC BLOOD PRESSURE: 79 MMHG

## 2023-07-14 PROCEDURE — 99214 OFFICE O/P EST MOD 30 MIN: CPT

## 2023-07-14 NOTE — HISTORY OF PRESENT ILLNESS
[Disease: _____________________] : Disease: [unfilled] [T: ___] : T[unfilled] [N: ___] : N[unfilled] [M: ___] : M[unfilled] [AJCC Stage: ____] : AJCC Stage: [unfilled] [de-identified] : Ms. Rutherford is a 58 year old female with past medical history of Graves disease presenting to the office for an initial consultation of colon CA.\par \par Patient c/o right LQ pain with occasional bloating, denies change in bowel habits for several months. Had a colonoscopy in 2012 that is self-reported as normal. 2017 pelvic sono was negative. \par \par She was seen by GI in late 10/2019.  CAT abdomen with contrast ordered which revealed cecal mass with enlarged LNs.  F/U colonoscopy revealed sessile SC polyp which was biopsied (hyperplastic) and a malignant appearing mass involving IC valve/cecum.\par Pathology: adenocarcinoma\par \par Patient underwent right hemicolectomy on 11/11/2019 with Dr. 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  CAT chest, abdomen and pelvis performed on 12/30/2019 demonstrated no evidence of metastatic disease.  Mediport placed on 12/3/2019.  Today her main complaint is cough which started on 12/4/2019.  Cough is productive FOR PAST DAY. We reviewed FOLFOX and side effects and purpose of treatment. Consent process performed and signed by patient.\par \par 12/24/19: \par Colon CA: Patient here for toxicity evaluation s/p FOLFOX C1 on 12/16/2019.  She tolerated infusion well and developed no anaphylaxis reactions.  Denies fever, chills, mouth sores, neuropathy, diarrhea or weight loss.  CBC with diff performed on 12/23/2019 was unremarkable.\par Broken Tooth: Patient noticed over the weekend her R molar broke off.  She was seen by her dentist who performed a filling however states patient will require a root canal.  \par Left gingiva swelling: Noted after her first treatment.  She was seen by her dentist who rx a local anesthetic with excellent relief.\par Fatigue: Mild fatigue 2-3 days after her first treatment however abates during her off week.  She is able to perform her routine/instrumental ADLs without restrictions.\par \par 1/15/2020: \par Colon CA: Today is C3D3 FOLFOX. Due to neutropenia during C2, 5-FU IVP was discontinued.  Patient has notice less fatigue afterwards.  She is doing well on treatment and reports no major adverse events.  She has intermittent cold induce neuropathy on days of treatment and 2-3 days afterwards however abates.  She denies fever, chills, mouth sores, diarrhea or mouth sores.\par Social: Patient in excellent spirits today.  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 She notices that the neuropathy is totally gone in between treatments. However she must avoid cold entirely. She has been very active in teaching and work. She has been singing regularly and will go to [place] in a week to sing. She went to [place] 2 weeks ago for ~ 4 days to visit her ill mother who is suffering from dementia. Overall, she is managing the psychological component of her treatment pretty well; and had relied on strategies from Dr. Moscoso, and support from friends. She feels relatively weak since the surgery and chemotherapy and is asking if she can participate in pilates classes and mild/moderate strengthening exercises, to which I supported.  She does not some left low back/flank pain in the AM and is concerned about her kidney.\par \par 3/11/2020:\par 1) Colon CA: C6 FOLFOX given on 3/4/2020.  Today her main complaint is fatigue.\par 2) Fatigue: Started on D4 of her last treatment.  Up until that point, she was doing extremely well.  On day 4-6 patient felt increase in fatigue.  She states she was in bed all day sleeping and is now fully recovering.  She is able to perform her routine/instrumental ADLs without any restrictions.  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   Patient has noticed she is pushing her self too much with working full time, taking care of her child and going to gym.  We have decided to cut back on work and see how patient responds after C7 of FOLFOX.\par 3) Anxiety: She currently works in [place] and is worried about her exposure to COVID-19.  She has decided to take 2-3 weeks off from work.\par \par 4/1/2020:\par 1) Colon CA: Today is C8 FOLFOX.\par 2) Neuropathy: Patient notices neuropathy located on her bilateral fingertips on days of treatment especially if she's exposed to cold temperatures.   Generally speaking patient wears mittens and notices if she is active the neuropathy tends to improve.  She denies neuropathy on her off week and denies it today.  She is able to functions and use her hands without any restrictions.\par 3) Social She is currently not working due to COVID-19 pandemic.\par 4) Left flank pain: Patient is complaining of intermittent lower back/flank pain that generally happens at night time.  She denies fever, chills, dysuria, hematuria or lifting heavy object.  She is concerned its her kidneys however her renal functions are WNL and last scan on 11/2019 demonstrated no abnormality in the kidneys.\par \par 5/6/2020:\par 1) Colon CA: Patient was scheduled for C10 FOLFOX today however due to thrombocytopenia, will delay treatment x one week.  She is feeling well today and denies fever, chills, diarrhea, rash, HFS or fatigue.  She underwent CAT chest, abdomen and pelvis on 5/5/2020 which demonstrated no recurrent or metastatic disease. \par 2) Neuropathy: Very mild neuropathy noted on her fingertips which is triggered by cold temperature.  Patient has noticed the neuropathy lasting on her off week.  It is intermittent and not too bothersome for patient.  She is able to use her hands without any restrictions.\par \par 7/28/2020: \par 1) Colon CA: Patient is here for evaluation after 12 cycles of chemotherapy. She tolerated treatment well and reports no significant adverse events. She has noted a pain or discomfort in the RLQ when passes stool through that area. Has been trying to increase fiber. It does depend on certain food, eg chocolate, certain breads and cheese. \par 2) Neuropathy: She is complaining of increasing neuropathy located on her fingertips and toes especially. Will continue to observe for improvement over time. She is using Accupuncture. \par \par 2/23/21\par Mammogram, breast US reviewed and are negative. Will repeat annually. \par Colonoscopy negative as per patient. Repeat in 3 years, and reviewed with patient\par Labs reviewed\par Neuropathy - improving in hands and slower in feet.\par Working for school as .\par Had both COVID vaccines - the last was on 2/4, just prior to surveillance CT scan and in the same arm as the mildly enlarged node in the axilla on left.\par \par 6/9/2021:\par 1) Colon CA: Patient is here for follow up for stage 3 colon CA.  She underwent CT chest, abdomen/pelvis on 6/3/2021 which demonstrated resolution of previously noted enlarged left axillary lymph nodes. No suspicious findings.\par We reviewed labs on 5/2021 which showed no acute abnormality.  CEA is WNL.\par She denies change in appetite, blood in stool, abdominal pain or change in bowel habits.\par Patient main complaint is bilateral eye twitching.\par As per patient she had 2 episodes of eye twitching and dizziness over the past 3 weeks.\par She denies fever, chills, change in mental status, headache, nausea, slurred speech or change in vision.\par She is resting properly however does admit to mild stress due to pandemic.\par She is currently on Levothyroxine 75 mcg, will check TSH levels today.\par 2) Social: Patient has no restrictions with her ADLs.  She is swimming three times/week.\par She is planning on travelling to [facility] towards the end of June 2021.\par \par 11/10/2021: TEB\par 1) Colon CA: Patient underwent CAT chest, abdomen/pelvis on 10/2021 which revealed no evidence of metastatic disease.\par Her labs on 9/2021 were unremarkable.  \par Her last port flush was on 10/2021.  She was advised to make q 6-8 weeks.\par She is feeling relative well and voices no new complaints.  Denies fatigue, weight loss, blood in stool or abdominal pain.\par 2) Eye lesion: Patient went for routine checkup with her ophthalmologist (Dr. Jessenia Mcneil) and was found to have a lesion on her left eye.\par She has an appointment on Wednesday (11/17/2021) with an ophthalmologist oncologist, Dr. Annetta Vaz ([facility] [place]par 3) Right ear tinnitus: She is being seen by ENT and was recommended to undergo MRI head however was unable to undergo.\par 4) Social: Patient received Pfizer booster vaccine.\par She is working full time.\par \par 4/28/22\par Patient doing well. 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 Has trace pelvic fluid on new CT scan. Please assess for relapse. \par Had seen Dr Haynes who performed colonoscopy 11/2022 that was completely negative.\par Also, saw GYN last year and had a negative exam.\par Will obtain new US.\par Patient plans to travel to [place] in 8/2023.\par Patient started Restasis for dry eyes. [de-identified] : adenocarcinoma [de-identified] : CEA - no preop level. [de-identified] : Surgeon: Yuval Haynes; [phone]\par PCP: Kimberly Melton; [phone]\par Gynecology: Rita An; [phone]\par \par Patient Cell # [phone]\par \par

## 2023-07-17 ENCOUNTER — APPOINTMENT (OUTPATIENT)
Dept: ULTRASOUND IMAGING | Facility: IMAGING CENTER | Age: 63
End: 2023-07-17
Payer: COMMERCIAL

## 2023-07-17 ENCOUNTER — OUTPATIENT (OUTPATIENT)
Dept: OUTPATIENT SERVICES | Facility: HOSPITAL | Age: 63
LOS: 1 days | End: 2023-07-17
Payer: COMMERCIAL

## 2023-07-17 DIAGNOSIS — Z98.891 HISTORY OF UTERINE SCAR FROM PREVIOUS SURGERY: Chronic | ICD-10-CM

## 2023-07-17 DIAGNOSIS — C18.1 MALIGNANT NEOPLASM OF APPENDIX: ICD-10-CM

## 2023-07-17 DIAGNOSIS — Z98.890 OTHER SPECIFIED POSTPROCEDURAL STATES: Chronic | ICD-10-CM

## 2023-07-17 PROCEDURE — 76856 US EXAM PELVIC COMPLETE: CPT | Mod: 26

## 2023-07-17 PROCEDURE — 76856 US EXAM PELVIC COMPLETE: CPT

## 2023-07-22 ENCOUNTER — RESULT REVIEW (OUTPATIENT)
Age: 63
End: 2023-07-22

## 2023-07-22 ENCOUNTER — APPOINTMENT (OUTPATIENT)
Dept: INFUSION THERAPY | Facility: HOSPITAL | Age: 63
End: 2023-07-22

## 2023-07-22 LAB
BASOPHILS # BLD AUTO: 0.03 K/UL — SIGNIFICANT CHANGE UP (ref 0–0.2)
BASOPHILS NFR BLD AUTO: 0.5 % — SIGNIFICANT CHANGE UP (ref 0–2)
CEA SERPL-MCNC: 0.9 NG/ML — SIGNIFICANT CHANGE UP (ref 0–3.8)
EOSINOPHIL # BLD AUTO: 0.15 K/UL — SIGNIFICANT CHANGE UP (ref 0–0.5)
EOSINOPHIL NFR BLD AUTO: 2.3 % — SIGNIFICANT CHANGE UP (ref 0–6)
HCT VFR BLD CALC: 38.5 % — SIGNIFICANT CHANGE UP (ref 34.5–45)
HGB BLD-MCNC: 12.8 G/DL — SIGNIFICANT CHANGE UP (ref 11.5–15.5)
IMM GRANULOCYTES NFR BLD AUTO: 0.5 % — SIGNIFICANT CHANGE UP (ref 0–0.9)
LYMPHOCYTES # BLD AUTO: 1.48 K/UL — SIGNIFICANT CHANGE UP (ref 1–3.3)
LYMPHOCYTES # BLD AUTO: 22.9 % — SIGNIFICANT CHANGE UP (ref 13–44)
MCHC RBC-ENTMCNC: 29.1 PG — SIGNIFICANT CHANGE UP (ref 27–34)
MCHC RBC-ENTMCNC: 33.2 G/DL — SIGNIFICANT CHANGE UP (ref 32–36)
MCV RBC AUTO: 87.5 FL — SIGNIFICANT CHANGE UP (ref 80–100)
MONOCYTES # BLD AUTO: 0.43 K/UL — SIGNIFICANT CHANGE UP (ref 0–0.9)
MONOCYTES NFR BLD AUTO: 6.6 % — SIGNIFICANT CHANGE UP (ref 2–14)
NEUTROPHILS # BLD AUTO: 4.35 K/UL — SIGNIFICANT CHANGE UP (ref 1.8–7.4)
NEUTROPHILS NFR BLD AUTO: 67.2 % — SIGNIFICANT CHANGE UP (ref 43–77)
NRBC # BLD: 0 /100 WBCS — SIGNIFICANT CHANGE UP (ref 0–0)
PLATELET # BLD AUTO: 165 K/UL — SIGNIFICANT CHANGE UP (ref 150–400)
RBC # BLD: 4.4 M/UL — SIGNIFICANT CHANGE UP (ref 3.8–5.2)
RBC # FLD: 12.8 % — SIGNIFICANT CHANGE UP (ref 10.3–14.5)
WBC # BLD: 6.47 K/UL — SIGNIFICANT CHANGE UP (ref 3.8–10.5)
WBC # FLD AUTO: 6.47 K/UL — SIGNIFICANT CHANGE UP (ref 3.8–10.5)

## 2023-07-23 LAB
ALBUMIN SERPL ELPH-MCNC: 4.5 G/DL — SIGNIFICANT CHANGE UP (ref 3.3–5)
ALP SERPL-CCNC: 95 U/L — SIGNIFICANT CHANGE UP (ref 40–120)
ALT FLD-CCNC: 15 U/L — SIGNIFICANT CHANGE UP (ref 10–45)
ANION GAP SERPL CALC-SCNC: 18 MMOL/L — HIGH (ref 5–17)
AST SERPL-CCNC: 27 U/L — SIGNIFICANT CHANGE UP (ref 10–40)
BILIRUB SERPL-MCNC: 0.4 MG/DL — SIGNIFICANT CHANGE UP (ref 0.2–1.2)
BUN SERPL-MCNC: 13 MG/DL — SIGNIFICANT CHANGE UP (ref 7–23)
CALCIUM SERPL-MCNC: 8.9 MG/DL — SIGNIFICANT CHANGE UP (ref 8.4–10.5)
CHLORIDE SERPL-SCNC: 100 MMOL/L — SIGNIFICANT CHANGE UP (ref 96–108)
CO2 SERPL-SCNC: 21 MMOL/L — LOW (ref 22–31)
CREAT SERPL-MCNC: 0.8 MG/DL — SIGNIFICANT CHANGE UP (ref 0.5–1.3)
EGFR: 84 ML/MIN/1.73M2 — SIGNIFICANT CHANGE UP
GLUCOSE SERPL-MCNC: 64 MG/DL — LOW (ref 70–99)
POTASSIUM SERPL-MCNC: 4.5 MMOL/L — SIGNIFICANT CHANGE UP (ref 3.5–5.3)
POTASSIUM SERPL-SCNC: 4.5 MMOL/L — SIGNIFICANT CHANGE UP (ref 3.5–5.3)
PROT SERPL-MCNC: 6.9 G/DL — SIGNIFICANT CHANGE UP (ref 6–8.3)
SODIUM SERPL-SCNC: 139 MMOL/L — SIGNIFICANT CHANGE UP (ref 135–145)

## 2023-09-11 ENCOUNTER — OUTPATIENT (OUTPATIENT)
Dept: OUTPATIENT SERVICES | Facility: HOSPITAL | Age: 63
LOS: 1 days | Discharge: HOME | End: 2023-09-11

## 2023-09-11 DIAGNOSIS — Z98.891 HISTORY OF UTERINE SCAR FROM PREVIOUS SURGERY: Chronic | ICD-10-CM

## 2023-09-11 DIAGNOSIS — C18.9 MALIGNANT NEOPLASM OF COLON, UNSPECIFIED: ICD-10-CM

## 2023-09-11 DIAGNOSIS — Z98.890 OTHER SPECIFIED POSTPROCEDURAL STATES: Chronic | ICD-10-CM

## 2023-09-12 ENCOUNTER — APPOINTMENT (OUTPATIENT)
Dept: HEMATOLOGY ONCOLOGY | Facility: CLINIC | Age: 63
End: 2023-09-12

## 2023-09-12 ENCOUNTER — RESULT REVIEW (OUTPATIENT)
Age: 63
End: 2023-09-12

## 2023-09-12 LAB
BASOPHILS # BLD AUTO: 0.03 K/UL — SIGNIFICANT CHANGE UP (ref 0–0.2)
BASOPHILS NFR BLD AUTO: 0.6 % — SIGNIFICANT CHANGE UP (ref 0–2)
EOSINOPHIL # BLD AUTO: 0.14 K/UL — SIGNIFICANT CHANGE UP (ref 0–0.5)
EOSINOPHIL NFR BLD AUTO: 2.7 % — SIGNIFICANT CHANGE UP (ref 0–6)
HCT VFR BLD CALC: 41.4 % — SIGNIFICANT CHANGE UP (ref 34.5–45)
HGB BLD-MCNC: 13 G/DL — SIGNIFICANT CHANGE UP (ref 11.5–15.5)
IMM GRANULOCYTES NFR BLD AUTO: 0.2 % — SIGNIFICANT CHANGE UP (ref 0–0.9)
LYMPHOCYTES # BLD AUTO: 1.55 K/UL — SIGNIFICANT CHANGE UP (ref 1–3.3)
LYMPHOCYTES # BLD AUTO: 30.4 % — SIGNIFICANT CHANGE UP (ref 13–44)
MCHC RBC-ENTMCNC: 27.8 PG — SIGNIFICANT CHANGE UP (ref 27–34)
MCHC RBC-ENTMCNC: 31.4 G/DL — LOW (ref 32–36)
MCV RBC AUTO: 88.7 FL — SIGNIFICANT CHANGE UP (ref 80–100)
MONOCYTES # BLD AUTO: 0.37 K/UL — SIGNIFICANT CHANGE UP (ref 0–0.9)
MONOCYTES NFR BLD AUTO: 7.3 % — SIGNIFICANT CHANGE UP (ref 2–14)
NEUTROPHILS # BLD AUTO: 3 K/UL — SIGNIFICANT CHANGE UP (ref 1.8–7.4)
NEUTROPHILS NFR BLD AUTO: 58.8 % — SIGNIFICANT CHANGE UP (ref 43–77)
NRBC # BLD: 0 /100 WBCS — SIGNIFICANT CHANGE UP (ref 0–0)
PLATELET # BLD AUTO: 179 K/UL — SIGNIFICANT CHANGE UP (ref 150–400)
RBC # BLD: 4.67 M/UL — SIGNIFICANT CHANGE UP (ref 3.8–5.2)
RBC # FLD: 12.9 % — SIGNIFICANT CHANGE UP (ref 10.3–14.5)
WBC # BLD: 5.1 K/UL — SIGNIFICANT CHANGE UP (ref 3.8–10.5)
WBC # FLD AUTO: 5.1 K/UL — SIGNIFICANT CHANGE UP (ref 3.8–10.5)

## 2023-09-13 LAB
ALBUMIN SERPL ELPH-MCNC: 4.7 G/DL
ALP BLD-CCNC: 94 U/L
ALT SERPL-CCNC: 14 U/L
ANION GAP SERPL CALC-SCNC: 12 MMOL/L
APTT BLD: 35.5 SEC
AST SERPL-CCNC: 26 U/L
BILIRUB SERPL-MCNC: 0.4 MG/DL
BUN SERPL-MCNC: 13 MG/DL
CALCIUM SERPL-MCNC: 9.1 MG/DL
CEA SERPL-MCNC: 1.1 NG/ML
CHLORIDE SERPL-SCNC: 97 MMOL/L
CO2 SERPL-SCNC: 29 MMOL/L
CREAT SERPL-MCNC: 0.77 MG/DL
EGFR: 88 ML/MIN/1.73M2
GLUCOSE SERPL-MCNC: 84 MG/DL
INR PPP: 0.93 RATIO
POTASSIUM SERPL-SCNC: 4.5 MMOL/L
PROT SERPL-MCNC: 7 G/DL
PT BLD: 10.5 SEC
SODIUM SERPL-SCNC: 138 MMOL/L

## 2023-09-15 PROBLEM — D49.0 CECAL NEOPLASM: Status: ACTIVE | Noted: 2019-11-06

## 2023-09-18 ENCOUNTER — RESULT REVIEW (OUTPATIENT)
Age: 63
End: 2023-09-18

## 2023-09-18 ENCOUNTER — APPOINTMENT (OUTPATIENT)
Dept: ENDOVASCULAR SURGERY | Facility: CLINIC | Age: 63
End: 2023-09-18
Payer: COMMERCIAL

## 2023-09-18 VITALS
OXYGEN SATURATION: 100 % | WEIGHT: 126 LBS | BODY MASS INDEX: 19.78 KG/M2 | HEART RATE: 65 BPM | TEMPERATURE: 97.9 F | HEIGHT: 67 IN | RESPIRATION RATE: 16 BRPM | SYSTOLIC BLOOD PRESSURE: 138 MMHG | DIASTOLIC BLOOD PRESSURE: 88 MMHG

## 2023-09-18 DIAGNOSIS — D49.0 NEOPLASM OF UNSPECIFIED BEHAVIOR OF DIGESTIVE SYSTEM: ICD-10-CM

## 2023-09-18 PROCEDURE — 77001 FLUOROGUIDE FOR VEIN DEVICE: CPT

## 2023-09-18 PROCEDURE — 36590 REMOVAL TUNNELED CV CATH: CPT | Mod: RT

## 2024-02-26 ENCOUNTER — RESULT REVIEW (OUTPATIENT)
Age: 64
End: 2024-02-26

## 2024-02-26 ENCOUNTER — OUTPATIENT (OUTPATIENT)
Dept: OUTPATIENT SERVICES | Facility: HOSPITAL | Age: 64
LOS: 1 days | Discharge: HOME | End: 2024-02-26

## 2024-02-26 DIAGNOSIS — C18.9 MALIGNANT NEOPLASM OF COLON, UNSPECIFIED: ICD-10-CM

## 2024-02-26 DIAGNOSIS — Z98.890 OTHER SPECIFIED POSTPROCEDURAL STATES: Chronic | ICD-10-CM

## 2024-02-26 DIAGNOSIS — Z98.891 HISTORY OF UTERINE SCAR FROM PREVIOUS SURGERY: Chronic | ICD-10-CM

## 2024-02-27 ENCOUNTER — OUTPATIENT (OUTPATIENT)
Dept: OUTPATIENT SERVICES | Facility: HOSPITAL | Age: 64
LOS: 1 days | End: 2024-02-27
Payer: COMMERCIAL

## 2024-02-27 ENCOUNTER — APPOINTMENT (OUTPATIENT)
Dept: CT IMAGING | Facility: IMAGING CENTER | Age: 64
End: 2024-02-27
Payer: COMMERCIAL

## 2024-02-27 DIAGNOSIS — Z98.890 OTHER SPECIFIED POSTPROCEDURAL STATES: Chronic | ICD-10-CM

## 2024-02-27 DIAGNOSIS — C18.1 MALIGNANT NEOPLASM OF APPENDIX: ICD-10-CM

## 2024-02-27 DIAGNOSIS — Z98.891 HISTORY OF UTERINE SCAR FROM PREVIOUS SURGERY: Chronic | ICD-10-CM

## 2024-02-27 PROCEDURE — 71260 CT THORAX DX C+: CPT | Mod: 26

## 2024-02-27 PROCEDURE — 71260 CT THORAX DX C+: CPT

## 2024-02-27 PROCEDURE — 74177 CT ABD & PELVIS W/CONTRAST: CPT

## 2024-02-27 PROCEDURE — 74177 CT ABD & PELVIS W/CONTRAST: CPT | Mod: 26

## 2024-03-04 ENCOUNTER — RESULT REVIEW (OUTPATIENT)
Age: 64
End: 2024-03-04

## 2024-03-04 ENCOUNTER — APPOINTMENT (OUTPATIENT)
Dept: HEMATOLOGY ONCOLOGY | Facility: CLINIC | Age: 64
End: 2024-03-04
Payer: COMMERCIAL

## 2024-03-04 VITALS
HEART RATE: 75 BPM | OXYGEN SATURATION: 97 % | RESPIRATION RATE: 16 BRPM | WEIGHT: 129.17 LBS | DIASTOLIC BLOOD PRESSURE: 81 MMHG | TEMPERATURE: 97.2 F | SYSTOLIC BLOOD PRESSURE: 135 MMHG | BODY MASS INDEX: 20.23 KG/M2

## 2024-03-04 DIAGNOSIS — C18.1 MALIGNANT NEOPLASM OF APPENDIX: ICD-10-CM

## 2024-03-04 LAB
BASOPHILS # BLD AUTO: 0.04 K/UL — SIGNIFICANT CHANGE UP (ref 0–0.2)
BASOPHILS NFR BLD AUTO: 0.8 % — SIGNIFICANT CHANGE UP (ref 0–2)
EOSINOPHIL # BLD AUTO: 0.1 K/UL — SIGNIFICANT CHANGE UP (ref 0–0.5)
EOSINOPHIL NFR BLD AUTO: 1.9 % — SIGNIFICANT CHANGE UP (ref 0–6)
HCT VFR BLD CALC: 40.1 % — SIGNIFICANT CHANGE UP (ref 34.5–45)
HGB BLD-MCNC: 13.1 G/DL — SIGNIFICANT CHANGE UP (ref 11.5–15.5)
IMM GRANULOCYTES NFR BLD AUTO: 0.2 % — SIGNIFICANT CHANGE UP (ref 0–0.9)
LYMPHOCYTES # BLD AUTO: 1.51 K/UL — SIGNIFICANT CHANGE UP (ref 1–3.3)
LYMPHOCYTES # BLD AUTO: 28.4 % — SIGNIFICANT CHANGE UP (ref 13–44)
MCHC RBC-ENTMCNC: 28.6 PG — SIGNIFICANT CHANGE UP (ref 27–34)
MCHC RBC-ENTMCNC: 32.7 G/DL — SIGNIFICANT CHANGE UP (ref 32–36)
MCV RBC AUTO: 87.6 FL — SIGNIFICANT CHANGE UP (ref 80–100)
MONOCYTES # BLD AUTO: 0.35 K/UL — SIGNIFICANT CHANGE UP (ref 0–0.9)
MONOCYTES NFR BLD AUTO: 6.6 % — SIGNIFICANT CHANGE UP (ref 2–14)
NEUTROPHILS # BLD AUTO: 3.31 K/UL — SIGNIFICANT CHANGE UP (ref 1.8–7.4)
NEUTROPHILS NFR BLD AUTO: 62.1 % — SIGNIFICANT CHANGE UP (ref 43–77)
NRBC # BLD: 0 /100 WBCS — SIGNIFICANT CHANGE UP (ref 0–0)
PLATELET # BLD AUTO: 193 K/UL — SIGNIFICANT CHANGE UP (ref 150–400)
RBC # BLD: 4.58 M/UL — SIGNIFICANT CHANGE UP (ref 3.8–5.2)
RBC # FLD: 12.8 % — SIGNIFICANT CHANGE UP (ref 10.3–14.5)
WBC # BLD: 5.32 K/UL — SIGNIFICANT CHANGE UP (ref 3.8–10.5)
WBC # FLD AUTO: 5.32 K/UL — SIGNIFICANT CHANGE UP (ref 3.8–10.5)

## 2024-03-04 PROCEDURE — G2211 COMPLEX E/M VISIT ADD ON: CPT

## 2024-03-04 PROCEDURE — 99214 OFFICE O/P EST MOD 30 MIN: CPT

## 2024-03-04 NOTE — CONSULT LETTER
[Dear  ___] : Dear  [unfilled], [Courtesy Letter:] : I had the pleasure of seeing your patient, [unfilled], in my office today. [Please see my note below.] : Please see my note below. [Consult Closing:] : Thank you very much for allowing me to participate in the care of this patient.  If you have any questions, please do not hesitate to contact me. [DrVadim  ___] : Dr. ALVARADO [Sincerely,] : Sincerely, [DrVadim ___] : Dr. ALVARADO

## 2024-03-04 NOTE — HISTORY OF PRESENT ILLNESS
[Disease: _____________________] : Disease: [unfilled] [T: ___] : T[unfilled] [N: ___] : N[unfilled] [M: ___] : M[unfilled] [AJCC Stage: ____] : AJCC Stage: [unfilled] [de-identified] : Ms. Rutherford is a 58 year old female with past medical history of Graves disease presenting to the office for an initial consultation of colon CA.  Patient c/o right LQ pain with occasional bloating, denies change in bowel habits for several months. Had a colonoscopy in 2012 that is self-reported as normal. 2017 pelvic sono was negative.   She was seen by GI in late 10/2019.  CAT abdomen with contrast ordered which revealed cecal mass with enlarged LNs.  F/U colonoscopy revealed sessile SC polyp which was biopsied (hyperplastic) and a malignant appearing mass involving IC valve/cecum. Pathology: adenocarcinoma  Patient underwent right hemicolectomy on 11/11/2019 with Dr. Griffiths. 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 Nineteen of 29 lymph nodes positive for adenocarcinoma (19/29). Margins negative; lymph-vascular invasion present; perineural invasion not identified; tumor budding high score. AJCC pT4a N2b No evidence of DNA mismatch repair deficiency.  12/12/2019: Patient is here for follow up accompanied with friend.  CAT chest, abdomen and pelvis performed on 12/30/2019 demonstrated no evidence of metastatic disease.  Mediport placed on 12/3/2019.  Today her main complaint is cough which started on 12/4/2019.  Cough is productive FOR PAST DAY. We reviewed FOLFOX and side effects and purpose of treatment. Consent process performed and signed by patient.  12/24/19:  Colon CA: Patient here for toxicity evaluation s/p FOLFOX C1 on 12/16/2019.  She tolerated infusion well and developed no anaphylaxis reactions.  Denies fever, chills, mouth sores, neuropathy, diarrhea or weight loss.  CBC with diff performed on 12/23/2019 was unremarkable. Broken Tooth: Patient noticed over the weekend her R molar broke off.  She was seen by her dentist who performed a filling however states patient will require a root canal.   Left gingiva swelling: Noted after her first treatment.  She was seen by her dentist who rx a local anesthetic with excellent relief. Fatigue: Mild fatigue 2-3 days after her first treatment however abates during her off week.  She is able to perform her routine/instrumental ADLs without restrictions.  1/15/2020:  Colon CA: Today is C3D3 FOLFOX. Due to neutropenia during C2, 5-FU IVP was discontinued.  Patient has notice less fatigue afterwards.  She is doing well on treatment and reports no major adverse events.  She has intermittent cold induce neuropathy on days of treatment and 2-3 days afterwards however abates.  She denies fever, chills, mouth sores, diarrhea or mouth sores. Social: Patient in excellent spirits today.  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  2/25/2020: C#6 is due tomorrow. She notices that the neuropathy is totally gone in between treatments. However she must avoid cold entirely. She has been very active in teaching and work. She has been singing regularly and will go to [facility] in a week to sing. She went to [facility] 2 weeks ago for ~ 4 days to visit her ill mother who is suffering from dementia. Overall, she is managing the psychological component of her treatment pretty well; and had relied on strategies from Dr. Moscoso, and support from friends. She feels relatively weak since the surgery and chemotherapy and is asking if she can participate in pilates classes and mild/moderate strengthening exercises, to which I supported.  She does not some left low back/flank pain in the AM and is concerned about her kidney.  3/11/2020: 1) Colon CA: C6 FOLFOX given on 3/4/2020.  Today her main complaint is fatigue. 2) Fatigue: Started on D4 of her last treatment.  Up until that point, she was doing extremely well.  On day 4-6 patient felt increase in fatigue.  She states she was in bed all day sleeping and is now fully recovering.  She is able to perform her routine/instrumental ADLs without any restrictions.  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   Patient has noticed she is pushing her self too much with working full time, taking care of her child and going to gym.  We have decided to cut back on work and see how patient responds after C7 of FOLFOX. 3) Anxiety: She currently works in [place] and is worried about her exposure to COVID-19.  She has decided to take 2-3 weeks off from work.  4/1/2020: 1) Colon CA: Today is C8 FOLFOX. 2) Neuropathy: Patient notices neuropathy located on her bilateral fingertips on days of treatment especially if she's exposed to cold temperatures.   Generally speaking patient wears mittens and notices if she is active the neuropathy tends to improve.  She denies neuropathy on her off week and denies it today.  She is able to functions and use her hands without any restrictions. 3) Social She is currently not working due to COVID-19 pandemic. 4) Left flank pain: Patient is complaining of intermittent lower back/flank pain that generally happens at night time.  She denies fever, chills, dysuria, hematuria or lifting heavy object.  She is concerned its her kidneys however her renal functions are WNL and last scan on 11/2019 demonstrated no abnormality in the kidneys.  5/6/2020: 1) Colon CA: Patient was scheduled for C10 FOLFOX today however due to thrombocytopenia, will delay treatment x one week.  She is feeling well today and denies fever, chills, diarrhea, rash, HFS or fatigue.  She underwent CAT chest, abdomen and pelvis on 5/5/2020 which demonstrated no recurrent or metastatic disease.  2) Neuropathy: Very mild neuropathy noted on her fingertips which is triggered by cold temperature.  Patient has noticed the neuropathy lasting on her off week.  It is intermittent and not too bothersome for patient.  She is able to use her hands without any restrictions.  7/28/2020:  1) Colon CA: Patient is here for evaluation after 12 cycles of chemotherapy. She tolerated treatment well and reports no significant adverse events. She has noted a pain or discomfort in the RLQ when passes stool through that area. Has been trying to increase fiber. It does depend on certain food, eg chocolate, certain breads and cheese.  2) Neuropathy: She is complaining of increasing neuropathy located on her fingertips and toes especially. Will continue to observe for improvement over time. She is using Accupuncture.   2/23/21 Mammogram, breast US reviewed and are negative. Will repeat annually.  Colonoscopy negative as per patient. Repeat in 3 years, and reviewed with patient Labs reviewed Neuropathy - improving in hands and slower in feet. Working for school as . Had both COVID vaccines - the last was on 2/4, just prior to surveillance CT scan and in the same arm as the mildly enlarged node in the axilla on left.  6/9/2021: 1) Colon CA: Patient is here for follow up for stage 3 colon CA.  She underwent CT chest, abdomen/pelvis on 6/3/2021 which demonstrated resolution of previously noted enlarged left axillary lymph nodes. No suspicious findings. We reviewed labs on 5/2021 which showed no acute abnormality.  CEA is WNL. She denies change in appetite, blood in stool, abdominal pain or change in bowel habits. Patient main complaint is bilateral eye twitching. As per patient she had 2 episodes of eye twitching and dizziness over the past 3 weeks. She denies fever, chills, change in mental status, headache, nausea, slurred speech or change in vision. She is resting properly however does admit to mild stress due to pandemic. She is currently on Levothyroxine 75 mcg, will check TSH levels today. 2) Social: Patient has no restrictions with her ADLs.  She is swimming three times/week. She is planning on travelling to [facility] towards the end of June 2021.  11/10/2021: TEB 1) Colon CA: Patient underwent CAT chest, abdomen/pelvis on 10/2021 which revealed no evidence of metastatic disease. Her labs on 9/2021 were unremarkable.   Her last port flush was on 10/2021.  She was advised to make q 6-8 weeks. She is feeling relative well and voices no new complaints.  Denies fatigue, weight loss, blood in stool or abdominal pain. 2) Eye lesion: Patient went for routine checkup with her ophthalmologist (Dr. Jessenia Mcneil) and was found to have a lesion on her left eye. She has an appointment on Wednesday (11/17/2021) with an ophthalmologist oncologist, Dr. Annetta Vaz ([facility]) 3) Right ear tinnitus: She is being seen by ENT and was recommended to undergo MRI head however was unable to undergo. 4) Social: Patient received Pfizer booster vaccine. She is working full time.  4/28/22 Patient doing well.  Last colonoscopy with Dr Haynes was negative 10/2020 and is due 2023 again. CT scan 3/2022 negative US ovary - chronic cyst. Copies provided to review with Gyn.  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 Has trace pelvic fluid on new CT scan. Please assess for relapse.  Had seen Dr Haynes who performed colonoscopy 11/2022 that was completely negative. Also, saw GYN last year and had a negative exam. Will obtain new US. Patient plans to travel to [place] in 8/2023. Patient started Restasis for dry eyes.  03/04/2024 She underwent CT on 02/27/2024 - NANCY. Will repeat 09/2024. She is travelling to [place] in August 2024. No complaints. Underwent follow up with OBGYN with US. No acute intervention is required. Labs today.  [de-identified] : adenocarcinoma [de-identified] : CEA - no preop level. [de-identified] : Surgeon: Yuval Haynes; [phone]\par  PCP: Kimberly Melton; [phone]\par  Gynecology: Rita An; [phone]\par  \par  Patient Cell # [phone]\par  \par

## 2024-03-05 LAB
ALBUMIN SERPL ELPH-MCNC: 4.5 G/DL
ALP BLD-CCNC: 114 U/L
ALT SERPL-CCNC: 22 U/L
ANION GAP SERPL CALC-SCNC: 12 MMOL/L
AST SERPL-CCNC: 32 U/L
BILIRUB SERPL-MCNC: 0.3 MG/DL
BUN SERPL-MCNC: 15 MG/DL
CALCIUM SERPL-MCNC: 9.1 MG/DL
CEA SERPL-MCNC: 1.2 NG/ML
CHLORIDE SERPL-SCNC: 98 MMOL/L
CO2 SERPL-SCNC: 26 MMOL/L
CREAT SERPL-MCNC: 0.86 MG/DL
EGFR: 76 ML/MIN/1.73M2
GLUCOSE SERPL-MCNC: 91 MG/DL
POTASSIUM SERPL-SCNC: 4.4 MMOL/L
PROT SERPL-MCNC: 7 G/DL
SODIUM SERPL-SCNC: 136 MMOL/L

## 2024-03-12 ENCOUNTER — APPOINTMENT (OUTPATIENT)
Dept: MAMMOGRAPHY | Facility: IMAGING CENTER | Age: 64
End: 2024-03-12
Payer: COMMERCIAL

## 2024-03-12 ENCOUNTER — OUTPATIENT (OUTPATIENT)
Dept: OUTPATIENT SERVICES | Facility: HOSPITAL | Age: 64
LOS: 1 days | End: 2024-03-12
Payer: COMMERCIAL

## 2024-03-12 ENCOUNTER — APPOINTMENT (OUTPATIENT)
Dept: ULTRASOUND IMAGING | Facility: IMAGING CENTER | Age: 64
End: 2024-03-12
Payer: COMMERCIAL

## 2024-03-12 DIAGNOSIS — Z98.890 OTHER SPECIFIED POSTPROCEDURAL STATES: Chronic | ICD-10-CM

## 2024-03-12 DIAGNOSIS — Z98.891 HISTORY OF UTERINE SCAR FROM PREVIOUS SURGERY: Chronic | ICD-10-CM

## 2024-03-12 DIAGNOSIS — Z00.8 ENCOUNTER FOR OTHER GENERAL EXAMINATION: ICD-10-CM

## 2024-03-12 PROCEDURE — 76641 ULTRASOUND BREAST COMPLETE: CPT

## 2024-03-12 PROCEDURE — 77063 BREAST TOMOSYNTHESIS BI: CPT | Mod: 26

## 2024-03-12 PROCEDURE — 77063 BREAST TOMOSYNTHESIS BI: CPT

## 2024-03-12 PROCEDURE — 76641 ULTRASOUND BREAST COMPLETE: CPT | Mod: 26,50

## 2024-03-12 PROCEDURE — 77067 SCR MAMMO BI INCL CAD: CPT

## 2024-03-12 PROCEDURE — 77067 SCR MAMMO BI INCL CAD: CPT | Mod: 26

## 2024-03-18 ENCOUNTER — APPOINTMENT (OUTPATIENT)
Dept: ULTRASOUND IMAGING | Facility: IMAGING CENTER | Age: 64
End: 2024-03-18
Payer: COMMERCIAL

## 2024-03-18 ENCOUNTER — OUTPATIENT (OUTPATIENT)
Dept: OUTPATIENT SERVICES | Facility: HOSPITAL | Age: 64
LOS: 1 days | End: 2024-03-18
Payer: COMMERCIAL

## 2024-03-18 DIAGNOSIS — Z98.891 HISTORY OF UTERINE SCAR FROM PREVIOUS SURGERY: Chronic | ICD-10-CM

## 2024-03-18 DIAGNOSIS — Z98.890 OTHER SPECIFIED POSTPROCEDURAL STATES: Chronic | ICD-10-CM

## 2024-03-18 DIAGNOSIS — Z00.8 ENCOUNTER FOR OTHER GENERAL EXAMINATION: ICD-10-CM

## 2024-03-18 PROCEDURE — 76642 ULTRASOUND BREAST LIMITED: CPT | Mod: 26,RT

## 2024-03-18 PROCEDURE — 76642 ULTRASOUND BREAST LIMITED: CPT

## 2024-03-22 ENCOUNTER — APPOINTMENT (OUTPATIENT)
Dept: OPHTHALMOLOGY | Facility: CLINIC | Age: 64
End: 2024-03-22
Payer: COMMERCIAL

## 2024-03-22 ENCOUNTER — OUTPATIENT (OUTPATIENT)
Dept: OUTPATIENT SERVICES | Facility: HOSPITAL | Age: 64
LOS: 1 days | End: 2024-03-22
Payer: COMMERCIAL

## 2024-03-22 ENCOUNTER — NON-APPOINTMENT (OUTPATIENT)
Age: 64
End: 2024-03-22

## 2024-03-22 ENCOUNTER — APPOINTMENT (OUTPATIENT)
Dept: ULTRASOUND IMAGING | Facility: IMAGING CENTER | Age: 64
End: 2024-03-22
Payer: COMMERCIAL

## 2024-03-22 DIAGNOSIS — Z98.890 OTHER SPECIFIED POSTPROCEDURAL STATES: Chronic | ICD-10-CM

## 2024-03-22 DIAGNOSIS — Z98.891 HISTORY OF UTERINE SCAR FROM PREVIOUS SURGERY: Chronic | ICD-10-CM

## 2024-03-22 DIAGNOSIS — Z00.8 ENCOUNTER FOR OTHER GENERAL EXAMINATION: ICD-10-CM

## 2024-03-22 PROCEDURE — 92014 COMPRE OPH EXAM EST PT 1/>: CPT

## 2024-03-22 PROCEDURE — 19083 BX BREAST 1ST LESION US IMAG: CPT | Mod: RT

## 2024-03-22 PROCEDURE — 88305 TISSUE EXAM BY PATHOLOGIST: CPT | Mod: 26

## 2024-03-22 PROCEDURE — 92250 FUNDUS PHOTOGRAPHY W/I&R: CPT

## 2024-03-22 PROCEDURE — 88305 TISSUE EXAM BY PATHOLOGIST: CPT

## 2024-03-22 PROCEDURE — A4648: CPT

## 2024-03-22 PROCEDURE — 19083 BX BREAST 1ST LESION US IMAG: CPT

## 2024-03-26 LAB — SURGICAL PATHOLOGY STUDY: SIGNIFICANT CHANGE UP

## 2024-04-02 ENCOUNTER — APPOINTMENT (OUTPATIENT)
Dept: OPHTHALMOLOGY | Facility: CLINIC | Age: 64
End: 2024-04-02
Payer: COMMERCIAL

## 2024-04-02 ENCOUNTER — NON-APPOINTMENT (OUTPATIENT)
Age: 64
End: 2024-04-02

## 2024-04-02 PROCEDURE — 92134 CPTRZ OPH DX IMG PST SGM RTA: CPT

## 2024-04-02 PROCEDURE — 92012 INTRM OPH EXAM EST PATIENT: CPT

## 2024-05-01 ENCOUNTER — NON-APPOINTMENT (OUTPATIENT)
Age: 64
End: 2024-05-01

## 2024-05-01 ENCOUNTER — APPOINTMENT (OUTPATIENT)
Dept: OPHTHALMOLOGY | Facility: CLINIC | Age: 64
End: 2024-05-01
Payer: COMMERCIAL

## 2024-05-01 PROCEDURE — 92012 INTRM OPH EXAM EST PATIENT: CPT

## 2024-05-01 PROCEDURE — 92250 FUNDUS PHOTOGRAPHY W/I&R: CPT

## 2024-05-03 ENCOUNTER — APPOINTMENT (OUTPATIENT)
Dept: RADIOLOGY | Facility: IMAGING CENTER | Age: 64
End: 2024-05-03

## 2024-06-11 ENCOUNTER — APPOINTMENT (OUTPATIENT)
Dept: OPHTHALMOLOGY | Facility: CLINIC | Age: 64
End: 2024-06-11
Payer: COMMERCIAL

## 2024-06-11 ENCOUNTER — NON-APPOINTMENT (OUTPATIENT)
Age: 64
End: 2024-06-11

## 2024-06-11 PROCEDURE — 92012 INTRM OPH EXAM EST PATIENT: CPT

## 2024-06-11 PROCEDURE — 92201 OPSCPY EXTND RTA DRAW UNI/BI: CPT

## 2024-06-21 ENCOUNTER — APPOINTMENT (OUTPATIENT)
Dept: OPHTHALMOLOGY | Facility: CLINIC | Age: 64
End: 2024-06-21

## 2024-07-15 NOTE — PRE-OP CHECKLIST - BP NONINVASIVE DIASTOLIC (MM HG)
Department of Plastic and Reconstructive Surgery            Follow-up      Date: 7/15/2024     Subjective   Macy Chavez is a 42 y.o. female who  presents with  right breast multicentric infiltrating ductal carcinoma, spanning approximately 13 cm    She presents today for FUV after initial consultation.    She has been followed by cardiology for cardiotoxicity associated with drug therapy    IN BRIEF:  Patient had a baseline mammogram on 11/3/2023 which was negative.  She did self palpated a mass shortly thereafter, she had imaging repeated on 12/20/2023 shows benign, patient subjectively noted interval growth and repeat imaging was performed on 2/6/2024 revealing a mass 7:00, 5 cm from nipple measuring 5 cm.    There was a second area palpated at 10:00, 8 cm from nipple concerning for satellite lesion measuring 1.4 cm.  There were 3 markedly noted lymph nodes on examination.  Biopsies were performed on that same day on 2/6/2024: All positive for invasive ductal carcinoma, including the right axillary lymph node all positive for invasive ductal carcinoma      I reviewed with the patient the remainder of the his personal, medical, surgical and social history, found not to be pertinent to chief complaint. I specifically reviewed the family history with patient, found not to be pertinent to chief complaint.     Objective    NO EXAM. VIRTUAL VISIT.   From previous, clinical photography reviewed:    Focused exam of the: Bilateral breasts                                R                  L  SN:NAC             24cm            23cm  NAC:IMF            11cm           11cm         BW                    15cm            15cm        NAC diam         5.5cm             6cm               Ptosis Grade     0               0                                                       Bra Size         34DD          Assessment/Plan       Macy is a 42 y.o. female who presents for  right breast multicentric infiltrating ductal  carcinoma, spanning approximately 13 cm with a conglomerate at least 3 markedly enlarged positive lymph nodes    Patient would like to be a smaller size, ideally a C cup    Her final chemotherapy treatment is scheduled July 16th    Plan:     Bilateral nipple sparing mastectomy  Immediate Direct to Implant with structured saline implants  Bilateral nipple reinnervation with nerve allograft     I spent 30 minutes with this patient. Greater than 50% of this time was spent in the counselling and/or coordination of care of this patient.  This note was created using voice recognition software and was not corrected for typographical or grammatical errors.    Signature: Mundo Mendoza MD   Date: 7/15/2024      91

## 2024-07-27 NOTE — DISCHARGE NOTE PROVIDER - CARE PROVIDERS DIRECT ADDRESSES
[Binocular Microscopic Exam] : Binocular microscopic exam was performed [de-identified] : wax it attic retraction removed with difficulty - post removal DAMIÁN, no cholesteatoma  [Normal] : mucosa is normal [Midline] : trachea located in midline position ,[email or web address]

## 2024-08-21 ENCOUNTER — RESULT REVIEW (OUTPATIENT)
Age: 64
End: 2024-08-21

## 2024-08-22 ENCOUNTER — OUTPATIENT (OUTPATIENT)
Dept: OUTPATIENT SERVICES | Facility: HOSPITAL | Age: 64
LOS: 1 days | End: 2024-08-22
Payer: COMMERCIAL

## 2024-08-22 ENCOUNTER — APPOINTMENT (OUTPATIENT)
Dept: CT IMAGING | Facility: IMAGING CENTER | Age: 64
End: 2024-08-22
Payer: COMMERCIAL

## 2024-08-22 DIAGNOSIS — C18.1 MALIGNANT NEOPLASM OF APPENDIX: ICD-10-CM

## 2024-08-22 DIAGNOSIS — Z98.890 OTHER SPECIFIED POSTPROCEDURAL STATES: Chronic | ICD-10-CM

## 2024-08-22 PROCEDURE — 74177 CT ABD & PELVIS W/CONTRAST: CPT

## 2024-08-22 PROCEDURE — 71260 CT THORAX DX C+: CPT | Mod: 26

## 2024-08-22 PROCEDURE — 71260 CT THORAX DX C+: CPT

## 2024-08-22 PROCEDURE — 74177 CT ABD & PELVIS W/CONTRAST: CPT | Mod: 26

## 2024-09-24 ENCOUNTER — OUTPATIENT (OUTPATIENT)
Dept: OUTPATIENT SERVICES | Facility: HOSPITAL | Age: 64
LOS: 1 days | Discharge: HOME | End: 2024-09-24

## 2024-09-24 DIAGNOSIS — Z98.891 HISTORY OF UTERINE SCAR FROM PREVIOUS SURGERY: Chronic | ICD-10-CM

## 2024-09-24 DIAGNOSIS — Z98.890 OTHER SPECIFIED POSTPROCEDURAL STATES: Chronic | ICD-10-CM

## 2024-09-24 DIAGNOSIS — C18.9 MALIGNANT NEOPLASM OF COLON, UNSPECIFIED: ICD-10-CM

## 2024-11-20 ENCOUNTER — NON-APPOINTMENT (OUTPATIENT)
Age: 64
End: 2024-11-20

## 2024-11-20 ENCOUNTER — APPOINTMENT (OUTPATIENT)
Dept: HEMATOLOGY ONCOLOGY | Facility: CLINIC | Age: 64
End: 2024-11-20
Payer: COMMERCIAL

## 2024-11-20 VITALS
DIASTOLIC BLOOD PRESSURE: 81 MMHG | WEIGHT: 127.87 LBS | HEART RATE: 73 BPM | OXYGEN SATURATION: 99 % | SYSTOLIC BLOOD PRESSURE: 128 MMHG | RESPIRATION RATE: 16 BRPM | BODY MASS INDEX: 20.03 KG/M2

## 2024-11-20 DIAGNOSIS — C18.1 MALIGNANT NEOPLASM OF APPENDIX: ICD-10-CM

## 2024-11-20 PROCEDURE — 99214 OFFICE O/P EST MOD 30 MIN: CPT

## 2024-11-20 PROCEDURE — G2211 COMPLEX E/M VISIT ADD ON: CPT | Mod: NC

## 2024-12-25 PROBLEM — F10.90 ALCOHOL USE: Status: ACTIVE | Noted: 2019-11-06

## 2025-02-14 ENCOUNTER — APPOINTMENT (OUTPATIENT)
Dept: CT IMAGING | Facility: IMAGING CENTER | Age: 65
End: 2025-02-14
Payer: COMMERCIAL

## 2025-02-14 ENCOUNTER — OUTPATIENT (OUTPATIENT)
Dept: OUTPATIENT SERVICES | Facility: HOSPITAL | Age: 65
LOS: 1 days | End: 2025-02-14
Payer: COMMERCIAL

## 2025-02-14 ENCOUNTER — RESULT REVIEW (OUTPATIENT)
Age: 65
End: 2025-02-14

## 2025-02-14 DIAGNOSIS — Z98.890 OTHER SPECIFIED POSTPROCEDURAL STATES: Chronic | ICD-10-CM

## 2025-02-14 DIAGNOSIS — Z98.891 HISTORY OF UTERINE SCAR FROM PREVIOUS SURGERY: Chronic | ICD-10-CM

## 2025-02-14 DIAGNOSIS — C18.1 MALIGNANT NEOPLASM OF APPENDIX: ICD-10-CM

## 2025-02-14 PROCEDURE — 74177 CT ABD & PELVIS W/CONTRAST: CPT

## 2025-02-14 PROCEDURE — 71260 CT THORAX DX C+: CPT

## 2025-02-14 PROCEDURE — 71260 CT THORAX DX C+: CPT | Mod: 26

## 2025-02-14 PROCEDURE — 74177 CT ABD & PELVIS W/CONTRAST: CPT | Mod: 26

## 2025-09-19 ENCOUNTER — APPOINTMENT (OUTPATIENT)
Dept: CT IMAGING | Facility: IMAGING CENTER | Age: 65
End: 2025-09-19
Payer: COMMERCIAL

## 2025-09-19 ENCOUNTER — APPOINTMENT (OUTPATIENT)
Dept: ULTRASOUND IMAGING | Facility: IMAGING CENTER | Age: 65
End: 2025-09-19
Payer: COMMERCIAL

## 2025-09-19 ENCOUNTER — APPOINTMENT (OUTPATIENT)
Dept: MAMMOGRAPHY | Facility: IMAGING CENTER | Age: 65
End: 2025-09-19
Payer: COMMERCIAL

## 2025-09-19 PROCEDURE — 76641 ULTRASOUND BREAST COMPLETE: CPT | Mod: 26,50

## 2025-09-19 PROCEDURE — 77067 SCR MAMMO BI INCL CAD: CPT | Mod: 26

## 2025-09-19 PROCEDURE — 77063 BREAST TOMOSYNTHESIS BI: CPT | Mod: 26

## 2025-09-19 PROCEDURE — 71260 CT THORAX DX C+: CPT | Mod: 26

## 2025-09-19 PROCEDURE — 74177 CT ABD & PELVIS W/CONTRAST: CPT | Mod: 26
